# Patient Record
Sex: MALE | NOT HISPANIC OR LATINO | Employment: OTHER | URBAN - METROPOLITAN AREA
[De-identification: names, ages, dates, MRNs, and addresses within clinical notes are randomized per-mention and may not be internally consistent; named-entity substitution may affect disease eponyms.]

---

## 2017-01-09 ENCOUNTER — TELEPHONE (OUTPATIENT)
Dept: ONCOLOGY | Facility: HOSPITAL | Age: 27
End: 2017-01-09

## 2017-01-09 NOTE — TELEPHONE ENCOUNTER
----- Message from Rema Tammy sent at 1/9/2017  4:18 PM EST -----  Contact: [phone]   Pt calling has MRSA in hi leg should he continue his Spyrcel with that going on?      Patient is being treated with vancomycin for an MRSA infection in his leg.  His doc would like for him to stop the sprycel for a period of time to help with the healing process.  Reviewed with Isaura Hough, ok for patient to stop it.  Patient will know more tomorrow about how long he needs to be off and will update Dr. Riddle tomorrow.

## 2017-04-19 ENCOUNTER — TRANSCRIBE ORDERS (OUTPATIENT)
Dept: ADMINISTRATIVE | Facility: HOSPITAL | Age: 27
End: 2017-04-19

## 2017-04-19 ENCOUNTER — HOSPITAL ENCOUNTER (OUTPATIENT)
Dept: GENERAL RADIOLOGY | Facility: HOSPITAL | Age: 27
Discharge: HOME | End: 2017-04-19
Admitting: NURSE PRACTITIONER

## 2017-04-19 DIAGNOSIS — M47.26 OTHER SPONDYLOSIS WITH RADICULOPATHY, LUMBAR REGION: ICD-10-CM

## 2017-04-19 DIAGNOSIS — M47.26 OTHER SPONDYLOSIS WITH RADICULOPATHY, LUMBAR REGION: Primary | ICD-10-CM

## 2017-04-19 PROCEDURE — 72114 X-RAY EXAM L-S SPINE BENDING: CPT

## 2017-05-01 ENCOUNTER — OFFICE VISIT (OUTPATIENT)
Dept: RETAIL CLINIC | Facility: CLINIC | Age: 27
End: 2017-05-01

## 2017-05-01 DIAGNOSIS — Z02.83 ENCOUNTER FOR DRUG SCREENING: Primary | ICD-10-CM

## 2017-05-03 ENCOUNTER — DOCUMENTATION (OUTPATIENT)
Dept: ONCOLOGY | Facility: CLINIC | Age: 27
End: 2017-05-03

## 2017-05-23 ENCOUNTER — LAB (OUTPATIENT)
Dept: LAB | Facility: HOSPITAL | Age: 27
End: 2017-05-23

## 2017-05-23 DIAGNOSIS — C92.11 CHRONIC MYELOID LEUKEMIA, BCR/ABL-POSITIVE, IN REMISSION (HCC): ICD-10-CM

## 2017-05-23 LAB
ALBUMIN SERPL-MCNC: 4 G/DL (ref 3.5–5.2)
ALBUMIN/GLOB SERPL: 1.7 G/DL
ALP SERPL-CCNC: 62 U/L (ref 40–129)
ALT SERPL W P-5'-P-CCNC: 19 U/L (ref 5–41)
ANION GAP SERPL CALCULATED.3IONS-SCNC: 13.4 MMOL/L
AST SERPL-CCNC: 15 U/L (ref 5–40)
BASOPHILS # BLD AUTO: 0.05 10*3/MM3 (ref 0–0.2)
BASOPHILS NFR BLD AUTO: 0.4 % (ref 0–2)
BILIRUB SERPL-MCNC: 0.2 MG/DL (ref 0.2–1.2)
BUN BLD-MCNC: 13 MG/DL (ref 6–20)
BUN/CREAT SERPL: 16.9 (ref 7–25)
CALCIUM SPEC-SCNC: 8.4 MG/DL (ref 8.6–10.5)
CHLORIDE SERPL-SCNC: 105 MMOL/L (ref 98–107)
CO2 SERPL-SCNC: 21.6 MMOL/L (ref 22–29)
CREAT BLD-MCNC: 0.77 MG/DL (ref 0.76–1.27)
DEPRECATED RDW RBC AUTO: 45.6 FL (ref 37–54)
EOSINOPHIL # BLD AUTO: 0.3 10*3/MM3 (ref 0.1–0.3)
EOSINOPHIL NFR BLD AUTO: 2.5 % (ref 0–4)
ERYTHROCYTE [DISTWIDTH] IN BLOOD BY AUTOMATED COUNT: 12.8 % (ref 11.5–14.5)
GFR SERPL CREATININE-BSD FRML MDRD: 120 ML/MIN/1.73
GLOBULIN UR ELPH-MCNC: 2.3 GM/DL
GLUCOSE BLD-MCNC: 122 MG/DL (ref 65–99)
HCT VFR BLD AUTO: 40.9 % (ref 42–52)
HGB BLD-MCNC: 13.5 G/DL (ref 14–18)
IMM GRANULOCYTES # BLD: 0.03 10*3/MM3 (ref 0–0.03)
IMM GRANULOCYTES NFR BLD: 0.2 % (ref 0–0.5)
LYMPHOCYTES # BLD AUTO: 3.27 10*3/MM3 (ref 0.6–4.8)
LYMPHOCYTES NFR BLD AUTO: 26.9 % (ref 20–45)
MCH RBC QN AUTO: 31.8 PG (ref 27–31)
MCHC RBC AUTO-ENTMCNC: 33 G/DL (ref 31–37)
MCV RBC AUTO: 96.2 FL (ref 80–94)
MONOCYTES # BLD AUTO: 1.17 10*3/MM3 (ref 0–1)
MONOCYTES NFR BLD AUTO: 9.6 % (ref 3–8)
NEUTROPHILS # BLD AUTO: 7.34 10*3/MM3 (ref 1.5–8.3)
NEUTROPHILS NFR BLD AUTO: 60.4 % (ref 45–70)
NRBC BLD MANUAL-RTO: 0 /100 WBC (ref 0–0)
PLATELET # BLD AUTO: 184 10*3/MM3 (ref 140–500)
PMV BLD AUTO: 11.1 FL (ref 7.4–10.4)
POTASSIUM BLD-SCNC: 3.8 MMOL/L (ref 3.5–5.2)
PROT SERPL-MCNC: 6.3 G/DL (ref 6–8.5)
RBC # BLD AUTO: 4.25 10*6/MM3 (ref 4.7–6.1)
SODIUM BLD-SCNC: 140 MMOL/L (ref 136–145)
TSH SERPL DL<=0.05 MIU/L-ACNC: 1.51 MIU/ML (ref 0.27–4.2)
WBC NRBC COR # BLD: 12.16 10*3/MM3 (ref 4.8–10.8)

## 2017-05-23 PROCEDURE — 81206 BCR/ABL1 GENE MAJOR BP: CPT | Performed by: INTERNAL MEDICINE

## 2017-05-23 PROCEDURE — 80053 COMPREHEN METABOLIC PANEL: CPT | Performed by: INTERNAL MEDICINE

## 2017-05-23 PROCEDURE — 36415 COLL VENOUS BLD VENIPUNCTURE: CPT | Performed by: INTERNAL MEDICINE

## 2017-05-23 PROCEDURE — 81207 BCR/ABL1 GENE MINOR BP: CPT | Performed by: INTERNAL MEDICINE

## 2017-05-23 PROCEDURE — 85025 COMPLETE CBC W/AUTO DIFF WBC: CPT | Performed by: INTERNAL MEDICINE

## 2017-05-23 PROCEDURE — 84443 ASSAY THYROID STIM HORMONE: CPT | Performed by: INTERNAL MEDICINE

## 2017-05-31 LAB — REF LAB TEST METHOD: NORMAL

## 2017-06-13 ENCOUNTER — APPOINTMENT (OUTPATIENT)
Dept: ONCOLOGY | Facility: CLINIC | Age: 27
End: 2017-06-13

## 2017-06-13 ENCOUNTER — APPOINTMENT (OUTPATIENT)
Dept: LAB | Facility: HOSPITAL | Age: 27
End: 2017-06-13

## 2017-07-24 ENCOUNTER — OFFICE VISIT (OUTPATIENT)
Dept: RETAIL CLINIC | Facility: CLINIC | Age: 27
End: 2017-07-24

## 2017-07-24 DIAGNOSIS — Z02.83 ENCOUNTER FOR DRUG SCREENING: Primary | ICD-10-CM

## 2017-07-25 ENCOUNTER — APPOINTMENT (OUTPATIENT)
Dept: LAB | Facility: HOSPITAL | Age: 27
End: 2017-07-25

## 2017-07-25 ENCOUNTER — OFFICE VISIT (OUTPATIENT)
Dept: ONCOLOGY | Facility: CLINIC | Age: 27
End: 2017-07-25

## 2017-07-25 VITALS
HEART RATE: 76 BPM | RESPIRATION RATE: 16 BRPM | HEIGHT: 78 IN | WEIGHT: 315 LBS | TEMPERATURE: 97.7 F | OXYGEN SATURATION: 97 % | DIASTOLIC BLOOD PRESSURE: 78 MMHG | SYSTOLIC BLOOD PRESSURE: 149 MMHG | BODY MASS INDEX: 36.45 KG/M2

## 2017-07-25 DIAGNOSIS — C92.11 CHRONIC MYELOID LEUKEMIA, BCR/ABL-POSITIVE, IN REMISSION (HCC): Primary | ICD-10-CM

## 2017-07-25 LAB
BASOPHILS # BLD AUTO: 0.05 10*3/MM3 (ref 0–0.2)
BASOPHILS NFR BLD AUTO: 0.4 % (ref 0–2)
DEPRECATED RDW RBC AUTO: 45.1 FL (ref 37–54)
EOSINOPHIL # BLD AUTO: 0.32 10*3/MM3 (ref 0.1–0.3)
EOSINOPHIL NFR BLD AUTO: 2.7 % (ref 0–4)
ERYTHROCYTE [DISTWIDTH] IN BLOOD BY AUTOMATED COUNT: 12.8 % (ref 11.5–14.5)
HCT VFR BLD AUTO: 43.2 % (ref 42–52)
HGB BLD-MCNC: 14.7 G/DL (ref 14–18)
IMM GRANULOCYTES # BLD: 0.05 10*3/MM3 (ref 0–0.03)
IMM GRANULOCYTES NFR BLD: 0.4 % (ref 0–0.5)
LYMPHOCYTES # BLD AUTO: 3.6 10*3/MM3 (ref 0.6–4.8)
LYMPHOCYTES NFR BLD AUTO: 30.6 % (ref 20–45)
MCH RBC QN AUTO: 32.2 PG (ref 27–31)
MCHC RBC AUTO-ENTMCNC: 34 G/DL (ref 31–37)
MCV RBC AUTO: 94.7 FL (ref 80–94)
MONOCYTES # BLD AUTO: 1.1 10*3/MM3 (ref 0–1)
MONOCYTES NFR BLD AUTO: 9.3 % (ref 3–8)
NEUTROPHILS # BLD AUTO: 6.65 10*3/MM3 (ref 1.5–8.3)
NEUTROPHILS NFR BLD AUTO: 56.6 % (ref 45–70)
NRBC BLD MANUAL-RTO: 0.3 /100 WBC (ref 0–0)
PLATELET # BLD AUTO: 166 10*3/MM3 (ref 140–500)
PMV BLD AUTO: 11.6 FL (ref 7.4–10.4)
RBC # BLD AUTO: 4.56 10*6/MM3 (ref 4.7–6.1)
WBC NRBC COR # BLD: 11.77 10*3/MM3 (ref 4.8–10.8)

## 2017-07-25 PROCEDURE — 36415 COLL VENOUS BLD VENIPUNCTURE: CPT | Performed by: INTERNAL MEDICINE

## 2017-07-25 PROCEDURE — 85025 COMPLETE CBC W/AUTO DIFF WBC: CPT | Performed by: INTERNAL MEDICINE

## 2017-07-25 PROCEDURE — 99213 OFFICE O/P EST LOW 20 MIN: CPT | Performed by: INTERNAL MEDICINE

## 2017-07-25 RX ORDER — HYDROCODONE BITARTRATE AND ACETAMINOPHEN 5; 325 MG/1; MG/1
1 TABLET ORAL EVERY 6 HOURS PRN
COMMUNITY
End: 2019-01-31 | Stop reason: HOSPADM
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
## 2017-08-07 ENCOUNTER — DOCUMENTATION (OUTPATIENT)
Dept: ONCOLOGY | Facility: CLINIC | Age: 27
End: 2017-08-07

## 2017-08-07 NOTE — PROGRESS NOTES
Fax rec from [facility] requesting a new rx for pts Sprycel 100 mg. Per 7/25/17 office note from Dr Riddle-Pt will continue. New Rx escribed to [facility].

## 2017-08-26 PROCEDURE — 99284 EMERGENCY DEPT VISIT MOD MDM: CPT

## 2017-08-27 ENCOUNTER — HOSPITAL ENCOUNTER (EMERGENCY)
Facility: HOSPITAL | Age: 27
Discharge: HOME | End: 2017-08-27
Attending: EMERGENCY MEDICINE | Admitting: EMERGENCY MEDICINE

## 2017-08-27 VITALS
BODY MASS INDEX: 36.45 KG/M2 | RESPIRATION RATE: 16 BRPM | SYSTOLIC BLOOD PRESSURE: 153 MMHG | OXYGEN SATURATION: 98 % | TEMPERATURE: 99 F | WEIGHT: 315 LBS | DIASTOLIC BLOOD PRESSURE: 84 MMHG | HEIGHT: 78 IN | HEART RATE: 80 BPM

## 2017-08-27 DIAGNOSIS — R53.83 MALAISE AND FATIGUE: ICD-10-CM

## 2017-08-27 DIAGNOSIS — IMO0001 ELEVATED BLOOD PRESSURE: ICD-10-CM

## 2017-08-27 DIAGNOSIS — R42 VERTIGO: Primary | ICD-10-CM

## 2017-08-27 DIAGNOSIS — R53.81 MALAISE AND FATIGUE: ICD-10-CM

## 2017-08-27 LAB
ANION GAP SERPL CALCULATED.3IONS-SCNC: 12.8 MMOL/L
BACTERIA UR QL AUTO: ABNORMAL /HPF
BASOPHILS # BLD AUTO: 0.08 10*3/MM3 (ref 0–0.2)
BASOPHILS NFR BLD AUTO: 0.5 % (ref 0–2)
BILIRUB UR QL STRIP: NEGATIVE
BUN BLD-MCNC: 19 MG/DL (ref 6–20)
BUN/CREAT SERPL: 18.8 (ref 7–25)
CALCIUM SPEC-SCNC: 8.9 MG/DL (ref 8.6–10.5)
CHLORIDE SERPL-SCNC: 105 MMOL/L (ref 98–107)
CLARITY UR: CLEAR
CO2 SERPL-SCNC: 23.2 MMOL/L (ref 22–29)
COLOR UR: YELLOW
CREAT BLD-MCNC: 1.01 MG/DL (ref 0.76–1.27)
DEPRECATED RDW RBC AUTO: 47.2 FL (ref 37–54)
EOSINOPHIL # BLD AUTO: 0.61 10*3/MM3 (ref 0.1–0.3)
EOSINOPHIL NFR BLD AUTO: 4.1 % (ref 0–4)
ERYTHROCYTE [DISTWIDTH] IN BLOOD BY AUTOMATED COUNT: 13 % (ref 11.5–14.5)
GFR SERPL CREATININE-BSD FRML MDRD: 87 ML/MIN/1.73
GLUCOSE BLD-MCNC: 100 MG/DL (ref 65–99)
GLUCOSE UR STRIP-MCNC: NEGATIVE MG/DL
HCT VFR BLD AUTO: 42.3 % (ref 42–52)
HETEROPH AB SER QL LA: NEGATIVE
HGB BLD-MCNC: 13.9 G/DL (ref 14–18)
HGB UR QL STRIP.AUTO: ABNORMAL
HYALINE CASTS UR QL AUTO: ABNORMAL /LPF
IMM GRANULOCYTES # BLD: 0.04 10*3/MM3 (ref 0–0.03)
IMM GRANULOCYTES NFR BLD: 0.3 % (ref 0–0.5)
KETONES UR QL STRIP: ABNORMAL
LEUKOCYTE ESTERASE UR QL STRIP.AUTO: NEGATIVE
LYMPHOCYTES # BLD AUTO: 4.44 10*3/MM3 (ref 0.6–4.8)
LYMPHOCYTES NFR BLD AUTO: 30.1 % (ref 20–45)
MCH RBC QN AUTO: 32.4 PG (ref 27–31)
MCHC RBC AUTO-ENTMCNC: 32.9 G/DL (ref 31–37)
MCV RBC AUTO: 98.6 FL (ref 80–94)
MONOCYTES # BLD AUTO: 1.78 10*3/MM3 (ref 0–1)
MONOCYTES NFR BLD AUTO: 12.1 % (ref 3–8)
MUCOUS THREADS URNS QL MICRO: ABNORMAL /HPF
NEUTROPHILS # BLD AUTO: 7.79 10*3/MM3 (ref 1.5–8.3)
NEUTROPHILS NFR BLD AUTO: 52.9 % (ref 45–70)
NITRITE UR QL STRIP: NEGATIVE
NRBC BLD MANUAL-RTO: 0 /100 WBC (ref 0–0)
PH UR STRIP.AUTO: 6 [PH] (ref 4.5–8)
PLATELET # BLD AUTO: 158 10*3/MM3 (ref 140–500)
PMV BLD AUTO: 11.9 FL (ref 7.4–10.4)
POTASSIUM BLD-SCNC: 3.8 MMOL/L (ref 3.5–5.2)
PROT UR QL STRIP: ABNORMAL
RBC # BLD AUTO: 4.29 10*6/MM3 (ref 4.7–6.1)
RBC # UR: ABNORMAL /HPF
REF LAB TEST METHOD: ABNORMAL
SODIUM BLD-SCNC: 141 MMOL/L (ref 136–145)
SP GR UR STRIP: 1.02 (ref 1–1.03)
SQUAMOUS #/AREA URNS HPF: ABNORMAL /HPF
UROBILINOGEN UR QL STRIP: ABNORMAL
WBC NRBC COR # BLD: 14.74 10*3/MM3 (ref 4.8–10.8)
WBC UR QL AUTO: ABNORMAL /HPF

## 2017-08-27 PROCEDURE — 25010000002 METHYLPREDNISOLONE PER 40 MG: Performed by: EMERGENCY MEDICINE

## 2017-08-27 PROCEDURE — 86308 HETEROPHILE ANTIBODY SCREEN: CPT | Performed by: EMERGENCY MEDICINE

## 2017-08-27 PROCEDURE — 96375 TX/PRO/DX INJ NEW DRUG ADDON: CPT

## 2017-08-27 PROCEDURE — 96374 THER/PROPH/DIAG INJ IV PUSH: CPT

## 2017-08-27 PROCEDURE — 85025 COMPLETE CBC W/AUTO DIFF WBC: CPT | Performed by: EMERGENCY MEDICINE

## 2017-08-27 PROCEDURE — 99282 EMERGENCY DEPT VISIT SF MDM: CPT | Performed by: EMERGENCY MEDICINE

## 2017-08-27 PROCEDURE — 80048 BASIC METABOLIC PNL TOTAL CA: CPT | Performed by: EMERGENCY MEDICINE

## 2017-08-27 PROCEDURE — 25010000002 LORAZEPAM PER 2 MG: Performed by: EMERGENCY MEDICINE

## 2017-08-27 PROCEDURE — 81001 URINALYSIS AUTO W/SCOPE: CPT | Performed by: EMERGENCY MEDICINE

## 2017-08-27 PROCEDURE — 96361 HYDRATE IV INFUSION ADD-ON: CPT

## 2017-08-27 RX ORDER — SODIUM CHLORIDE 0.9 % (FLUSH) 0.9 %
10 SYRINGE (ML) INJECTION AS NEEDED
Status: DISCONTINUED | OUTPATIENT
Start: 2017-08-27 | End: 2017-08-27 | Stop reason: HOSPADM

## 2017-08-27 RX ORDER — METHYLPREDNISOLONE SODIUM SUCCINATE 40 MG/ML
80 INJECTION, POWDER, LYOPHILIZED, FOR SOLUTION INTRAMUSCULAR; INTRAVENOUS ONCE
Status: COMPLETED | OUTPATIENT
Start: 2017-08-27 | End: 2017-08-27

## 2017-08-27 RX ORDER — MECLIZINE HYDROCHLORIDE 25 MG/1
25 TABLET ORAL ONCE
Status: COMPLETED | OUTPATIENT
Start: 2017-08-27 | End: 2017-08-27

## 2017-08-27 RX ORDER — LORAZEPAM 2 MG/ML
0.5 INJECTION INTRAMUSCULAR ONCE
Status: COMPLETED | OUTPATIENT
Start: 2017-08-27 | End: 2017-08-27

## 2017-08-27 RX ORDER — BUPROPION HYDROCHLORIDE 150 MG/1
150 TABLET ORAL DAILY
COMMUNITY
End: 2018-08-26

## 2017-08-27 RX ORDER — MECLIZINE HYDROCHLORIDE 25 MG/1
TABLET ORAL
Qty: 30 TABLET | Refills: 0 | Status: SHIPPED | OUTPATIENT
Start: 2017-08-27 | End: 2017-11-14

## 2017-08-27 RX ADMIN — LORAZEPAM 0.5 MG: 2 INJECTION INTRAMUSCULAR; INTRAVENOUS at 01:08

## 2017-08-27 RX ADMIN — METHYLPREDNISOLONE SODIUM SUCCINATE 80 MG: 40 INJECTION, POWDER, FOR SOLUTION INTRAMUSCULAR; INTRAVENOUS at 01:05

## 2017-08-27 RX ADMIN — SODIUM CHLORIDE 1000 ML: 9 INJECTION, SOLUTION INTRAVENOUS at 01:16

## 2017-08-27 RX ADMIN — MECLIZINE HYDROCHLORIDE 25 MG: 25 TABLET ORAL at 01:07

## 2017-09-24 ENCOUNTER — HOSPITAL ENCOUNTER (EMERGENCY)
Facility: HOSPITAL | Age: 27
Discharge: HOME | End: 2017-09-24
Admitting: PHYSICIAN ASSISTANT

## 2017-09-24 VITALS
DIASTOLIC BLOOD PRESSURE: 81 MMHG | RESPIRATION RATE: 18 BRPM | HEART RATE: 80 BPM | HEIGHT: 74 IN | TEMPERATURE: 98.3 F | SYSTOLIC BLOOD PRESSURE: 145 MMHG | BODY MASS INDEX: 40.43 KG/M2 | WEIGHT: 315 LBS | OXYGEN SATURATION: 96 %

## 2017-09-24 DIAGNOSIS — T14.8XXA INFECTED WOUND: ICD-10-CM

## 2017-09-24 DIAGNOSIS — L08.9 INFECTED WOUND: ICD-10-CM

## 2017-09-24 DIAGNOSIS — L03.115 CELLULITIS OF RIGHT LOWER EXTREMITY: Primary | ICD-10-CM

## 2017-09-24 LAB
ALBUMIN SERPL-MCNC: 4 G/DL (ref 3.5–5.2)
ALBUMIN/GLOB SERPL: 1.6 G/DL
ALP SERPL-CCNC: 75 U/L (ref 40–129)
ALT SERPL W P-5'-P-CCNC: 19 U/L (ref 5–41)
ANION GAP SERPL CALCULATED.3IONS-SCNC: 14 MMOL/L
AST SERPL-CCNC: 18 U/L (ref 5–40)
BASOPHILS # BLD AUTO: 0.03 10*3/MM3 (ref 0–0.2)
BASOPHILS NFR BLD AUTO: 0.3 % (ref 0–2)
BILIRUB SERPL-MCNC: 0.2 MG/DL (ref 0.2–1.2)
BUN BLD-MCNC: 14 MG/DL (ref 6–20)
BUN/CREAT SERPL: 14.6 (ref 7–25)
CALCIUM SPEC-SCNC: 8.6 MG/DL (ref 8.6–10.5)
CHLORIDE SERPL-SCNC: 102 MMOL/L (ref 98–107)
CO2 SERPL-SCNC: 24 MMOL/L (ref 22–29)
CREAT BLD-MCNC: 0.96 MG/DL (ref 0.76–1.27)
D-LACTATE SERPL-SCNC: 1.5 MMOL/L (ref 0.5–2)
DEPRECATED RDW RBC AUTO: 47.1 FL (ref 37–54)
EOSINOPHIL # BLD AUTO: 0.4 10*3/MM3 (ref 0.1–0.3)
EOSINOPHIL NFR BLD AUTO: 4 % (ref 0–4)
ERYTHROCYTE [DISTWIDTH] IN BLOOD BY AUTOMATED COUNT: 13.1 % (ref 11.5–14.5)
ERYTHROCYTE [SEDIMENTATION RATE] IN BLOOD: 9 MM/HR (ref 0–20)
GFR SERPL CREATININE-BSD FRML MDRD: 93 ML/MIN/1.73
GLOBULIN UR ELPH-MCNC: 2.5 GM/DL
GLUCOSE BLD-MCNC: 93 MG/DL (ref 65–99)
HCT VFR BLD AUTO: 41.3 % (ref 42–52)
HGB BLD-MCNC: 13.7 G/DL (ref 14–18)
IMM GRANULOCYTES # BLD: 0.02 10*3/MM3 (ref 0–0.03)
IMM GRANULOCYTES NFR BLD: 0.2 % (ref 0–0.5)
LYMPHOCYTES # BLD AUTO: 2.41 10*3/MM3 (ref 0.6–4.8)
LYMPHOCYTES NFR BLD AUTO: 23.9 % (ref 20–45)
MCH RBC QN AUTO: 32.9 PG (ref 27–31)
MCHC RBC AUTO-ENTMCNC: 33.2 G/DL (ref 31–37)
MCV RBC AUTO: 99.3 FL (ref 80–94)
MONOCYTES # BLD AUTO: 1.09 10*3/MM3 (ref 0–1)
MONOCYTES NFR BLD AUTO: 10.8 % (ref 3–8)
NEUTROPHILS # BLD AUTO: 6.12 10*3/MM3 (ref 1.5–8.3)
NEUTROPHILS NFR BLD AUTO: 60.8 % (ref 45–70)
NRBC BLD MANUAL-RTO: 0 /100 WBC (ref 0–0)
PLATELET # BLD AUTO: 201 10*3/MM3 (ref 140–500)
PMV BLD AUTO: 11.6 FL (ref 7.4–10.4)
POTASSIUM BLD-SCNC: 3.8 MMOL/L (ref 3.5–5.2)
PROT SERPL-MCNC: 6.5 G/DL (ref 6–8.5)
RBC # BLD AUTO: 4.16 10*6/MM3 (ref 4.7–6.1)
SODIUM BLD-SCNC: 140 MMOL/L (ref 136–145)
WBC NRBC COR # BLD: 10.07 10*3/MM3 (ref 4.8–10.8)

## 2017-09-24 PROCEDURE — 96365 THER/PROPH/DIAG IV INF INIT: CPT

## 2017-09-24 PROCEDURE — 83605 ASSAY OF LACTIC ACID: CPT | Performed by: PHYSICIAN ASSISTANT

## 2017-09-24 PROCEDURE — 87147 CULTURE TYPE IMMUNOLOGIC: CPT | Performed by: PHYSICIAN ASSISTANT

## 2017-09-24 PROCEDURE — 80053 COMPREHEN METABOLIC PANEL: CPT | Performed by: PHYSICIAN ASSISTANT

## 2017-09-24 PROCEDURE — 87186 SC STD MICRODIL/AGAR DIL: CPT | Performed by: PHYSICIAN ASSISTANT

## 2017-09-24 PROCEDURE — 99282 EMERGENCY DEPT VISIT SF MDM: CPT | Performed by: PHYSICIAN ASSISTANT

## 2017-09-24 PROCEDURE — 85025 COMPLETE CBC W/AUTO DIFF WBC: CPT | Performed by: PHYSICIAN ASSISTANT

## 2017-09-24 PROCEDURE — 25010000002 VANCOMYCIN PER 500 MG: Performed by: PHYSICIAN ASSISTANT

## 2017-09-24 PROCEDURE — 99283 EMERGENCY DEPT VISIT LOW MDM: CPT

## 2017-09-24 PROCEDURE — 87070 CULTURE OTHR SPECIMN AEROBIC: CPT | Performed by: PHYSICIAN ASSISTANT

## 2017-09-24 PROCEDURE — 87205 SMEAR GRAM STAIN: CPT | Performed by: PHYSICIAN ASSISTANT

## 2017-09-24 PROCEDURE — 85652 RBC SED RATE AUTOMATED: CPT | Performed by: PHYSICIAN ASSISTANT

## 2017-09-24 PROCEDURE — 87040 BLOOD CULTURE FOR BACTERIA: CPT | Performed by: PHYSICIAN ASSISTANT

## 2017-09-24 PROCEDURE — 96366 THER/PROPH/DIAG IV INF ADDON: CPT

## 2017-09-24 RX ORDER — SODIUM CHLORIDE 9 MG/ML
INJECTION, SOLUTION INTRAVENOUS
Status: DISCONTINUED
Start: 2017-09-24 | End: 2017-09-24 | Stop reason: HOSPADM

## 2017-09-24 RX ORDER — CEPHALEXIN 500 MG/1
500 CAPSULE ORAL 4 TIMES DAILY
Qty: 40 CAPSULE | Refills: 0 | Status: SHIPPED | OUTPATIENT
Start: 2017-09-24 | End: 2017-10-04

## 2017-09-24 RX ORDER — CLINDAMYCIN HYDROCHLORIDE 300 MG/1
300 CAPSULE ORAL EVERY 6 HOURS
Qty: 40 CAPSULE | Refills: 0 | Status: SHIPPED | OUTPATIENT
Start: 2017-09-24 | End: 2017-10-04

## 2017-09-24 RX ORDER — SODIUM CHLORIDE 0.9 % (FLUSH) 0.9 %
10 SYRINGE (ML) INJECTION AS NEEDED
Status: DISCONTINUED | OUTPATIENT
Start: 2017-09-24 | End: 2017-09-24 | Stop reason: HOSPADM

## 2017-09-24 RX ORDER — SODIUM HYPOCHLORITE 1.25 MG/ML
SOLUTION TOPICAL ONCE
Status: COMPLETED | OUTPATIENT
Start: 2017-09-24 | End: 2017-09-24

## 2017-09-24 RX ADMIN — VANCOMYCIN HYDROCHLORIDE 3000 MG: 1 INJECTION, POWDER, LYOPHILIZED, FOR SOLUTION INTRAVENOUS at 19:00

## 2017-09-24 RX ADMIN — SODIUM HYPOCHLORITE: 1.25 SOLUTION TOPICAL at 20:24

## 2017-09-24 NOTE — ED NOTES
2nd set of Blood cultures drawn by me(Zia AVILA) at 1840 from left [facility]  09/24/17 1909
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
## 2017-09-25 NOTE — ED NOTES
Called the house supervisor to bring the Dakins solution to the ER.       Mae Recinos RN  09/24/17 2005

## 2017-09-25 NOTE — ED NOTES
The patient verbalized understanding of discharge instructions, medications and follow up.  Ambulated from the ER with a steady gait.  No further needs at this time.       Mae Recinos RN  09/24/17 3662

## 2017-09-25 NOTE — ED NOTES
"Chief Complaint   Patient presents with   • Joint Swelling     Blood pressure 156/82, pulse 85, temperature 98.3 °F (36.8 °C), resp. rate 18, height 74\" (188 cm), weight (!) 317 lb 7.4 oz (144 kg), SpO2 99 %.    The patient presents to room 5 complaining of an open wound to his left shin area.  There is redness and swelling to his left lower leg and ankle.  States the wound has been there for over a year and it is getting worse.       Mae Recinos RN  09/24/17 2016    "

## 2017-09-27 LAB
BACTERIA SPEC AEROBE CULT: ABNORMAL
GRAM STN SPEC: ABNORMAL
GRAM STN SPEC: ABNORMAL

## 2017-09-29 LAB
BACTERIA SPEC AEROBE CULT: NORMAL
BACTERIA SPEC AEROBE CULT: NORMAL

## 2017-11-07 ENCOUNTER — LAB (OUTPATIENT)
Dept: LAB | Facility: HOSPITAL | Age: 27
End: 2017-11-07

## 2017-11-07 DIAGNOSIS — C92.11 CHRONIC MYELOID LEUKEMIA, BCR/ABL-POSITIVE, IN REMISSION (HCC): ICD-10-CM

## 2017-11-07 LAB
ALBUMIN SERPL-MCNC: 4.2 G/DL (ref 3.5–5.2)
ALBUMIN/GLOB SERPL: 1.5 G/DL
ALP SERPL-CCNC: 74 U/L (ref 40–129)
ALT SERPL W P-5'-P-CCNC: 28 U/L (ref 5–41)
ANION GAP SERPL CALCULATED.3IONS-SCNC: 13.4 MMOL/L
AST SERPL-CCNC: 24 U/L (ref 5–40)
BASOPHILS # BLD AUTO: 0.04 10*3/MM3 (ref 0–0.2)
BASOPHILS NFR BLD AUTO: 0.4 % (ref 0–2)
BILIRUB SERPL-MCNC: 0.4 MG/DL (ref 0.2–1.2)
BUN BLD-MCNC: 13 MG/DL (ref 6–20)
BUN/CREAT SERPL: 15.7 (ref 7–25)
CALCIUM SPEC-SCNC: 8.8 MG/DL (ref 8.6–10.5)
CHLORIDE SERPL-SCNC: 107 MMOL/L (ref 98–107)
CO2 SERPL-SCNC: 20.6 MMOL/L (ref 22–29)
CREAT BLD-MCNC: 0.83 MG/DL (ref 0.76–1.27)
DEPRECATED RDW RBC AUTO: 48.2 FL (ref 37–54)
EOSINOPHIL # BLD AUTO: 0.35 10*3/MM3 (ref 0.1–0.3)
EOSINOPHIL NFR BLD AUTO: 3.4 % (ref 0–4)
ERYTHROCYTE [DISTWIDTH] IN BLOOD BY AUTOMATED COUNT: 13.2 % (ref 11.5–14.5)
GFR SERPL CREATININE-BSD FRML MDRD: 110 ML/MIN/1.73
GLOBULIN UR ELPH-MCNC: 2.8 GM/DL
GLUCOSE BLD-MCNC: 106 MG/DL (ref 65–99)
HCT VFR BLD AUTO: 43.2 % (ref 42–52)
HGB BLD-MCNC: 14.3 G/DL (ref 14–18)
IMM GRANULOCYTES # BLD: 0.03 10*3/MM3 (ref 0–0.03)
IMM GRANULOCYTES NFR BLD: 0.3 % (ref 0–0.5)
LYMPHOCYTES # BLD AUTO: 3.39 10*3/MM3 (ref 0.6–4.8)
LYMPHOCYTES NFR BLD AUTO: 33 % (ref 20–45)
MCH RBC QN AUTO: 32.6 PG (ref 27–31)
MCHC RBC AUTO-ENTMCNC: 33.1 G/DL (ref 31–37)
MCV RBC AUTO: 98.6 FL (ref 80–94)
MONOCYTES # BLD AUTO: 0.96 10*3/MM3 (ref 0–1)
MONOCYTES NFR BLD AUTO: 9.3 % (ref 3–8)
NEUTROPHILS # BLD AUTO: 5.51 10*3/MM3 (ref 1.5–8.3)
NEUTROPHILS NFR BLD AUTO: 53.6 % (ref 45–70)
NRBC BLD MANUAL-RTO: 0 /100 WBC (ref 0–0)
PLATELET # BLD AUTO: 175 10*3/MM3 (ref 140–500)
PMV BLD AUTO: 11.2 FL (ref 7.4–10.4)
POTASSIUM BLD-SCNC: 4 MMOL/L (ref 3.5–5.2)
PROT SERPL-MCNC: 7 G/DL (ref 6–8.5)
RBC # BLD AUTO: 4.38 10*6/MM3 (ref 4.7–6.1)
SODIUM BLD-SCNC: 141 MMOL/L (ref 136–145)
WBC NRBC COR # BLD: 10.28 10*3/MM3 (ref 4.8–10.8)

## 2017-11-07 PROCEDURE — 81207 BCR/ABL1 GENE MINOR BP: CPT | Performed by: INTERNAL MEDICINE

## 2017-11-07 PROCEDURE — 36415 COLL VENOUS BLD VENIPUNCTURE: CPT | Performed by: INTERNAL MEDICINE

## 2017-11-07 PROCEDURE — 81479 UNLISTED MOLECULAR PATHOLOGY: CPT

## 2017-11-07 PROCEDURE — 80053 COMPREHEN METABOLIC PANEL: CPT | Performed by: INTERNAL MEDICINE

## 2017-11-07 PROCEDURE — 85025 COMPLETE CBC W/AUTO DIFF WBC: CPT | Performed by: INTERNAL MEDICINE

## 2017-11-07 PROCEDURE — 81206 BCR/ABL1 GENE MAJOR BP: CPT | Performed by: INTERNAL MEDICINE

## 2017-11-13 LAB — REF LAB TEST METHOD: NORMAL

## 2017-11-14 ENCOUNTER — OFFICE VISIT (OUTPATIENT)
Dept: ONCOLOGY | Facility: CLINIC | Age: 27
End: 2017-11-14

## 2017-11-14 ENCOUNTER — APPOINTMENT (OUTPATIENT)
Dept: LAB | Facility: HOSPITAL | Age: 27
End: 2017-11-14

## 2017-11-14 VITALS
HEART RATE: 83 BPM | WEIGHT: 315 LBS | OXYGEN SATURATION: 97 % | SYSTOLIC BLOOD PRESSURE: 131 MMHG | HEIGHT: 78 IN | DIASTOLIC BLOOD PRESSURE: 76 MMHG | RESPIRATION RATE: 16 BRPM | BODY MASS INDEX: 36.45 KG/M2 | TEMPERATURE: 98 F

## 2017-11-14 DIAGNOSIS — Z51.81 THERAPEUTIC DRUG MONITORING: ICD-10-CM

## 2017-11-14 DIAGNOSIS — C92.11 CHRONIC MYELOID LEUKEMIA, BCR/ABL-POSITIVE, IN REMISSION (HCC): Primary | ICD-10-CM

## 2017-11-14 PROCEDURE — 99213 OFFICE O/P EST LOW 20 MIN: CPT | Performed by: INTERNAL MEDICINE

## 2017-11-14 PROCEDURE — G0463 HOSPITAL OUTPT CLINIC VISIT: HCPCS | Performed by: INTERNAL MEDICINE
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
## 2018-02-06 ENCOUNTER — LAB (OUTPATIENT)
Dept: LAB | Facility: HOSPITAL | Age: 28
End: 2018-02-06

## 2018-02-06 DIAGNOSIS — C92.11 CHRONIC MYELOID LEUKEMIA, BCR/ABL-POSITIVE, IN REMISSION (HCC): ICD-10-CM

## 2018-02-06 LAB
ALBUMIN SERPL-MCNC: 4.5 G/DL (ref 3.5–5.2)
ALBUMIN/GLOB SERPL: 1.9 G/DL
ALP SERPL-CCNC: 72 U/L (ref 40–129)
ALT SERPL W P-5'-P-CCNC: 21 U/L (ref 5–41)
ANION GAP SERPL CALCULATED.3IONS-SCNC: 10.2 MMOL/L
AST SERPL-CCNC: 18 U/L (ref 5–40)
BASOPHILS # BLD AUTO: 0.06 10*3/MM3 (ref 0–0.2)
BASOPHILS NFR BLD AUTO: 0.7 % (ref 0–2)
BILIRUB SERPL-MCNC: 0.2 MG/DL (ref 0.2–1.2)
BUN BLD-MCNC: 11 MG/DL (ref 6–20)
BUN/CREAT SERPL: 12.2 (ref 7–25)
CALCIUM SPEC-SCNC: 8.8 MG/DL (ref 8.6–10.5)
CHLORIDE SERPL-SCNC: 103 MMOL/L (ref 98–107)
CO2 SERPL-SCNC: 25.8 MMOL/L (ref 22–29)
CREAT BLD-MCNC: 0.9 MG/DL (ref 0.76–1.27)
DEPRECATED RDW RBC AUTO: 46.8 FL (ref 37–54)
EOSINOPHIL # BLD AUTO: 0.33 10*3/MM3 (ref 0.1–0.3)
EOSINOPHIL NFR BLD AUTO: 4.1 % (ref 0–4)
ERYTHROCYTE [DISTWIDTH] IN BLOOD BY AUTOMATED COUNT: 12.9 % (ref 11.5–14.5)
GFR SERPL CREATININE-BSD FRML MDRD: 100 ML/MIN/1.73
GLOBULIN UR ELPH-MCNC: 2.4 GM/DL
GLUCOSE BLD-MCNC: 93 MG/DL (ref 65–99)
HCT VFR BLD AUTO: 45 % (ref 42–52)
HGB BLD-MCNC: 14.7 G/DL (ref 14–18)
IMM GRANULOCYTES # BLD: 0.05 10*3/MM3 (ref 0–0.03)
IMM GRANULOCYTES NFR BLD: 0.6 % (ref 0–0.5)
LYMPHOCYTES # BLD AUTO: 1.68 10*3/MM3 (ref 0.6–4.8)
LYMPHOCYTES NFR BLD AUTO: 20.7 % (ref 20–45)
MCH RBC QN AUTO: 32.3 PG (ref 27–31)
MCHC RBC AUTO-ENTMCNC: 32.7 G/DL (ref 31–37)
MCV RBC AUTO: 98.9 FL (ref 80–94)
MONOCYTES # BLD AUTO: 0.86 10*3/MM3 (ref 0–1)
MONOCYTES NFR BLD AUTO: 10.6 % (ref 3–8)
NEUTROPHILS # BLD AUTO: 5.12 10*3/MM3 (ref 1.5–8.3)
NEUTROPHILS NFR BLD AUTO: 63.3 % (ref 45–70)
NRBC BLD MANUAL-RTO: 0 /100 WBC (ref 0–0)
PLATELET # BLD AUTO: 209 10*3/MM3 (ref 140–500)
PMV BLD AUTO: 11.2 FL (ref 7.4–10.4)
POTASSIUM BLD-SCNC: 4.3 MMOL/L (ref 3.5–5.2)
PROT SERPL-MCNC: 6.9 G/DL (ref 6–8.5)
RBC # BLD AUTO: 4.55 10*6/MM3 (ref 4.7–6.1)
SODIUM BLD-SCNC: 139 MMOL/L (ref 136–145)
WBC NRBC COR # BLD: 8.1 10*3/MM3 (ref 4.8–10.8)

## 2018-02-06 PROCEDURE — 80053 COMPREHEN METABOLIC PANEL: CPT | Performed by: INTERNAL MEDICINE

## 2018-02-06 PROCEDURE — 81206 BCR/ABL1 GENE MAJOR BP: CPT | Performed by: INTERNAL MEDICINE

## 2018-02-06 PROCEDURE — 81207 BCR/ABL1 GENE MINOR BP: CPT | Performed by: INTERNAL MEDICINE

## 2018-02-06 PROCEDURE — 36415 COLL VENOUS BLD VENIPUNCTURE: CPT | Performed by: INTERNAL MEDICINE

## 2018-02-06 PROCEDURE — 85025 COMPLETE CBC W/AUTO DIFF WBC: CPT | Performed by: INTERNAL MEDICINE

## 2018-02-15 LAB — REF LAB TEST METHOD: NORMAL

## 2018-02-20 ENCOUNTER — APPOINTMENT (OUTPATIENT)
Dept: LAB | Facility: HOSPITAL | Age: 28
End: 2018-02-20

## 2018-02-20 ENCOUNTER — APPOINTMENT (OUTPATIENT)
Dept: ONCOLOGY | Facility: CLINIC | Age: 28
End: 2018-02-20

## 2018-02-21 ENCOUNTER — DOCUMENTATION (OUTPATIENT)
Dept: ONCOLOGY | Facility: CLINIC | Age: 28
End: 2018-02-21

## 2018-02-21 NOTE — PROGRESS NOTES
Fax rec from [facility] requesting refills of pts Sprycel 100 mg. I have rec authorization from Dr Riddle to refill. See below.    MD Ebonie Dugan                     Yes, okay to refill. Thanks            Previous Messages       ----- Message -----      From: Ebonie Garcia      Sent: 2/21/2018  11:19 AM        To: Estrella Riddle MD   Subject: Sprycel Refill                                   I rec a refill request for Chaitanya's Sprycel 100 mg daily. Is this ok to refill?     Thank you,   Ebonie          I have escribed the rx to [facility]  Phone: [phone]

## 2018-03-06 ENCOUNTER — APPOINTMENT (OUTPATIENT)
Dept: GENERAL RADIOLOGY | Facility: HOSPITAL | Age: 28
End: 2018-03-06

## 2018-03-06 ENCOUNTER — APPOINTMENT (OUTPATIENT)
Dept: LAB | Facility: HOSPITAL | Age: 28
End: 2018-03-06

## 2018-03-06 ENCOUNTER — HOSPITAL ENCOUNTER (EMERGENCY)
Facility: HOSPITAL | Age: 28
Discharge: HOME | End: 2018-03-06
Attending: EMERGENCY MEDICINE | Admitting: EMERGENCY MEDICINE

## 2018-03-06 ENCOUNTER — APPOINTMENT (OUTPATIENT)
Dept: ONCOLOGY | Facility: CLINIC | Age: 28
End: 2018-03-06

## 2018-03-06 ENCOUNTER — TELEPHONE (OUTPATIENT)
Dept: ONCOLOGY | Facility: HOSPITAL | Age: 28
End: 2018-03-06

## 2018-03-06 VITALS
HEART RATE: 107 BPM | DIASTOLIC BLOOD PRESSURE: 51 MMHG | WEIGHT: 315 LBS | HEIGHT: 78 IN | OXYGEN SATURATION: 95 % | RESPIRATION RATE: 16 BRPM | SYSTOLIC BLOOD PRESSURE: 106 MMHG | BODY MASS INDEX: 36.45 KG/M2 | TEMPERATURE: 101.3 F

## 2018-03-06 DIAGNOSIS — D72.829 LEUKOCYTOSIS, UNSPECIFIED TYPE: ICD-10-CM

## 2018-03-06 DIAGNOSIS — C92.10 CML (CHRONIC MYELOCYTIC LEUKEMIA) (HCC): ICD-10-CM

## 2018-03-06 DIAGNOSIS — R50.9 FEVER AND CHILLS: ICD-10-CM

## 2018-03-06 DIAGNOSIS — J20.9 ACUTE BRONCHITIS, UNSPECIFIED ORGANISM: ICD-10-CM

## 2018-03-06 DIAGNOSIS — R68.89 FLU-LIKE SYMPTOMS: ICD-10-CM

## 2018-03-06 DIAGNOSIS — B34.9 ACUTE VIRAL SYNDROME: Primary | ICD-10-CM

## 2018-03-06 LAB
ALBUMIN SERPL-MCNC: 4.3 G/DL (ref 3.5–5.2)
ALBUMIN/GLOB SERPL: 1.7 G/DL
ALP SERPL-CCNC: 73 U/L (ref 40–129)
ALT SERPL W P-5'-P-CCNC: 21 U/L (ref 5–41)
ANION GAP SERPL CALCULATED.3IONS-SCNC: 12 MMOL/L
AST SERPL-CCNC: 17 U/L (ref 5–40)
BACTERIA UR QL AUTO: ABNORMAL /HPF
BASOPHILS # BLD AUTO: 0.02 10*3/MM3 (ref 0–0.2)
BASOPHILS NFR BLD AUTO: 0.1 % (ref 0–2)
BILIRUB SERPL-MCNC: 0.5 MG/DL (ref 0.2–1.2)
BILIRUB UR QL STRIP: NEGATIVE
BUN BLD-MCNC: 11 MG/DL (ref 6–20)
BUN/CREAT SERPL: 10.2 (ref 7–25)
CALCIUM SPEC-SCNC: 8.8 MG/DL (ref 8.6–10.5)
CHLORIDE SERPL-SCNC: 103 MMOL/L (ref 98–107)
CLARITY UR: CLEAR
CO2 SERPL-SCNC: 24 MMOL/L (ref 22–29)
COLOR UR: ABNORMAL
CREAT BLD-MCNC: 1.08 MG/DL (ref 0.76–1.27)
D-LACTATE SERPL-SCNC: 1.1 MMOL/L (ref 0.5–2)
DEPRECATED RDW RBC AUTO: 45.7 FL (ref 37–54)
EOSINOPHIL # BLD AUTO: 0.05 10*3/MM3 (ref 0.1–0.3)
EOSINOPHIL NFR BLD AUTO: 0.3 % (ref 0–4)
ERYTHROCYTE [DISTWIDTH] IN BLOOD BY AUTOMATED COUNT: 12.7 % (ref 11.5–14.5)
FLUAV AG NPH QL: NEGATIVE
FLUBV AG NPH QL IA: NEGATIVE
GFR SERPL CREATININE-BSD FRML MDRD: 81 ML/MIN/1.73
GLOBULIN UR ELPH-MCNC: 2.6 GM/DL
GLUCOSE BLD-MCNC: 101 MG/DL (ref 65–99)
GLUCOSE UR STRIP-MCNC: NEGATIVE MG/DL
HCT VFR BLD AUTO: 42.1 % (ref 42–52)
HGB BLD-MCNC: 14.1 G/DL (ref 14–18)
HGB UR QL STRIP.AUTO: ABNORMAL
HOLD SPECIMEN: NORMAL
HOLD SPECIMEN: NORMAL
HYALINE CASTS UR QL AUTO: ABNORMAL /LPF
IMM GRANULOCYTES # BLD: 0.06 10*3/MM3 (ref 0–0.03)
IMM GRANULOCYTES NFR BLD: 0.4 % (ref 0–0.5)
KETONES UR QL STRIP: ABNORMAL
LEUKOCYTE ESTERASE UR QL STRIP.AUTO: NEGATIVE
LYMPHOCYTES # BLD AUTO: 0.84 10*3/MM3 (ref 0.6–4.8)
LYMPHOCYTES NFR BLD AUTO: 5.4 % (ref 20–45)
MCH RBC QN AUTO: 32.8 PG (ref 27–31)
MCHC RBC AUTO-ENTMCNC: 33.5 G/DL (ref 31–37)
MCV RBC AUTO: 97.9 FL (ref 80–94)
MONOCYTES # BLD AUTO: 1.26 10*3/MM3 (ref 0–1)
MONOCYTES NFR BLD AUTO: 8.2 % (ref 3–8)
NEUTROPHILS # BLD AUTO: 13.23 10*3/MM3 (ref 1.5–8.3)
NEUTROPHILS NFR BLD AUTO: 85.6 % (ref 45–70)
NITRITE UR QL STRIP: NEGATIVE
NRBC BLD MANUAL-RTO: 0 /100 WBC (ref 0–0)
PH UR STRIP.AUTO: 7 [PH] (ref 4.5–8)
PLATELET # BLD AUTO: 176 10*3/MM3 (ref 140–500)
PMV BLD AUTO: 11.2 FL (ref 7.4–10.4)
POTASSIUM BLD-SCNC: 3.8 MMOL/L (ref 3.5–5.2)
PROT SERPL-MCNC: 6.9 G/DL (ref 6–8.5)
PROT UR QL STRIP: ABNORMAL
RBC # BLD AUTO: 4.3 10*6/MM3 (ref 4.7–6.1)
RBC # UR: ABNORMAL /HPF
REF LAB TEST METHOD: ABNORMAL
SODIUM BLD-SCNC: 139 MMOL/L (ref 136–145)
SP GR UR STRIP: 1.02 (ref 1–1.03)
SQUAMOUS #/AREA URNS HPF: ABNORMAL /HPF
UROBILINOGEN UR QL STRIP: ABNORMAL
WBC NRBC COR # BLD: 15.46 10*3/MM3 (ref 4.8–10.8)
WBC UR QL AUTO: ABNORMAL /HPF
WHOLE BLOOD HOLD SPECIMEN: NORMAL
WHOLE BLOOD HOLD SPECIMEN: NORMAL

## 2018-03-06 PROCEDURE — 87040 BLOOD CULTURE FOR BACTERIA: CPT | Performed by: EMERGENCY MEDICINE

## 2018-03-06 PROCEDURE — 71046 X-RAY EXAM CHEST 2 VIEWS: CPT

## 2018-03-06 PROCEDURE — 96374 THER/PROPH/DIAG INJ IV PUSH: CPT

## 2018-03-06 PROCEDURE — 85025 COMPLETE CBC W/AUTO DIFF WBC: CPT | Performed by: EMERGENCY MEDICINE

## 2018-03-06 PROCEDURE — 87186 SC STD MICRODIL/AGAR DIL: CPT | Performed by: EMERGENCY MEDICINE

## 2018-03-06 PROCEDURE — 96361 HYDRATE IV INFUSION ADD-ON: CPT

## 2018-03-06 PROCEDURE — 25010000002 ONDANSETRON PER 1 MG: Performed by: EMERGENCY MEDICINE

## 2018-03-06 PROCEDURE — 99284 EMERGENCY DEPT VISIT MOD MDM: CPT | Performed by: EMERGENCY MEDICINE

## 2018-03-06 PROCEDURE — 87150 DNA/RNA AMPLIFIED PROBE: CPT | Performed by: EMERGENCY MEDICINE

## 2018-03-06 PROCEDURE — 81001 URINALYSIS AUTO W/SCOPE: CPT | Performed by: EMERGENCY MEDICINE

## 2018-03-06 PROCEDURE — 87804 INFLUENZA ASSAY W/OPTIC: CPT | Performed by: EMERGENCY MEDICINE

## 2018-03-06 PROCEDURE — 83605 ASSAY OF LACTIC ACID: CPT | Performed by: EMERGENCY MEDICINE

## 2018-03-06 PROCEDURE — 96375 TX/PRO/DX INJ NEW DRUG ADDON: CPT

## 2018-03-06 PROCEDURE — 25010000002 FENTANYL CITRATE (PF) 100 MCG/2ML SOLUTION: Performed by: EMERGENCY MEDICINE

## 2018-03-06 PROCEDURE — 99285 EMERGENCY DEPT VISIT HI MDM: CPT

## 2018-03-06 PROCEDURE — 80053 COMPREHEN METABOLIC PANEL: CPT | Performed by: EMERGENCY MEDICINE

## 2018-03-06 RX ORDER — ONDANSETRON 2 MG/ML
4 INJECTION INTRAMUSCULAR; INTRAVENOUS ONCE
Status: COMPLETED | OUTPATIENT
Start: 2018-03-06 | End: 2018-03-06

## 2018-03-06 RX ORDER — ONDANSETRON 4 MG/1
4 TABLET, FILM COATED ORAL EVERY 6 HOURS PRN
Qty: 30 TABLET | Refills: 0 | Status: SHIPPED | OUTPATIENT
Start: 2018-03-06 | End: 2018-08-26

## 2018-03-06 RX ORDER — BENZONATATE 100 MG/1
100 CAPSULE ORAL 3 TIMES DAILY PRN
Qty: 30 CAPSULE | Refills: 0 | Status: SHIPPED | OUTPATIENT
Start: 2018-03-06 | End: 2018-08-26

## 2018-03-06 RX ORDER — SODIUM CHLORIDE 9 MG/ML
1000 INJECTION, SOLUTION INTRAVENOUS ONCE
Status: COMPLETED | OUTPATIENT
Start: 2018-03-06 | End: 2018-03-06

## 2018-03-06 RX ORDER — BUSPIRONE HYDROCHLORIDE 10 MG/1
10 TABLET ORAL 2 TIMES DAILY
COMMUNITY
End: 2018-08-26

## 2018-03-06 RX ORDER — SODIUM CHLORIDE 0.9 % (FLUSH) 0.9 %
10 SYRINGE (ML) INJECTION AS NEEDED
Status: DISCONTINUED | OUTPATIENT
Start: 2018-03-06 | End: 2018-03-06 | Stop reason: HOSPADM

## 2018-03-06 RX ORDER — AZITHROMYCIN 250 MG/1
500 TABLET, FILM COATED ORAL ONCE
Status: COMPLETED | OUTPATIENT
Start: 2018-03-06 | End: 2018-03-06

## 2018-03-06 RX ORDER — FENTANYL CITRATE 50 UG/ML
50 INJECTION, SOLUTION INTRAMUSCULAR; INTRAVENOUS ONCE
Status: COMPLETED | OUTPATIENT
Start: 2018-03-06 | End: 2018-03-06

## 2018-03-06 RX ORDER — ALBUTEROL SULFATE 90 UG/1
AEROSOL, METERED RESPIRATORY (INHALATION)
Qty: 1 INHALER | Refills: 0 | Status: SHIPPED | OUTPATIENT
Start: 2018-03-06 | End: 2018-08-26

## 2018-03-06 RX ORDER — AZITHROMYCIN 250 MG/1
TABLET, FILM COATED ORAL
Qty: 4 TABLET | Refills: 0 | Status: SHIPPED | OUTPATIENT
Start: 2018-03-06 | End: 2018-08-26

## 2018-03-06 RX ORDER — IBUPROFEN 400 MG/1
800 TABLET ORAL ONCE
Status: COMPLETED | OUTPATIENT
Start: 2018-03-06 | End: 2018-03-06

## 2018-03-06 RX ADMIN — AZITHROMYCIN 500 MG: 250 TABLET, FILM COATED ORAL at 18:42

## 2018-03-06 RX ADMIN — SODIUM CHLORIDE 1000 ML: 9 INJECTION, SOLUTION INTRAVENOUS at 18:43

## 2018-03-06 RX ADMIN — IBUPROFEN 800 MG: 400 TABLET ORAL at 18:38

## 2018-03-06 RX ADMIN — HYDROCODONE BITARTRATE AND HOMATROPINE METHYLBROMIDE 5 ML: 5; 1.5 SOLUTION ORAL at 18:42

## 2018-03-06 RX ADMIN — ONDANSETRON 4 MG: 2 INJECTION INTRAMUSCULAR; INTRAVENOUS at 17:23

## 2018-03-06 RX ADMIN — SODIUM CHLORIDE 1000 ML: 9 INJECTION, SOLUTION INTRAVENOUS at 17:23

## 2018-03-06 RX ADMIN — FENTANYL CITRATE 50 MCG: 50 INJECTION, SOLUTION INTRAMUSCULAR; INTRAVENOUS at 17:23
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
## 2018-03-06 NOTE — TELEPHONE ENCOUNTER
Pt called with c/o temp 102.4, body aches, unable to hold down food or fluids and coughing up yellow sputum. Reviewed with Kenyetta BRITT, Pt advised to go to an Urgent Care Center. Pt made aware and v/u.

## 2018-03-07 LAB — BACTERIA BLD CULT: ABNORMAL

## 2018-03-11 LAB — BACTERIA SPEC AEROBE CULT: NORMAL

## 2018-03-13 ENCOUNTER — OFFICE VISIT (OUTPATIENT)
Dept: ONCOLOGY | Facility: CLINIC | Age: 28
End: 2018-03-13

## 2018-03-13 ENCOUNTER — APPOINTMENT (OUTPATIENT)
Dept: LAB | Facility: HOSPITAL | Age: 28
End: 2018-03-13

## 2018-03-13 VITALS
DIASTOLIC BLOOD PRESSURE: 83 MMHG | BODY MASS INDEX: 36.45 KG/M2 | HEART RATE: 97 BPM | HEIGHT: 78 IN | WEIGHT: 315 LBS | TEMPERATURE: 97.8 F | OXYGEN SATURATION: 95 % | SYSTOLIC BLOOD PRESSURE: 149 MMHG | RESPIRATION RATE: 16 BRPM

## 2018-03-13 DIAGNOSIS — R78.81 STREPTOCOCCAL BACTEREMIA: ICD-10-CM

## 2018-03-13 DIAGNOSIS — C92.11 CHRONIC MYELOID LEUKEMIA, BCR/ABL-POSITIVE, IN REMISSION (HCC): Primary | ICD-10-CM

## 2018-03-13 DIAGNOSIS — B95.5 STREPTOCOCCAL BACTEREMIA: ICD-10-CM

## 2018-03-13 PROCEDURE — G0463 HOSPITAL OUTPT CLINIC VISIT: HCPCS | Performed by: INTERNAL MEDICINE

## 2018-03-13 PROCEDURE — 99214 OFFICE O/P EST MOD 30 MIN: CPT | Performed by: INTERNAL MEDICINE
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
## 2018-03-14 ENCOUNTER — LAB (OUTPATIENT)
Dept: LAB | Facility: HOSPITAL | Age: 28
End: 2018-03-14

## 2018-03-14 DIAGNOSIS — B95.5 STREPTOCOCCAL BACTEREMIA: ICD-10-CM

## 2018-03-14 DIAGNOSIS — R78.81 STREPTOCOCCAL BACTEREMIA: ICD-10-CM

## 2018-03-14 PROCEDURE — 87040 BLOOD CULTURE FOR BACTERIA: CPT

## 2018-03-18 LAB
BACTERIA SPEC AEROBE CULT: ABNORMAL
BACTERIA SPEC AEROBE CULT: ABNORMAL
GRAM STN SPEC: ABNORMAL

## 2018-03-19 ENCOUNTER — DOCUMENTATION (OUTPATIENT)
Dept: ONCOLOGY | Facility: CLINIC | Age: 28
End: 2018-03-19

## 2018-03-19 LAB
BACTERIA SPEC AEROBE CULT: NORMAL
BACTERIA SPEC AEROBE CULT: NORMAL

## 2018-03-20 ENCOUNTER — TRANSCRIBE ORDERS (OUTPATIENT)
Dept: ADMINISTRATIVE | Facility: HOSPITAL | Age: 28
End: 2018-03-20

## 2018-03-20 ENCOUNTER — HOSPITAL ENCOUNTER (OUTPATIENT)
Dept: GENERAL RADIOLOGY | Facility: HOSPITAL | Age: 28
Discharge: HOME | End: 2018-03-20
Admitting: PHYSICIAN ASSISTANT

## 2018-03-20 DIAGNOSIS — M54.5 LOW BACK PAIN, UNSPECIFIED BACK PAIN LATERALITY, UNSPECIFIED CHRONICITY, WITH SCIATICA PRESENCE UNSPECIFIED: ICD-10-CM

## 2018-03-20 DIAGNOSIS — M54.5 LOW BACK PAIN, UNSPECIFIED BACK PAIN LATERALITY, UNSPECIFIED CHRONICITY, WITH SCIATICA PRESENCE UNSPECIFIED: Primary | ICD-10-CM

## 2018-03-20 PROCEDURE — 72120 X-RAY BEND ONLY L-S SPINE: CPT

## 2018-04-10 ENCOUNTER — TRANSCRIBE ORDERS (OUTPATIENT)
Dept: ADMINISTRATIVE | Facility: HOSPITAL | Age: 28
End: 2018-04-10

## 2018-04-10 DIAGNOSIS — M51.36 DDD (DEGENERATIVE DISC DISEASE), LUMBAR: Primary | ICD-10-CM

## 2018-04-25 ENCOUNTER — HOSPITAL ENCOUNTER (OUTPATIENT)
Dept: MRI IMAGING | Facility: HOSPITAL | Age: 28
Discharge: HOME | End: 2018-04-25

## 2018-04-25 DIAGNOSIS — M51.36 DDD (DEGENERATIVE DISC DISEASE), LUMBAR: ICD-10-CM

## 2018-07-16 ENCOUNTER — DOCUMENTATION (OUTPATIENT)
Dept: ONCOLOGY | Facility: CLINIC | Age: 28
End: 2018-07-16

## 2018-07-16 NOTE — PROGRESS NOTES
Call rec from Maggie, Pharmacist at [facility]-She states she is trying to get ahold of the pt and both number they have listed for him (same as what we have) are no longer in service. The last time pt got his Sprycel filled was in May. Pt is to have labs done tomorrow, 7/17/18-I have added a note to the appt line to update pts phone number.

## 2018-07-17 ENCOUNTER — LAB (OUTPATIENT)
Dept: LAB | Facility: HOSPITAL | Age: 28
End: 2018-07-17

## 2018-07-17 DIAGNOSIS — C92.11 CHRONIC MYELOID LEUKEMIA, BCR/ABL-POSITIVE, IN REMISSION (HCC): ICD-10-CM

## 2018-07-17 LAB
ALBUMIN SERPL-MCNC: 4.1 G/DL (ref 3.5–5.2)
ALBUMIN/GLOB SERPL: 1.8 G/DL
ALP SERPL-CCNC: 71 U/L (ref 40–129)
ALT SERPL W P-5'-P-CCNC: 16 U/L (ref 5–41)
ANION GAP SERPL CALCULATED.3IONS-SCNC: 9.6 MMOL/L
AST SERPL-CCNC: 14 U/L (ref 5–40)
BASOPHILS # BLD AUTO: 0.05 10*3/MM3 (ref 0–0.2)
BASOPHILS NFR BLD AUTO: 0.4 % (ref 0–2)
BILIRUB SERPL-MCNC: 0.4 MG/DL (ref 0.2–1.2)
BUN BLD-MCNC: 12 MG/DL (ref 6–20)
BUN/CREAT SERPL: 15.4 (ref 7–25)
CALCIUM SPEC-SCNC: 8.8 MG/DL (ref 8.6–10.5)
CHLORIDE SERPL-SCNC: 107 MMOL/L (ref 98–107)
CO2 SERPL-SCNC: 24.4 MMOL/L (ref 22–29)
CREAT BLD-MCNC: 0.78 MG/DL (ref 0.76–1.27)
DEPRECATED RDW RBC AUTO: 48.4 FL (ref 37–54)
EOSINOPHIL # BLD AUTO: 0.46 10*3/MM3 (ref 0.1–0.3)
EOSINOPHIL NFR BLD AUTO: 3.8 % (ref 0–4)
ERYTHROCYTE [DISTWIDTH] IN BLOOD BY AUTOMATED COUNT: 13.3 % (ref 11.5–14.5)
GFR SERPL CREATININE-BSD FRML MDRD: 118 ML/MIN/1.73
GLOBULIN UR ELPH-MCNC: 2.3 GM/DL
GLUCOSE BLD-MCNC: 98 MG/DL (ref 65–99)
HCT VFR BLD AUTO: 42.4 % (ref 42–52)
HGB BLD-MCNC: 13.9 G/DL (ref 14–18)
IMM GRANULOCYTES # BLD: 0.03 10*3/MM3 (ref 0–0.03)
IMM GRANULOCYTES NFR BLD: 0.3 % (ref 0–0.5)
LYMPHOCYTES # BLD AUTO: 3.68 10*3/MM3 (ref 0.6–4.8)
LYMPHOCYTES NFR BLD AUTO: 30.8 % (ref 20–45)
MCH RBC QN AUTO: 32.4 PG (ref 27–31)
MCHC RBC AUTO-ENTMCNC: 32.8 G/DL (ref 31–37)
MCV RBC AUTO: 98.8 FL (ref 80–94)
MONOCYTES # BLD AUTO: 1.31 10*3/MM3 (ref 0–1)
MONOCYTES NFR BLD AUTO: 11 % (ref 3–8)
NEUTROPHILS # BLD AUTO: 6.42 10*3/MM3 (ref 1.5–8.3)
NEUTROPHILS NFR BLD AUTO: 53.7 % (ref 45–70)
NRBC BLD MANUAL-RTO: 0 /100 WBC (ref 0–0)
PLATELET # BLD AUTO: 155 10*3/MM3 (ref 140–500)
PMV BLD AUTO: 11.4 FL (ref 7.4–10.4)
POTASSIUM BLD-SCNC: 4.1 MMOL/L (ref 3.5–5.2)
PROT SERPL-MCNC: 6.4 G/DL (ref 6–8.5)
RBC # BLD AUTO: 4.29 10*6/MM3 (ref 4.7–6.1)
SODIUM BLD-SCNC: 141 MMOL/L (ref 136–145)
WBC NRBC COR # BLD: 11.95 10*3/MM3 (ref 4.8–10.8)

## 2018-07-17 PROCEDURE — 80053 COMPREHEN METABOLIC PANEL: CPT | Performed by: INTERNAL MEDICINE

## 2018-07-17 PROCEDURE — 85025 COMPLETE CBC W/AUTO DIFF WBC: CPT | Performed by: INTERNAL MEDICINE

## 2018-07-17 PROCEDURE — 81206 BCR/ABL1 GENE MAJOR BP: CPT | Performed by: INTERNAL MEDICINE

## 2018-07-17 PROCEDURE — 81207 BCR/ABL1 GENE MINOR BP: CPT | Performed by: INTERNAL MEDICINE

## 2018-07-17 PROCEDURE — 36415 COLL VENOUS BLD VENIPUNCTURE: CPT | Performed by: INTERNAL MEDICINE
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
## 2018-07-30 LAB — REF LAB TEST METHOD: NORMAL

## 2018-07-31 ENCOUNTER — APPOINTMENT (OUTPATIENT)
Dept: LAB | Facility: HOSPITAL | Age: 28
End: 2018-07-31

## 2018-07-31 ENCOUNTER — OFFICE VISIT (OUTPATIENT)
Dept: ONCOLOGY | Facility: CLINIC | Age: 28
End: 2018-07-31

## 2018-07-31 VITALS
HEIGHT: 78 IN | BODY MASS INDEX: 36.45 KG/M2 | DIASTOLIC BLOOD PRESSURE: 82 MMHG | TEMPERATURE: 98 F | HEART RATE: 79 BPM | WEIGHT: 315 LBS | OXYGEN SATURATION: 99 % | SYSTOLIC BLOOD PRESSURE: 144 MMHG

## 2018-07-31 DIAGNOSIS — C92.11 CHRONIC MYELOID LEUKEMIA, BCR/ABL-POSITIVE, IN REMISSION (HCC): Primary | ICD-10-CM

## 2018-07-31 DIAGNOSIS — Z51.81 THERAPEUTIC DRUG MONITORING: ICD-10-CM

## 2018-07-31 DIAGNOSIS — R61 NIGHT SWEATS: ICD-10-CM

## 2018-07-31 LAB
BASOPHILS # BLD AUTO: 0.04 10*3/MM3 (ref 0–0.2)
BASOPHILS NFR BLD AUTO: 0.5 % (ref 0–2)
DEPRECATED RDW RBC AUTO: 47.8 FL (ref 37–54)
EOSINOPHIL # BLD AUTO: 0.32 10*3/MM3 (ref 0.1–0.3)
EOSINOPHIL NFR BLD AUTO: 3.7 % (ref 0–4)
ERYTHROCYTE [DISTWIDTH] IN BLOOD BY AUTOMATED COUNT: 13.2 % (ref 11.5–14.5)
HCT VFR BLD AUTO: 45 % (ref 42–52)
HGB BLD-MCNC: 14.5 G/DL (ref 14–18)
IMM GRANULOCYTES # BLD: 0.02 10*3/MM3 (ref 0–0.03)
IMM GRANULOCYTES NFR BLD: 0.2 % (ref 0–0.5)
LDH SERPL-CCNC: 175 U/L (ref 135–225)
LYMPHOCYTES # BLD AUTO: 1.69 10*3/MM3 (ref 0.6–4.8)
LYMPHOCYTES NFR BLD AUTO: 19.5 % (ref 20–45)
MCH RBC QN AUTO: 31.8 PG (ref 27–31)
MCHC RBC AUTO-ENTMCNC: 32.2 G/DL (ref 31–37)
MCV RBC AUTO: 98.7 FL (ref 80–94)
MONOCYTES # BLD AUTO: 0.76 10*3/MM3 (ref 0–1)
MONOCYTES NFR BLD AUTO: 8.8 % (ref 3–8)
NEUTROPHILS # BLD AUTO: 5.84 10*3/MM3 (ref 1.5–8.3)
NEUTROPHILS NFR BLD AUTO: 67.3 % (ref 45–70)
NRBC BLD MANUAL-RTO: 0 /100 WBC (ref 0–0)
PLATELET # BLD AUTO: 191 10*3/MM3 (ref 140–500)
PMV BLD AUTO: 11.9 FL (ref 7.4–10.4)
RBC # BLD AUTO: 4.56 10*6/MM3 (ref 4.7–6.1)
WBC NRBC COR # BLD: 8.67 10*3/MM3 (ref 4.8–10.8)

## 2018-07-31 PROCEDURE — 36415 COLL VENOUS BLD VENIPUNCTURE: CPT | Performed by: INTERNAL MEDICINE

## 2018-07-31 PROCEDURE — 83615 LACTATE (LD) (LDH) ENZYME: CPT | Performed by: INTERNAL MEDICINE

## 2018-07-31 PROCEDURE — 85025 COMPLETE CBC W/AUTO DIFF WBC: CPT | Performed by: INTERNAL MEDICINE

## 2018-07-31 PROCEDURE — 99215 OFFICE O/P EST HI 40 MIN: CPT | Performed by: INTERNAL MEDICINE

## 2018-08-26 ENCOUNTER — APPOINTMENT (OUTPATIENT)
Dept: GENERAL RADIOLOGY | Facility: HOSPITAL | Age: 28
End: 2018-08-26

## 2018-08-26 ENCOUNTER — HOSPITAL ENCOUNTER (EMERGENCY)
Facility: HOSPITAL | Age: 28
Discharge: HOME | End: 2018-08-27
Attending: EMERGENCY MEDICINE | Admitting: EMERGENCY MEDICINE

## 2018-08-26 DIAGNOSIS — M79.602 MUSCULOSKELETAL PAIN OF LEFT UPPER EXTREMITY: Primary | ICD-10-CM

## 2018-08-26 PROCEDURE — 99283 EMERGENCY DEPT VISIT LOW MDM: CPT

## 2018-08-26 PROCEDURE — 99282 EMERGENCY DEPT VISIT SF MDM: CPT | Performed by: EMERGENCY MEDICINE

## 2018-08-26 PROCEDURE — 73060 X-RAY EXAM OF HUMERUS: CPT

## 2018-08-26 PROCEDURE — 73030 X-RAY EXAM OF SHOULDER: CPT

## 2018-08-26 RX ORDER — CYCLOBENZAPRINE HCL 10 MG
TABLET ORAL
Qty: 20 TABLET | Refills: 0 | Status: SHIPPED | OUTPATIENT
Start: 2018-08-26 | End: 2019-01-03

## 2018-08-26 RX ORDER — HYDROCODONE BITARTRATE AND ACETAMINOPHEN 5; 325 MG/1; MG/1
1 TABLET ORAL ONCE
Status: COMPLETED | OUTPATIENT
Start: 2018-08-26 | End: 2018-08-26

## 2018-08-26 RX ADMIN — HYDROCODONE BITARTRATE AND ACETAMINOPHEN 1 TABLET: 5; 325 TABLET ORAL at 22:52

## 2018-08-27 VITALS
HEIGHT: 78 IN | HEART RATE: 78 BPM | WEIGHT: 315 LBS | TEMPERATURE: 98.2 F | RESPIRATION RATE: 14 BRPM | BODY MASS INDEX: 36.45 KG/M2 | DIASTOLIC BLOOD PRESSURE: 100 MMHG | SYSTOLIC BLOOD PRESSURE: 167 MMHG | OXYGEN SATURATION: 98 %

## 2018-09-12 ENCOUNTER — LAB (OUTPATIENT)
Dept: LAB | Facility: HOSPITAL | Age: 28
End: 2018-09-12

## 2018-09-12 DIAGNOSIS — Z51.81 THERAPEUTIC DRUG MONITORING: ICD-10-CM

## 2018-09-12 DIAGNOSIS — C92.11 CHRONIC MYELOID LEUKEMIA, BCR/ABL-POSITIVE, IN REMISSION (HCC): ICD-10-CM

## 2018-09-12 LAB
ALBUMIN SERPL-MCNC: 4.4 G/DL (ref 3.5–5.2)
ALBUMIN/GLOB SERPL: 1.8 G/DL
ALP SERPL-CCNC: 76 U/L (ref 40–129)
ALT SERPL W P-5'-P-CCNC: 17 U/L (ref 5–41)
ANION GAP SERPL CALCULATED.3IONS-SCNC: 10.9 MMOL/L
AST SERPL-CCNC: 15 U/L (ref 5–40)
BASOPHILS # BLD AUTO: 0.03 10*3/MM3 (ref 0–0.2)
BASOPHILS NFR BLD AUTO: 0.3 % (ref 0–2)
BILIRUB SERPL-MCNC: 0.2 MG/DL (ref 0.2–1.2)
BUN BLD-MCNC: 9 MG/DL (ref 6–20)
BUN/CREAT SERPL: 10 (ref 7–25)
CALCIUM SPEC-SCNC: 8.7 MG/DL (ref 8.6–10.5)
CHLORIDE SERPL-SCNC: 108 MMOL/L (ref 98–107)
CO2 SERPL-SCNC: 23.1 MMOL/L (ref 22–29)
CREAT BLD-MCNC: 0.9 MG/DL (ref 0.76–1.27)
DEPRECATED RDW RBC AUTO: 47.3 FL (ref 37–54)
EOSINOPHIL # BLD AUTO: 0.35 10*3/MM3 (ref 0.1–0.3)
EOSINOPHIL NFR BLD AUTO: 3.5 % (ref 0–4)
ERYTHROCYTE [DISTWIDTH] IN BLOOD BY AUTOMATED COUNT: 13 % (ref 11.5–14.5)
GFR SERPL CREATININE-BSD FRML MDRD: 99 ML/MIN/1.73
GLOBULIN UR ELPH-MCNC: 2.4 GM/DL
GLUCOSE BLD-MCNC: 100 MG/DL (ref 65–99)
HCT VFR BLD AUTO: 43.7 % (ref 42–52)
HGB BLD-MCNC: 14.3 G/DL (ref 14–18)
IMM GRANULOCYTES # BLD: 0.04 10*3/MM3 (ref 0–0.03)
IMM GRANULOCYTES NFR BLD: 0.4 % (ref 0–0.5)
LYMPHOCYTES # BLD AUTO: 2.22 10*3/MM3 (ref 0.6–4.8)
LYMPHOCYTES NFR BLD AUTO: 22.1 % (ref 20–45)
MCH RBC QN AUTO: 32.2 PG (ref 27–31)
MCHC RBC AUTO-ENTMCNC: 32.7 G/DL (ref 31–37)
MCV RBC AUTO: 98.4 FL (ref 80–94)
MONOCYTES # BLD AUTO: 1.09 10*3/MM3 (ref 0–1)
MONOCYTES NFR BLD AUTO: 10.8 % (ref 3–8)
NEUTROPHILS # BLD AUTO: 6.33 10*3/MM3 (ref 1.5–8.3)
NEUTROPHILS NFR BLD AUTO: 62.9 % (ref 45–70)
NRBC BLD MANUAL-RTO: 0 /100 WBC (ref 0–0)
PLATELET # BLD AUTO: 195 10*3/MM3 (ref 140–500)
PMV BLD AUTO: 11.1 FL (ref 7.4–10.4)
POTASSIUM BLD-SCNC: 3.9 MMOL/L (ref 3.5–5.2)
PROT SERPL-MCNC: 6.8 G/DL (ref 6–8.5)
RBC # BLD AUTO: 4.44 10*6/MM3 (ref 4.7–6.1)
SODIUM BLD-SCNC: 142 MMOL/L (ref 136–145)
WBC NRBC COR # BLD: 10.06 10*3/MM3 (ref 4.8–10.8)

## 2018-09-12 PROCEDURE — 85025 COMPLETE CBC W/AUTO DIFF WBC: CPT

## 2018-09-12 PROCEDURE — 81206 BCR/ABL1 GENE MAJOR BP: CPT

## 2018-09-12 PROCEDURE — 81207 BCR/ABL1 GENE MINOR BP: CPT

## 2018-09-12 PROCEDURE — 80053 COMPREHEN METABOLIC PANEL: CPT

## 2018-09-12 PROCEDURE — 36415 COLL VENOUS BLD VENIPUNCTURE: CPT

## 2018-09-13 ENCOUNTER — APPOINTMENT (OUTPATIENT)
Dept: LAB | Facility: HOSPITAL | Age: 28
End: 2018-09-13

## 2018-09-19 LAB — REF LAB TEST METHOD: NORMAL

## 2018-09-27 ENCOUNTER — OFFICE VISIT (OUTPATIENT)
Dept: ONCOLOGY | Facility: CLINIC | Age: 28
End: 2018-09-27

## 2018-09-27 ENCOUNTER — LAB (OUTPATIENT)
Dept: LAB | Facility: HOSPITAL | Age: 28
End: 2018-09-27

## 2018-09-27 VITALS
HEART RATE: 81 BPM | SYSTOLIC BLOOD PRESSURE: 159 MMHG | OXYGEN SATURATION: 98 % | BODY MASS INDEX: 36.45 KG/M2 | WEIGHT: 315 LBS | TEMPERATURE: 97.9 F | RESPIRATION RATE: 12 BRPM | DIASTOLIC BLOOD PRESSURE: 85 MMHG | HEIGHT: 78 IN

## 2018-09-27 DIAGNOSIS — C92.11 CHRONIC MYELOID LEUKEMIA, BCR/ABL-POSITIVE, IN REMISSION (HCC): Primary | ICD-10-CM

## 2018-09-27 DIAGNOSIS — Z51.81 THERAPEUTIC DRUG MONITORING: ICD-10-CM

## 2018-09-27 PROCEDURE — 99214 OFFICE O/P EST MOD 30 MIN: CPT | Performed by: INTERNAL MEDICINE

## 2018-09-27 RX ORDER — CETIRIZINE HYDROCHLORIDE 10 MG/1
10 TABLET ORAL DAILY
COMMUNITY
End: 2019-01-28
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
## 2018-11-21 ENCOUNTER — DOCUMENTATION (OUTPATIENT)
Dept: ONCOLOGY | Facility: CLINIC | Age: 28
End: 2018-11-21

## 2018-11-21 NOTE — PROGRESS NOTES
Fax rec from [facility] requesting a new Sprycel rx for pt. Per last office note from Dr Riddle-Pt will continue. I have escribed a new rx to [facility].

## 2018-12-07 ENCOUNTER — APPOINTMENT (OUTPATIENT)
Dept: CT IMAGING | Facility: HOSPITAL | Age: 28
End: 2018-12-07

## 2018-12-07 ENCOUNTER — HOSPITAL ENCOUNTER (EMERGENCY)
Facility: HOSPITAL | Age: 28
Discharge: HOME | End: 2018-12-07
Attending: EMERGENCY MEDICINE | Admitting: EMERGENCY MEDICINE

## 2018-12-07 VITALS
OXYGEN SATURATION: 100 % | HEART RATE: 92 BPM | SYSTOLIC BLOOD PRESSURE: 152 MMHG | TEMPERATURE: 98.3 F | BODY MASS INDEX: 36.45 KG/M2 | DIASTOLIC BLOOD PRESSURE: 88 MMHG | HEIGHT: 78 IN | WEIGHT: 315 LBS | RESPIRATION RATE: 18 BRPM

## 2018-12-07 DIAGNOSIS — K11.21 ACUTE PAROTITIS: Primary | ICD-10-CM

## 2018-12-07 LAB
ANION GAP SERPL CALCULATED.3IONS-SCNC: 7.2 MMOL/L
BASOPHILS # BLD AUTO: 0.06 10*3/MM3 (ref 0–0.2)
BASOPHILS NFR BLD AUTO: 0.6 % (ref 0–2)
BUN BLD-MCNC: 14 MG/DL (ref 6–20)
BUN/CREAT SERPL: 12.7 (ref 7–25)
CALCIUM SPEC-SCNC: 8.8 MG/DL (ref 8.6–10.5)
CHLORIDE SERPL-SCNC: 103 MMOL/L (ref 98–107)
CO2 SERPL-SCNC: 31.8 MMOL/L (ref 22–29)
CREAT BLD-MCNC: 1.1 MG/DL (ref 0.76–1.27)
DEPRECATED RDW RBC AUTO: 46.1 FL (ref 37–54)
EOSINOPHIL # BLD AUTO: 0.34 10*3/MM3 (ref 0.1–0.3)
EOSINOPHIL NFR BLD AUTO: 3.1 % (ref 0–4)
ERYTHROCYTE [DISTWIDTH] IN BLOOD BY AUTOMATED COUNT: 12.6 % (ref 11.5–14.5)
GFR SERPL CREATININE-BSD FRML MDRD: 79 ML/MIN/1.73
GLUCOSE BLD-MCNC: 91 MG/DL (ref 65–99)
HCT VFR BLD AUTO: 44 % (ref 42–52)
HGB BLD-MCNC: 14.3 G/DL (ref 14–18)
IMM GRANULOCYTES # BLD: 0.03 10*3/MM3 (ref 0–0.03)
IMM GRANULOCYTES NFR BLD: 0.3 % (ref 0–0.5)
LYMPHOCYTES # BLD AUTO: 1.55 10*3/MM3 (ref 0.6–4.8)
LYMPHOCYTES NFR BLD AUTO: 14.3 % (ref 20–45)
MCH RBC QN AUTO: 32.1 PG (ref 27–31)
MCHC RBC AUTO-ENTMCNC: 32.5 G/DL (ref 31–37)
MCV RBC AUTO: 98.9 FL (ref 80–94)
MONOCYTES # BLD AUTO: 1.23 10*3/MM3 (ref 0–1)
MONOCYTES NFR BLD AUTO: 11.4 % (ref 3–8)
NEUTROPHILS # BLD AUTO: 7.62 10*3/MM3 (ref 1.5–8.3)
NEUTROPHILS NFR BLD AUTO: 70.3 % (ref 45–70)
NRBC BLD MANUAL-RTO: 0 /100 WBC (ref 0–0)
PLATELET # BLD AUTO: 183 10*3/MM3 (ref 140–500)
PMV BLD AUTO: 11.3 FL (ref 7.4–10.4)
POTASSIUM BLD-SCNC: 3.6 MMOL/L (ref 3.5–5.2)
RBC # BLD AUTO: 4.45 10*6/MM3 (ref 4.7–6.1)
SODIUM BLD-SCNC: 142 MMOL/L (ref 136–145)
WBC NRBC COR # BLD: 10.83 10*3/MM3 (ref 4.8–10.8)

## 2018-12-07 PROCEDURE — 99284 EMERGENCY DEPT VISIT MOD MDM: CPT

## 2018-12-07 PROCEDURE — 85025 COMPLETE CBC W/AUTO DIFF WBC: CPT | Performed by: EMERGENCY MEDICINE

## 2018-12-07 PROCEDURE — 70491 CT SOFT TISSUE NECK W/DYE: CPT

## 2018-12-07 PROCEDURE — 0 IOPAMIDOL PER 1 ML: Performed by: EMERGENCY MEDICINE

## 2018-12-07 PROCEDURE — 99284 EMERGENCY DEPT VISIT MOD MDM: CPT | Performed by: EMERGENCY MEDICINE

## 2018-12-07 PROCEDURE — 80048 BASIC METABOLIC PNL TOTAL CA: CPT | Performed by: EMERGENCY MEDICINE

## 2018-12-07 RX ORDER — CEPHALEXIN 500 MG/1
500 CAPSULE ORAL 3 TIMES DAILY
Qty: 21 CAPSULE | Refills: 0 | Status: SHIPPED | OUTPATIENT
Start: 2018-12-07 | End: 2018-12-14

## 2018-12-07 RX ORDER — SODIUM CHLORIDE 0.9 % (FLUSH) 0.9 %
10 SYRINGE (ML) INJECTION AS NEEDED
Status: DISCONTINUED | OUTPATIENT
Start: 2018-12-07 | End: 2018-12-08 | Stop reason: HOSPADM

## 2018-12-07 RX ORDER — HYDROCODONE BITARTRATE AND ACETAMINOPHEN 5; 325 MG/1; MG/1
2 TABLET ORAL ONCE
Status: COMPLETED | OUTPATIENT
Start: 2018-12-07 | End: 2018-12-07

## 2018-12-07 RX ORDER — CEPHALEXIN 500 MG/1
500 CAPSULE ORAL ONCE
Status: COMPLETED | OUTPATIENT
Start: 2018-12-07 | End: 2018-12-07

## 2018-12-07 RX ADMIN — HYDROCODONE BITARTRATE AND ACETAMINOPHEN 2 TABLET: 5; 325 TABLET ORAL at 22:24

## 2018-12-07 RX ADMIN — CEPHALEXIN 500 MG: 500 CAPSULE ORAL at 23:44

## 2018-12-07 RX ADMIN — IOPAMIDOL 100 ML: 755 INJECTION, SOLUTION INTRAVENOUS at 23:29
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
## 2018-12-08 NOTE — DISCHARGE INSTRUCTIONS
Medication as directed.  Follow-up with your PCP or with ENT as discussed.  Return to ED for medical emergencies.

## 2019-01-03 ENCOUNTER — OFFICE VISIT (OUTPATIENT)
Dept: CARDIOLOGY | Facility: CLINIC | Age: 29
End: 2019-01-03

## 2019-01-03 VITALS
WEIGHT: 315 LBS | DIASTOLIC BLOOD PRESSURE: 86 MMHG | SYSTOLIC BLOOD PRESSURE: 150 MMHG | BODY MASS INDEX: 36.45 KG/M2 | HEART RATE: 81 BPM | HEIGHT: 78 IN

## 2019-01-03 DIAGNOSIS — Z01.810 PREOP CARDIOVASCULAR EXAM: Primary | ICD-10-CM

## 2019-01-03 PROCEDURE — 93000 ELECTROCARDIOGRAM COMPLETE: CPT | Performed by: INTERNAL MEDICINE

## 2019-01-03 PROCEDURE — 99202 OFFICE O/P NEW SF 15 MIN: CPT | Performed by: INTERNAL MEDICINE

## 2019-01-03 RX ORDER — IBUPROFEN 200 MG
1 TABLET ORAL AS NEEDED
COMMUNITY
Start: 2016-04-29 | End: 2019-01-28

## 2019-01-03 RX ORDER — DIPHENHYDRAMINE HCL 25 MG
1 TABLET ORAL DAILY
COMMUNITY
Start: 2016-09-30 | End: 2019-01-28
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
## 2019-01-15 ENCOUNTER — LAB (OUTPATIENT)
Dept: LAB | Facility: HOSPITAL | Age: 29
End: 2019-01-15

## 2019-01-15 DIAGNOSIS — C92.11 CHRONIC MYELOID LEUKEMIA, BCR/ABL-POSITIVE, IN REMISSION (HCC): ICD-10-CM

## 2019-01-15 DIAGNOSIS — Z51.81 THERAPEUTIC DRUG MONITORING: ICD-10-CM

## 2019-01-15 LAB
ALBUMIN SERPL-MCNC: 4.2 G/DL (ref 3.5–5.2)
ALBUMIN/GLOB SERPL: 1.6 G/DL
ALP SERPL-CCNC: 73 U/L (ref 40–129)
ALT SERPL W P-5'-P-CCNC: 20 U/L (ref 5–41)
ANION GAP SERPL CALCULATED.3IONS-SCNC: 9.5 MMOL/L
AST SERPL-CCNC: 17 U/L (ref 5–40)
BASOPHILS # BLD AUTO: 0.05 10*3/MM3 (ref 0–0.2)
BASOPHILS NFR BLD AUTO: 0.5 % (ref 0–2)
BILIRUB SERPL-MCNC: 0.2 MG/DL (ref 0.2–1.2)
BUN BLD-MCNC: 12 MG/DL (ref 6–20)
BUN/CREAT SERPL: 13.2 (ref 7–25)
CALCIUM SPEC-SCNC: 8.9 MG/DL (ref 8.6–10.5)
CHLORIDE SERPL-SCNC: 106 MMOL/L (ref 98–107)
CO2 SERPL-SCNC: 26.5 MMOL/L (ref 22–29)
CREAT BLD-MCNC: 0.91 MG/DL (ref 0.76–1.27)
DEPRECATED RDW RBC AUTO: 45.8 FL (ref 37–54)
EOSINOPHIL # BLD AUTO: 0.39 10*3/MM3 (ref 0.1–0.3)
EOSINOPHIL NFR BLD AUTO: 4.2 % (ref 0–4)
ERYTHROCYTE [DISTWIDTH] IN BLOOD BY AUTOMATED COUNT: 12.7 % (ref 11.5–14.5)
GFR SERPL CREATININE-BSD FRML MDRD: 98 ML/MIN/1.73
GLOBULIN UR ELPH-MCNC: 2.6 GM/DL
GLUCOSE BLD-MCNC: 110 MG/DL (ref 65–99)
HCT VFR BLD AUTO: 46.3 % (ref 42–52)
HGB BLD-MCNC: 14.9 G/DL (ref 14–18)
IMM GRANULOCYTES # BLD AUTO: 0.04 10*3/MM3 (ref 0–0.03)
IMM GRANULOCYTES NFR BLD AUTO: 0.4 % (ref 0–0.5)
LYMPHOCYTES # BLD AUTO: 2.44 10*3/MM3 (ref 0.6–4.8)
LYMPHOCYTES NFR BLD AUTO: 26.5 % (ref 20–45)
MCH RBC QN AUTO: 31.4 PG (ref 27–31)
MCHC RBC AUTO-ENTMCNC: 32.2 G/DL (ref 31–37)
MCV RBC AUTO: 97.7 FL (ref 80–94)
MONOCYTES # BLD AUTO: 1 10*3/MM3 (ref 0–1)
MONOCYTES NFR BLD AUTO: 10.9 % (ref 3–8)
NEUTROPHILS # BLD AUTO: 5.29 10*3/MM3 (ref 1.5–8.3)
NEUTROPHILS NFR BLD AUTO: 57.5 % (ref 45–70)
NRBC BLD AUTO-RTO: 0 /100 WBC (ref 0–0)
PLATELET # BLD AUTO: 231 10*3/MM3 (ref 140–500)
PMV BLD AUTO: 11.6 FL (ref 7.4–10.4)
POTASSIUM BLD-SCNC: 3.9 MMOL/L (ref 3.5–5.2)
PROT SERPL-MCNC: 6.8 G/DL (ref 6–8.5)
RBC # BLD AUTO: 4.74 10*6/MM3 (ref 4.7–6.1)
SODIUM BLD-SCNC: 142 MMOL/L (ref 136–145)
T4 FREE SERPL-MCNC: 1.12 NG/DL (ref 0.93–1.7)
TSH SERPL DL<=0.05 MIU/L-ACNC: 1.29 MIU/ML (ref 0.27–4.2)
WBC NRBC COR # BLD: 9.21 10*3/MM3 (ref 4.8–10.8)

## 2019-01-15 PROCEDURE — 85025 COMPLETE CBC W/AUTO DIFF WBC: CPT | Performed by: INTERNAL MEDICINE

## 2019-01-15 PROCEDURE — 81206 BCR/ABL1 GENE MAJOR BP: CPT

## 2019-01-15 PROCEDURE — 84439 ASSAY OF FREE THYROXINE: CPT

## 2019-01-15 PROCEDURE — 81207 BCR/ABL1 GENE MINOR BP: CPT

## 2019-01-15 PROCEDURE — 36415 COLL VENOUS BLD VENIPUNCTURE: CPT | Performed by: INTERNAL MEDICINE

## 2019-01-15 PROCEDURE — 84443 ASSAY THYROID STIM HORMONE: CPT

## 2019-01-15 PROCEDURE — 80053 COMPREHEN METABOLIC PANEL: CPT

## 2019-01-25 LAB — REF LAB TEST METHOD: NORMAL

## 2019-01-28 ENCOUNTER — HOSPITAL ENCOUNTER (INPATIENT)
Facility: HOSPITAL | Age: 29
LOS: 3 days | Discharge: HOME | End: 2019-01-31
Attending: EMERGENCY MEDICINE | Admitting: INTERNAL MEDICINE

## 2019-01-28 DIAGNOSIS — L03.211 FACIAL CELLULITIS: Primary | ICD-10-CM

## 2019-01-28 DIAGNOSIS — H60.11 CELLULITIS OF RIGHT EAR: ICD-10-CM

## 2019-01-28 LAB
ALBUMIN SERPL-MCNC: 4.1 G/DL (ref 3.5–5.2)
ALBUMIN/GLOB SERPL: 1.4 G/DL
ALP SERPL-CCNC: 74 U/L (ref 40–129)
ALT SERPL W P-5'-P-CCNC: 22 U/L (ref 5–41)
ANION GAP SERPL CALCULATED.3IONS-SCNC: 12.4 MMOL/L
AST SERPL-CCNC: 21 U/L (ref 5–40)
BASOPHILS # BLD AUTO: 0.04 10*3/MM3 (ref 0–0.2)
BASOPHILS NFR BLD AUTO: 0.3 % (ref 0–2)
BILIRUB SERPL-MCNC: 0.4 MG/DL (ref 0.2–1.2)
BUN BLD-MCNC: 12 MG/DL (ref 6–20)
BUN/CREAT SERPL: 12.6 (ref 7–25)
CALCIUM SPEC-SCNC: 8.5 MG/DL (ref 8.6–10.5)
CHLORIDE SERPL-SCNC: 94 MMOL/L (ref 98–107)
CO2 SERPL-SCNC: 23.6 MMOL/L (ref 22–29)
CREAT BLD-MCNC: 0.95 MG/DL (ref 0.76–1.27)
D-LACTATE SERPL-SCNC: 0.6 MMOL/L (ref 0.5–2)
DEPRECATED RDW RBC AUTO: 45.8 FL (ref 37–54)
EOSINOPHIL # BLD AUTO: 0.08 10*3/MM3 (ref 0.1–0.3)
EOSINOPHIL NFR BLD AUTO: 0.6 % (ref 0–4)
ERYTHROCYTE [DISTWIDTH] IN BLOOD BY AUTOMATED COUNT: 12.8 % (ref 11.5–14.5)
FLUAV AG NPH QL: NEGATIVE
FLUBV AG NPH QL IA: NEGATIVE
GFR SERPL CREATININE-BSD FRML MDRD: 93 ML/MIN/1.73
GLOBULIN UR ELPH-MCNC: 2.9 GM/DL
GLUCOSE BLD-MCNC: 98 MG/DL (ref 65–99)
HCT VFR BLD AUTO: 42 % (ref 42–52)
HGB BLD-MCNC: 13.8 G/DL (ref 14–18)
IMM GRANULOCYTES # BLD AUTO: 0.05 10*3/MM3 (ref 0–0.03)
IMM GRANULOCYTES NFR BLD AUTO: 0.4 % (ref 0–0.5)
LYMPHOCYTES # BLD AUTO: 1.01 10*3/MM3 (ref 0.6–4.8)
LYMPHOCYTES NFR BLD AUTO: 7.4 % (ref 20–45)
MCH RBC QN AUTO: 31.9 PG (ref 27–31)
MCHC RBC AUTO-ENTMCNC: 32.9 G/DL (ref 31–37)
MCV RBC AUTO: 97 FL (ref 80–94)
MONOCYTES # BLD AUTO: 1.42 10*3/MM3 (ref 0–1)
MONOCYTES NFR BLD AUTO: 10.4 % (ref 3–8)
NEUTROPHILS # BLD AUTO: 11 10*3/MM3 (ref 1.5–8.3)
NEUTROPHILS NFR BLD AUTO: 80.9 % (ref 45–70)
NRBC BLD AUTO-RTO: 0 /100 WBC (ref 0–0)
PLATELET # BLD AUTO: 158 10*3/MM3 (ref 140–500)
PMV BLD AUTO: 11 FL (ref 7.4–10.4)
POTASSIUM BLD-SCNC: 3.6 MMOL/L (ref 3.5–5.2)
PROT SERPL-MCNC: 7 G/DL (ref 6–8.5)
RBC # BLD AUTO: 4.33 10*6/MM3 (ref 4.7–6.1)
SODIUM BLD-SCNC: 130 MMOL/L (ref 136–145)
WBC NRBC COR # BLD: 13.6 10*3/MM3 (ref 4.8–10.8)

## 2019-01-28 PROCEDURE — 25010000002 VANCOMYCIN 1 G RECONSTITUTED SOLUTION 1 EACH VIAL: Performed by: PHYSICIAN ASSISTANT

## 2019-01-28 PROCEDURE — G0378 HOSPITAL OBSERVATION PER HR: HCPCS

## 2019-01-28 PROCEDURE — 99284 EMERGENCY DEPT VISIT MOD MDM: CPT | Performed by: PHYSICIAN ASSISTANT

## 2019-01-28 PROCEDURE — 87040 BLOOD CULTURE FOR BACTERIA: CPT | Performed by: PHYSICIAN ASSISTANT

## 2019-01-28 PROCEDURE — 99284 EMERGENCY DEPT VISIT MOD MDM: CPT

## 2019-01-28 PROCEDURE — 87804 INFLUENZA ASSAY W/OPTIC: CPT | Performed by: EMERGENCY MEDICINE

## 2019-01-28 PROCEDURE — 99222 1ST HOSP IP/OBS MODERATE 55: CPT | Performed by: HOSPITALIST

## 2019-01-28 PROCEDURE — 80053 COMPREHEN METABOLIC PANEL: CPT | Performed by: PHYSICIAN ASSISTANT

## 2019-01-28 PROCEDURE — 85025 COMPLETE CBC W/AUTO DIFF WBC: CPT | Performed by: PHYSICIAN ASSISTANT

## 2019-01-28 PROCEDURE — 25010000002 VANCOMYCIN 1 G RECONSTITUTED SOLUTION

## 2019-01-28 PROCEDURE — 25810000003 SODIUM CHLORIDE 0.9 % WITH KCL 20 MEQ 20-0.9 MEQ/L-% SOLUTION

## 2019-01-28 PROCEDURE — 83605 ASSAY OF LACTIC ACID: CPT | Performed by: PHYSICIAN ASSISTANT

## 2019-01-28 RX ORDER — IBUPROFEN 400 MG/1
TABLET ORAL
Status: COMPLETED
Start: 2019-01-28 | End: 2019-01-28

## 2019-01-28 RX ORDER — SODIUM CHLORIDE 9 MG/ML
INJECTION, SOLUTION INTRAVENOUS
Status: COMPLETED
Start: 2019-01-28 | End: 2019-01-28

## 2019-01-28 RX ORDER — SODIUM CHLORIDE 9 MG/ML
40 INJECTION, SOLUTION INTRAVENOUS AS NEEDED
Status: DISCONTINUED | OUTPATIENT
Start: 2019-01-28 | End: 2019-01-31 | Stop reason: HOSPADM

## 2019-01-28 RX ORDER — VANCOMYCIN HYDROCHLORIDE 1 G/20ML
INJECTION, POWDER, LYOPHILIZED, FOR SOLUTION INTRAVENOUS
Status: COMPLETED
Start: 2019-01-28 | End: 2019-01-28

## 2019-01-28 RX ORDER — SODIUM CHLORIDE 0.9 % (FLUSH) 0.9 %
3 SYRINGE (ML) INJECTION EVERY 12 HOURS SCHEDULED
Status: DISCONTINUED | OUTPATIENT
Start: 2019-01-28 | End: 2019-01-31 | Stop reason: HOSPADM

## 2019-01-28 RX ORDER — SODIUM CHLORIDE 0.9 % (FLUSH) 0.9 %
3-10 SYRINGE (ML) INJECTION AS NEEDED
Status: DISCONTINUED | OUTPATIENT
Start: 2019-01-28 | End: 2019-01-31 | Stop reason: HOSPADM

## 2019-01-28 RX ORDER — SODIUM CHLORIDE 0.9 % (FLUSH) 0.9 %
10 SYRINGE (ML) INJECTION AS NEEDED
Status: DISCONTINUED | OUTPATIENT
Start: 2019-01-28 | End: 2019-01-31 | Stop reason: HOSPADM

## 2019-01-28 RX ORDER — SODIUM CHLORIDE AND POTASSIUM CHLORIDE 150; 900 MG/100ML; MG/100ML
100 INJECTION, SOLUTION INTRAVENOUS CONTINUOUS
Status: DISCONTINUED | OUTPATIENT
Start: 2019-01-28 | End: 2019-01-29

## 2019-01-28 RX ORDER — IBUPROFEN 800 MG/1
800 TABLET ORAL EVERY 4 HOURS PRN
Status: DISCONTINUED | OUTPATIENT
Start: 2019-01-28 | End: 2019-01-28

## 2019-01-28 RX ORDER — SODIUM CHLORIDE AND POTASSIUM CHLORIDE 150; 900 MG/100ML; MG/100ML
INJECTION, SOLUTION INTRAVENOUS
Status: COMPLETED
Start: 2019-01-28 | End: 2019-01-28

## 2019-01-28 RX ADMIN — VANCOMYCIN HYDROCHLORIDE 2750 MG: 1 INJECTION, POWDER, LYOPHILIZED, FOR SOLUTION INTRAVENOUS at 20:42

## 2019-01-28 RX ADMIN — IBUPROFEN 800 MG: 800 TABLET, FILM COATED ORAL at 22:49

## 2019-01-28 RX ADMIN — VANCOMYCIN HYDROCHLORIDE: 1 INJECTION, POWDER, LYOPHILIZED, FOR SOLUTION INTRAVENOUS at 20:42

## 2019-01-28 RX ADMIN — SODIUM CHLORIDE AND POTASSIUM CHLORIDE 100 ML/HR: 9; 1.49 INJECTION, SOLUTION INTRAVENOUS at 22:57

## 2019-01-28 RX ADMIN — IBUPROFEN 800 MG: 800 TABLET, FILM COATED ORAL at 18:13

## 2019-01-28 RX ADMIN — VANCOMYCIN HYDROCHLORIDE 2750 MG: 1 INJECTION, POWDER, LYOPHILIZED, FOR SOLUTION INTRAVENOUS at 20:45

## 2019-01-28 RX ADMIN — SODIUM CHLORIDE AND POTASSIUM CHLORIDE 100 ML/HR: 150; 900 INJECTION, SOLUTION INTRAVENOUS at 22:57

## 2019-01-28 RX ADMIN — IBUPROFEN 800 MG: 400 TABLET ORAL at 18:13

## 2019-01-28 RX ADMIN — SODIUM CHLORIDE: 9 INJECTION, SOLUTION INTRAVENOUS at 20:00

## 2019-01-29 ENCOUNTER — APPOINTMENT (OUTPATIENT)
Dept: ONCOLOGY | Facility: CLINIC | Age: 29
End: 2019-01-29

## 2019-01-29 ENCOUNTER — APPOINTMENT (OUTPATIENT)
Dept: LAB | Facility: HOSPITAL | Age: 29
End: 2019-01-29

## 2019-01-29 DIAGNOSIS — C92.11 CHRONIC MYELOID LEUKEMIA, BCR/ABL-POSITIVE, IN REMISSION (HCC): Primary | ICD-10-CM

## 2019-01-29 LAB
ANION GAP SERPL CALCULATED.3IONS-SCNC: 13.1 MMOL/L
BASOPHILS # BLD AUTO: 0.03 10*3/MM3 (ref 0–0.2)
BASOPHILS NFR BLD AUTO: 0.2 % (ref 0–2)
BUN BLD-MCNC: 11 MG/DL (ref 6–20)
BUN/CREAT SERPL: 11 (ref 7–25)
CALCIUM SPEC-SCNC: 8.2 MG/DL (ref 8.6–10.5)
CHLORIDE SERPL-SCNC: 103 MMOL/L (ref 98–107)
CO2 SERPL-SCNC: 22.9 MMOL/L (ref 22–29)
CREAT BLD-MCNC: 1 MG/DL (ref 0.76–1.27)
DEPRECATED RDW RBC AUTO: 46.1 FL (ref 37–54)
EOSINOPHIL # BLD AUTO: 0.02 10*3/MM3 (ref 0.1–0.3)
EOSINOPHIL NFR BLD AUTO: 0.2 % (ref 0–4)
ERYTHROCYTE [DISTWIDTH] IN BLOOD BY AUTOMATED COUNT: 13 % (ref 11.5–14.5)
GFR SERPL CREATININE-BSD FRML MDRD: 88 ML/MIN/1.73
GLUCOSE BLD-MCNC: 104 MG/DL (ref 65–99)
HCT VFR BLD AUTO: 40.4 % (ref 42–52)
HGB BLD-MCNC: 13.6 G/DL (ref 14–18)
IMM GRANULOCYTES # BLD AUTO: 0.06 10*3/MM3 (ref 0–0.03)
IMM GRANULOCYTES NFR BLD AUTO: 0.5 % (ref 0–0.5)
LYMPHOCYTES # BLD AUTO: 1.35 10*3/MM3 (ref 0.6–4.8)
LYMPHOCYTES NFR BLD AUTO: 10.2 % (ref 20–45)
MCH RBC QN AUTO: 32.5 PG (ref 27–31)
MCHC RBC AUTO-ENTMCNC: 33.7 G/DL (ref 31–37)
MCV RBC AUTO: 96.4 FL (ref 80–94)
MONOCYTES # BLD AUTO: 1.26 10*3/MM3 (ref 0–1)
MONOCYTES NFR BLD AUTO: 9.5 % (ref 3–8)
NEUTROPHILS # BLD AUTO: 10.54 10*3/MM3 (ref 1.5–8.3)
NEUTROPHILS NFR BLD AUTO: 79.4 % (ref 45–70)
NRBC BLD AUTO-RTO: 0 /100 WBC (ref 0–0)
PLATELET # BLD AUTO: 150 10*3/MM3 (ref 140–500)
PMV BLD AUTO: 10.9 FL (ref 7.4–10.4)
POTASSIUM BLD-SCNC: 4.2 MMOL/L (ref 3.5–5.2)
RBC # BLD AUTO: 4.19 10*6/MM3 (ref 4.7–6.1)
SODIUM BLD-SCNC: 139 MMOL/L (ref 136–145)
WBC NRBC COR # BLD: 13.26 10*3/MM3 (ref 4.8–10.8)

## 2019-01-29 PROCEDURE — 99254 IP/OBS CNSLTJ NEW/EST MOD 60: CPT | Performed by: INTERNAL MEDICINE

## 2019-01-29 PROCEDURE — 85025 COMPLETE CBC W/AUTO DIFF WBC: CPT | Performed by: HOSPITALIST

## 2019-01-29 PROCEDURE — 80048 BASIC METABOLIC PNL TOTAL CA: CPT | Performed by: HOSPITALIST

## 2019-01-29 PROCEDURE — 99232 SBSQ HOSP IP/OBS MODERATE 35: CPT | Performed by: HOSPITALIST

## 2019-01-29 PROCEDURE — 25010000002 PIPERACILLIN SOD-TAZOBACTAM PER 1 G

## 2019-01-29 PROCEDURE — 25010000002 PIPERACILLIN SOD-TAZOBACTAM PER 1 G: Performed by: HOSPITALIST

## 2019-01-29 RX ORDER — PIPERACILLIN SODIUM, TAZOBACTAM SODIUM 3; .375 G/15ML; G/15ML
INJECTION, POWDER, LYOPHILIZED, FOR SOLUTION INTRAVENOUS
Status: COMPLETED
Start: 2019-01-29 | End: 2019-01-29

## 2019-01-29 RX ORDER — HYDROCODONE BITARTRATE AND ACETAMINOPHEN 5; 325 MG/1; MG/1
TABLET ORAL
Status: COMPLETED
Start: 2019-01-29 | End: 2019-01-29

## 2019-01-29 RX ORDER — HYDROCODONE BITARTRATE AND ACETAMINOPHEN 5; 325 MG/1; MG/1
1 TABLET ORAL EVERY 6 HOURS PRN
Status: DISCONTINUED | OUTPATIENT
Start: 2019-01-29 | End: 2019-01-29

## 2019-01-29 RX ORDER — IBUPROFEN 800 MG/1
800 TABLET ORAL EVERY 8 HOURS SCHEDULED
Status: DISCONTINUED | OUTPATIENT
Start: 2019-01-29 | End: 2019-01-31 | Stop reason: HOSPADM

## 2019-01-29 RX ADMIN — IBUPROFEN 800 MG: 800 TABLET ORAL at 09:27

## 2019-01-29 RX ADMIN — HYDROCODONE BITARTRATE AND ACETAMINOPHEN 1 TABLET: 5; 325 TABLET ORAL at 04:00

## 2019-01-29 RX ADMIN — PIPERACILLIN SODIUM,TAZOBACTAM SODIUM 3.38 G: 3; .375 INJECTION, POWDER, FOR SOLUTION INTRAVENOUS at 12:17

## 2019-01-29 RX ADMIN — PIPERACILLIN SODIUM,TAZOBACTAM SODIUM 3375 MG: 3; .375 INJECTION, POWDER, FOR SOLUTION INTRAVENOUS at 03:08

## 2019-01-29 RX ADMIN — SODIUM CHLORIDE, PRESERVATIVE FREE 3 ML: 5 INJECTION INTRAVENOUS at 21:19

## 2019-01-29 RX ADMIN — IBUPROFEN 800 MG: 800 TABLET ORAL at 18:29

## 2019-01-29 RX ADMIN — PIPERACILLIN SODIUM,TAZOBACTAM SODIUM 3.38 G: 3; .375 INJECTION, POWDER, FOR SOLUTION INTRAVENOUS at 18:26

## 2019-01-29 RX ADMIN — HYDROCODONE BITARTRATE AND ACETAMINOPHEN 1 TABLET: 5; 325 TABLET ORAL at 12:17

## 2019-01-30 ENCOUNTER — APPOINTMENT (OUTPATIENT)
Dept: CT IMAGING | Facility: HOSPITAL | Age: 29
End: 2019-01-30
Attending: NURSE PRACTITIONER

## 2019-01-30 LAB
BASOPHILS # BLD AUTO: 0.04 10*3/MM3 (ref 0–0.2)
BASOPHILS NFR BLD AUTO: 0.4 % (ref 0–2)
DEPRECATED RDW RBC AUTO: 47.2 FL (ref 37–54)
EOSINOPHIL # BLD AUTO: 0.3 10*3/MM3 (ref 0.1–0.3)
EOSINOPHIL NFR BLD AUTO: 2.9 % (ref 0–4)
ERYTHROCYTE [DISTWIDTH] IN BLOOD BY AUTOMATED COUNT: 13.2 % (ref 11.5–14.5)
HCT VFR BLD AUTO: 41.4 % (ref 42–52)
HGB BLD-MCNC: 13.6 G/DL (ref 14–18)
IMM GRANULOCYTES # BLD AUTO: 0.07 10*3/MM3 (ref 0–0.03)
IMM GRANULOCYTES NFR BLD AUTO: 0.7 % (ref 0–0.5)
LYMPHOCYTES # BLD AUTO: 1.88 10*3/MM3 (ref 0.6–4.8)
LYMPHOCYTES NFR BLD AUTO: 18.4 % (ref 20–45)
MCH RBC QN AUTO: 32.2 PG (ref 27–31)
MCHC RBC AUTO-ENTMCNC: 32.9 G/DL (ref 31–37)
MCV RBC AUTO: 97.9 FL (ref 80–94)
MONOCYTES # BLD AUTO: 1.67 10*3/MM3 (ref 0–1)
MONOCYTES NFR BLD AUTO: 16.3 % (ref 3–8)
NEUTROPHILS # BLD AUTO: 6.28 10*3/MM3 (ref 1.5–8.3)
NEUTROPHILS NFR BLD AUTO: 61.3 % (ref 45–70)
NRBC BLD AUTO-RTO: 0 /100 WBC (ref 0–0)
PLATELET # BLD AUTO: 141 10*3/MM3 (ref 140–500)
PMV BLD AUTO: 11.1 FL (ref 7.4–10.4)
RBC # BLD AUTO: 4.23 10*6/MM3 (ref 4.7–6.1)
WBC NRBC COR # BLD: 10.24 10*3/MM3 (ref 4.8–10.8)

## 2019-01-30 PROCEDURE — 0 IOPAMIDOL PER 1 ML: Performed by: HOSPITALIST

## 2019-01-30 PROCEDURE — 70487 CT MAXILLOFACIAL W/DYE: CPT

## 2019-01-30 PROCEDURE — 25010000002 PIPERACILLIN SOD-TAZOBACTAM PER 1 G: Performed by: HOSPITALIST

## 2019-01-30 PROCEDURE — 99232 SBSQ HOSP IP/OBS MODERATE 35: CPT | Performed by: NURSE PRACTITIONER

## 2019-01-30 PROCEDURE — 85025 COMPLETE CBC W/AUTO DIFF WBC: CPT | Performed by: HOSPITALIST

## 2019-01-30 RX ADMIN — IBUPROFEN 800 MG: 800 TABLET ORAL at 01:43

## 2019-01-30 RX ADMIN — SODIUM CHLORIDE, PRESERVATIVE FREE 3 ML: 5 INJECTION INTRAVENOUS at 10:26

## 2019-01-30 RX ADMIN — PIPERACILLIN SODIUM,TAZOBACTAM SODIUM 3.38 G: 3; .375 INJECTION, POWDER, FOR SOLUTION INTRAVENOUS at 10:24

## 2019-01-30 RX ADMIN — IOPAMIDOL 100 ML: 755 INJECTION, SOLUTION INTRAVENOUS at 11:15

## 2019-01-30 RX ADMIN — PIPERACILLIN SODIUM,TAZOBACTAM SODIUM 3.38 G: 3; .375 INJECTION, POWDER, FOR SOLUTION INTRAVENOUS at 19:10

## 2019-01-30 RX ADMIN — PIPERACILLIN SODIUM,TAZOBACTAM SODIUM 3.38 G: 3; .375 INJECTION, POWDER, FOR SOLUTION INTRAVENOUS at 03:31

## 2019-01-30 RX ADMIN — IBUPROFEN 800 MG: 800 TABLET ORAL at 10:26

## 2019-01-30 RX ADMIN — SODIUM CHLORIDE, PRESERVATIVE FREE 3 ML: 5 INJECTION INTRAVENOUS at 19:50

## 2019-01-30 RX ADMIN — IBUPROFEN 800 MG: 800 TABLET ORAL at 17:31

## 2019-01-31 VITALS
WEIGHT: 315 LBS | BODY MASS INDEX: 36.45 KG/M2 | DIASTOLIC BLOOD PRESSURE: 80 MMHG | RESPIRATION RATE: 16 BRPM | TEMPERATURE: 97.1 F | HEART RATE: 80 BPM | HEIGHT: 78 IN | SYSTOLIC BLOOD PRESSURE: 144 MMHG | OXYGEN SATURATION: 99 %

## 2019-01-31 PROCEDURE — 25010000002 PIPERACILLIN SOD-TAZOBACTAM PER 1 G: Performed by: HOSPITALIST

## 2019-01-31 PROCEDURE — 99238 HOSP IP/OBS DSCHRG MGMT 30/<: CPT | Performed by: NURSE PRACTITIONER

## 2019-01-31 RX ORDER — IBUPROFEN 800 MG/1
800 TABLET ORAL EVERY 8 HOURS SCHEDULED
Qty: 30 TABLET | Refills: 0 | Status: SHIPPED | OUTPATIENT
Start: 2019-01-31 | End: 2019-03-17

## 2019-01-31 RX ORDER — LEVOFLOXACIN 750 MG/1
750 TABLET ORAL DAILY
Qty: 10 TABLET | Refills: 0 | Status: SHIPPED | OUTPATIENT
Start: 2019-01-31 | End: 2019-02-10

## 2019-01-31 RX ORDER — OMEPRAZOLE 40 MG/1
40 CAPSULE, DELAYED RELEASE ORAL DAILY
Qty: 14 CAPSULE | Refills: 0 | Status: SHIPPED | OUTPATIENT
Start: 2019-01-31 | End: 2019-02-14

## 2019-01-31 RX ADMIN — IBUPROFEN 800 MG: 800 TABLET ORAL at 10:03

## 2019-01-31 RX ADMIN — PIPERACILLIN SODIUM,TAZOBACTAM SODIUM 3.38 G: 3; .375 INJECTION, POWDER, FOR SOLUTION INTRAVENOUS at 02:24

## 2019-01-31 RX ADMIN — IBUPROFEN 800 MG: 800 TABLET ORAL at 02:24

## 2019-02-01 ENCOUNTER — READMISSION MANAGEMENT (OUTPATIENT)
Dept: CALL CENTER | Facility: HOSPITAL | Age: 29
End: 2019-02-01

## 2019-02-02 LAB
BACTERIA SPEC AEROBE CULT: NORMAL
BACTERIA SPEC AEROBE CULT: NORMAL

## 2019-02-02 NOTE — OUTREACH NOTE
Prep Survey      Responses   Facility patient discharged from?  [facility]   Is LACE score < 7 ?  No   Is patient eligible?  Yes   Discharge diagnosis  sepsis d/t facial/ear cellulitis   Does the patient have one of the following disease processes/diagnoses(primary or secondary)?  Sepsis   Does the patient have Home health ordered?  No   Is there a DME ordered?  No   Prep survey completed?  Yes          Kandy Silva RN

## 2019-02-04 ENCOUNTER — READMISSION MANAGEMENT (OUTPATIENT)
Dept: CALL CENTER | Facility: HOSPITAL | Age: 29
End: 2019-02-04

## 2019-02-04 NOTE — OUTREACH NOTE
Sepsis Week 1 Survey      Responses   Facility patient discharged from?  [facility]   Does the patient have one of the following disease processes/diagnoses(primary or secondary)?  Sepsis   Is there a successful TCM telephone encounter documented?  No   Week 1 attempt successful?  Yes   Call start time  1529   Call end time  1535   Discharge diagnosis  sepsis d/t facial/ear cellulitis   Is patient permission given to speak with other caregiver?  No   Meds reviewed with patient/caregiver?  Yes   Is the patient having any side effects they believe may be caused by any medication additions or changes?  No   Does the patient have all medications related to this admission filled (includes all antibiotics, inhalers, nebulizers,steroids,etc.)  Yes   Is the patient taking all medications as directed (includes completed medication regime)?  Yes   Does the patient have a primary care provider?   Yes   Comments regarding PCP  Dr Washington/ PCP- Has a followup this Thursday. 02/07/2019   Does the patient have an appointment with their PCP within 7 days of discharge?  Yes   Has the patient kept scheduled appointments due by today?  N/A   Has home health visited the patient within 72 hours of discharge?  N/A   Psychosocial issues?  No   Did the patient receive a copy of their discharge instructions?  Yes   Nursing interventions  Reviewed instructions with patient   What is the patient's perception of their health status since discharge?  Improving   Nursing interventions  Nurse provided patient education   Is the patient/caregiver able to teach back Sepsis?  S - Shivering,fever or very cold, E - Extreme pain or generalized discomfort (worst ever,especially abdomen), P - Pale or discolored skin, S - Sleepy, difficult to arouse,confused, I -   I feel like I might die-a feeling of hopelessness, S - Short of breath   Nursing interventions  Nurse provided reassurance to patient, Nurse provided patient education   Is patient/caregiver able to  teach back steps to recovery at home?  Set small, achievable goals for return to baseline health, Rest and regain strength, Make a list of questions for PCP appoinment   Is the patient/caregiver able to teach back signs and symptoms of worsening condition:  Fever, Hyperthermia, Rapid heart rate (>90), Shortness of breath/rapid respiratory rate, Altered mental status(confusion/coma), Edema   Is the patient/caregiver able to teach back the hierarchy of who to call/visit for symptoms/problems? PCP, Specialist, Home health nurse, Urgent Care, ED, 911  Yes   Additional teach back comments  Patient is afebrile, has no chills, SOB, or chest pain.    Week 1 call completed?  Yes          Lenin Ladd RN

## 2019-02-11 ENCOUNTER — READMISSION MANAGEMENT (OUTPATIENT)
Dept: CALL CENTER | Facility: HOSPITAL | Age: 29
End: 2019-02-11

## 2019-02-11 NOTE — OUTREACH NOTE
Sepsis Week 2 Survey      Responses   Facility patient discharged from?  [facility]   Does the patient have one of the following disease processes/diagnoses(primary or secondary)?  Sepsis   Week 2 attempt successful?  No   Unsuccessful attempts  Attempt 1          Lenin Ladd RN

## 2019-02-13 ENCOUNTER — READMISSION MANAGEMENT (OUTPATIENT)
Dept: CALL CENTER | Facility: HOSPITAL | Age: 29
End: 2019-02-13

## 2019-02-13 NOTE — OUTREACH NOTE
Sepsis Week 2 Survey      Responses   Facility patient discharged from?  [facility]   Does the patient have one of the following disease processes/diagnoses(primary or secondary)?  Sepsis   Week 2 attempt successful?  Yes   Call start time  1152   Call end time  1155   Discharge diagnosis  sepsis d/t facial/ear cellulitis   Meds reviewed with patient/caregiver?  Yes   Is the patient having any side effects they believe may be caused by any medication additions or changes?  No   Does the patient have all medications related to this admission filled (includes all antibiotics, inhalers, nebulizers,steroids,etc.)  Yes   Is the patient taking all medications as directed (includes completed medication regime)?  Yes   Does the patient have a primary care provider?   Yes   Does the patient have an appointment with their PCP within 7 days of discharge?  Yes   Has the patient kept scheduled appointments due by today?  Yes   Has home health visited the patient within 72 hours of discharge?  N/A   Psychosocial issues?  No   Did the patient receive a copy of their discharge instructions?  Yes   Nursing interventions  Reviewed instructions with patient   What is the patient's perception of their health status since discharge?  Improving   Nursing interventions  Nurse provided patient education   Is the patient/caregiver able to teach back Sepsis?  S - Shivering,fever or very cold, E - Extreme pain or generalized discomfort (worst ever,especially abdomen), P - Pale or discolored skin, S - Sleepy, difficult to arouse,confused, I -   I feel like I might die-a feeling of hopelessness, S - Short of breath   Nursing interventions  Nurse provided reassurance to patient, Nurse provided patient education   Is the patient/caregiver able to teach back signs and symptoms of worsening condition:  Fever, Hyperthermia, Rapid heart rate (>90), Shortness of breath/rapid respiratory rate, Altered mental status(confusion/coma)   Is the  patient/caregiver able to teach back the hierarchy of who to call/visit for symptoms/problems? PCP, Specialist, Home health nurse, Urgent Care, ED, 911  Yes   Additional teach back comments  pt free of S and S of sepsis   Week 2 call completed?  Yes   Revoked  No further contact(revokes)-requires comment   Graduated/Revoked comments  pt states meeting all goals, finished antibiotics, no more calls necessary          Citlalli So RN

## 2019-03-17 ENCOUNTER — HOSPITAL ENCOUNTER (EMERGENCY)
Facility: HOSPITAL | Age: 29
Discharge: ANOTHER HOSPITAL | End: 2019-03-17
Attending: EMERGENCY MEDICINE | Admitting: EMERGENCY MEDICINE

## 2019-03-17 VITALS
DIASTOLIC BLOOD PRESSURE: 76 MMHG | RESPIRATION RATE: 16 BRPM | HEIGHT: 78 IN | HEART RATE: 71 BPM | WEIGHT: 315 LBS | SYSTOLIC BLOOD PRESSURE: 136 MMHG | OXYGEN SATURATION: 97 % | TEMPERATURE: 98.2 F | BODY MASS INDEX: 36.45 KG/M2

## 2019-03-17 DIAGNOSIS — M54.40 BACK PAIN OF LUMBAR REGION WITH SCIATICA: Primary | ICD-10-CM

## 2019-03-17 DIAGNOSIS — R11.2 NAUSEA AND VOMITING, INTRACTABILITY OF VOMITING NOT SPECIFIED, UNSPECIFIED VOMITING TYPE: ICD-10-CM

## 2019-03-17 LAB
ALBUMIN SERPL-MCNC: 4.6 G/DL (ref 3.5–5.2)
ALBUMIN/GLOB SERPL: 1.6 G/DL
ALP SERPL-CCNC: 69 U/L (ref 39–117)
ALT SERPL W P-5'-P-CCNC: 18 U/L (ref 1–41)
ANION GAP SERPL CALCULATED.3IONS-SCNC: 7.6 MMOL/L
AST SERPL-CCNC: 20 U/L (ref 1–40)
BASOPHILS # BLD AUTO: 0.02 10*3/MM3 (ref 0–0.2)
BASOPHILS NFR BLD AUTO: 0.2 % (ref 0–1.5)
BILIRUB SERPL-MCNC: 0.3 MG/DL (ref 0.1–1.2)
BUN BLD-MCNC: 13 MG/DL (ref 6–20)
BUN/CREAT SERPL: 15.3 (ref 7–25)
CALCIUM SPEC-SCNC: 9.5 MG/DL (ref 8.6–10.5)
CHLORIDE SERPL-SCNC: 105 MMOL/L (ref 98–107)
CO2 SERPL-SCNC: 29.4 MMOL/L (ref 22–29)
CREAT BLD-MCNC: 0.85 MG/DL (ref 0.76–1.27)
DEPRECATED RDW RBC AUTO: 47.5 FL (ref 37–54)
EOSINOPHIL # BLD AUTO: 0.06 10*3/MM3 (ref 0–0.4)
EOSINOPHIL NFR BLD AUTO: 0.5 % (ref 0.3–6.2)
ERYTHROCYTE [DISTWIDTH] IN BLOOD BY AUTOMATED COUNT: 13.2 % (ref 12.3–15.4)
GFR SERPL CREATININE-BSD FRML MDRD: 106 ML/MIN/1.73
GLOBULIN UR ELPH-MCNC: 2.8 GM/DL
GLUCOSE BLD-MCNC: 112 MG/DL (ref 65–99)
HCT VFR BLD AUTO: 43.9 % (ref 37.5–51)
HGB BLD-MCNC: 14 G/DL (ref 13–17.7)
IMM GRANULOCYTES # BLD AUTO: 0.04 10*3/MM3 (ref 0–0.05)
IMM GRANULOCYTES NFR BLD AUTO: 0.3 % (ref 0–0.5)
LYMPHOCYTES # BLD AUTO: 1.36 10*3/MM3 (ref 0.7–3.1)
LYMPHOCYTES NFR BLD AUTO: 11.3 % (ref 19.6–45.3)
MCH RBC QN AUTO: 31.5 PG (ref 26.6–33)
MCHC RBC AUTO-ENTMCNC: 31.9 G/DL (ref 31.5–35.7)
MCV RBC AUTO: 98.7 FL (ref 79–97)
MONOCYTES # BLD AUTO: 0.78 10*3/MM3 (ref 0.1–0.9)
MONOCYTES NFR BLD AUTO: 6.5 % (ref 5–12)
NEUTROPHILS # BLD AUTO: 9.82 10*3/MM3 (ref 1.4–7)
NEUTROPHILS NFR BLD AUTO: 81.2 % (ref 42.7–76)
NRBC BLD AUTO-RTO: 0 /100 WBC (ref 0–0)
PLATELET # BLD AUTO: 301 10*3/MM3 (ref 140–450)
PMV BLD AUTO: 10.6 FL (ref 6–12)
POTASSIUM BLD-SCNC: 4.3 MMOL/L (ref 3.5–5.2)
PROT SERPL-MCNC: 7.4 G/DL (ref 6–8.5)
RBC # BLD AUTO: 4.45 10*6/MM3 (ref 4.14–5.8)
SODIUM BLD-SCNC: 142 MMOL/L (ref 136–145)
WBC NRBC COR # BLD: 12.08 10*3/MM3 (ref 3.4–10.8)

## 2019-03-17 PROCEDURE — 96374 THER/PROPH/DIAG INJ IV PUSH: CPT

## 2019-03-17 PROCEDURE — 80053 COMPREHEN METABOLIC PANEL: CPT | Performed by: EMERGENCY MEDICINE

## 2019-03-17 PROCEDURE — 25010000002 ONDANSETRON PER 1 MG: Performed by: EMERGENCY MEDICINE

## 2019-03-17 PROCEDURE — 25010000002 HYDROMORPHONE PER 4 MG: Performed by: EMERGENCY MEDICINE

## 2019-03-17 PROCEDURE — 99284 EMERGENCY DEPT VISIT MOD MDM: CPT | Performed by: EMERGENCY MEDICINE

## 2019-03-17 PROCEDURE — 99283 EMERGENCY DEPT VISIT LOW MDM: CPT

## 2019-03-17 PROCEDURE — 96361 HYDRATE IV INFUSION ADD-ON: CPT

## 2019-03-17 PROCEDURE — 96375 TX/PRO/DX INJ NEW DRUG ADDON: CPT

## 2019-03-17 PROCEDURE — 85025 COMPLETE CBC W/AUTO DIFF WBC: CPT | Performed by: EMERGENCY MEDICINE

## 2019-03-17 RX ORDER — SODIUM CHLORIDE 0.9 % (FLUSH) 0.9 %
10 SYRINGE (ML) INJECTION AS NEEDED
Status: DISCONTINUED | OUTPATIENT
Start: 2019-03-17 | End: 2019-03-17 | Stop reason: HOSPADM

## 2019-03-17 RX ORDER — CETIRIZINE HYDROCHLORIDE 10 MG/1
10 TABLET ORAL
COMMUNITY

## 2019-03-17 RX ORDER — HYDROCODONE BITARTRATE AND ACETAMINOPHEN 5; 325 MG/1; MG/1
1 TABLET ORAL EVERY 6 HOURS PRN
COMMUNITY
End: 2019-04-25 | Stop reason: ALTCHOICE

## 2019-03-17 RX ORDER — ONDANSETRON 2 MG/ML
4 INJECTION INTRAMUSCULAR; INTRAVENOUS ONCE
Status: COMPLETED | OUTPATIENT
Start: 2019-03-17 | End: 2019-03-17

## 2019-03-17 RX ORDER — HYDROMORPHONE HCL 110MG/55ML
0.5 PATIENT CONTROLLED ANALGESIA SYRINGE INTRAVENOUS ONCE
Status: COMPLETED | OUTPATIENT
Start: 2019-03-17 | End: 2019-03-17

## 2019-03-17 RX ADMIN — ONDANSETRON 4 MG: 2 INJECTION, SOLUTION INTRAMUSCULAR; INTRAVENOUS at 09:59

## 2019-03-17 RX ADMIN — HYDROMORPHONE HYDROCHLORIDE 0.5 MG: 2 INJECTION, SOLUTION INTRAMUSCULAR; INTRAVENOUS; SUBCUTANEOUS at 10:04

## 2019-03-17 RX ADMIN — SODIUM CHLORIDE, POTASSIUM CHLORIDE, SODIUM LACTATE AND CALCIUM CHLORIDE 1000 ML: 600; 310; 30; 20 INJECTION, SOLUTION INTRAVENOUS at 09:59

## 2019-03-17 NOTE — ED NOTES
Pt vomits approx 100 ml of bile green emesis, pt color is pale and pt is diaphoretic.     Chris Gonzalez, KENTRELL  03/17/19 5337

## 2019-03-17 NOTE — ED NOTES
Spoke with Chey at [facility] - - [phone].  Requested records from procedure on Friday, 3/15.      Garcia Cha  03/17/19 0921

## 2019-03-17 NOTE — ED NOTES
Abd binder in place, lower mid back incision well approx with dressing and staples intact, ecchymosis noted. Scant amount of bloody drainage on dressing.       Chris oGnzalez, RN  03/17/19 1012

## 2019-03-17 NOTE — ED NOTES
Pt transferred to [facility] per [facility] protocol. IV site patent.  Pain 6/10     Chris Gonzalez, RN  03/17/19 1123       Chris Gonzalez RN  03/17/19 1129

## 2019-04-23 ENCOUNTER — HOSPITAL ENCOUNTER (OUTPATIENT)
Dept: OTHER | Facility: HOSPITAL | Age: 29
Discharge: HOME | End: 2019-04-23

## 2019-04-23 LAB — VANCOMYCIN TROUGH SERPL-MCNC: 15 UG/ML (ref 10–20)

## 2019-04-25 ENCOUNTER — APPOINTMENT (OUTPATIENT)
Dept: LAB | Facility: HOSPITAL | Age: 29
End: 2019-04-25

## 2019-04-25 ENCOUNTER — OFFICE VISIT (OUTPATIENT)
Dept: ONCOLOGY | Facility: CLINIC | Age: 29
End: 2019-04-25

## 2019-04-25 VITALS
DIASTOLIC BLOOD PRESSURE: 86 MMHG | BODY MASS INDEX: 42.7 KG/M2 | HEART RATE: 84 BPM | OXYGEN SATURATION: 96 % | SYSTOLIC BLOOD PRESSURE: 146 MMHG | TEMPERATURE: 98.1 F | WEIGHT: 315 LBS

## 2019-04-25 DIAGNOSIS — C92.11 CHRONIC MYELOID LEUKEMIA, BCR/ABL-POSITIVE, IN REMISSION (HCC): ICD-10-CM

## 2019-04-25 LAB
BASOPHILS # BLD AUTO: 0.05 10*3/MM3 (ref 0–0.2)
BASOPHILS NFR BLD AUTO: 0.8 % (ref 0–1.5)
DEPRECATED RDW RBC AUTO: 44.2 FL (ref 37–54)
EOSINOPHIL # BLD AUTO: 0.58 10*3/MM3 (ref 0–0.4)
EOSINOPHIL NFR BLD AUTO: 9.6 % (ref 0.3–6.2)
ERYTHROCYTE [DISTWIDTH] IN BLOOD BY AUTOMATED COUNT: 12.6 % (ref 12.3–15.4)
HCT VFR BLD AUTO: 41.5 % (ref 37.5–51)
HGB BLD-MCNC: 13.7 G/DL (ref 13–17.7)
IMM GRANULOCYTES # BLD AUTO: 0.01 10*3/MM3 (ref 0–0.05)
IMM GRANULOCYTES NFR BLD AUTO: 0.2 % (ref 0–0.5)
LYMPHOCYTES # BLD AUTO: 1.56 10*3/MM3 (ref 0.7–3.1)
LYMPHOCYTES NFR BLD AUTO: 25.8 % (ref 19.6–45.3)
MCH RBC QN AUTO: 31.2 PG (ref 26.6–33)
MCHC RBC AUTO-ENTMCNC: 33 G/DL (ref 31.5–35.7)
MCV RBC AUTO: 94.5 FL (ref 79–97)
MONOCYTES # BLD AUTO: 0.62 10*3/MM3 (ref 0.1–0.9)
MONOCYTES NFR BLD AUTO: 10.3 % (ref 5–12)
NEUTROPHILS # BLD AUTO: 3.22 10*3/MM3 (ref 1.7–7)
NEUTROPHILS NFR BLD AUTO: 53.3 % (ref 42.7–76)
PLATELET # BLD AUTO: 227 10*3/MM3 (ref 140–450)
PMV BLD AUTO: 11 FL (ref 6–12)
RBC # BLD AUTO: 4.39 10*6/MM3 (ref 4.14–5.8)
WBC NRBC COR # BLD: 6.04 10*3/MM3 (ref 3.4–10.8)

## 2019-04-25 PROCEDURE — 36415 COLL VENOUS BLD VENIPUNCTURE: CPT | Performed by: NURSE PRACTITIONER

## 2019-04-25 PROCEDURE — 85025 COMPLETE CBC W/AUTO DIFF WBC: CPT | Performed by: NURSE PRACTITIONER

## 2019-04-25 PROCEDURE — 99213 OFFICE O/P EST LOW 20 MIN: CPT | Performed by: NURSE PRACTITIONER

## 2019-04-25 RX ORDER — HYDROMORPHONE HYDROCHLORIDE 10 MG/ML
INJECTION INTRAMUSCULAR; INTRAVENOUS; SUBCUTANEOUS
Refills: 0 | COMMUNITY
Start: 2019-02-18 | End: 2019-04-25 | Stop reason: SDUPTHER

## 2019-04-25 RX ORDER — PROMETHAZINE HYDROCHLORIDE 25 MG/1
SUPPOSITORY RECTAL
Refills: 0 | COMMUNITY
Start: 2019-03-20 | End: 2020-02-25
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
## 2019-04-26 ENCOUNTER — HOSPITAL ENCOUNTER (OUTPATIENT)
Dept: OTHER | Facility: HOSPITAL | Age: 29
Discharge: HOME | End: 2019-04-26

## 2019-04-26 LAB — VANCOMYCIN TROUGH SERPL-MCNC: 5 UG/ML (ref 10–20)

## 2019-05-04 ENCOUNTER — HOSPITAL ENCOUNTER (OUTPATIENT)
Dept: OTHER | Facility: HOSPITAL | Age: 29
Discharge: HOME | End: 2019-05-04

## 2019-05-04 LAB
ANION GAP SERPL CALC-SCNC: 27 MMOL/L (ref 8–19)
BASOPHILS # BLD AUTO: 0.08 10*3/UL (ref 0–0.2)
BASOPHILS NFR BLD AUTO: 0.7 % (ref 0–3)
BUN SERPL-MCNC: 16 MG/DL (ref 5–25)
BUN/CREAT SERPL: 16 {RATIO} (ref 6–20)
CALCIUM SERPL-MCNC: 9.6 MG/DL (ref 8.7–10.4)
CHLORIDE SERPL-SCNC: 100 MMOL/L (ref 99–111)
CONV ABS IMM GRAN: 0.06 10*3/UL (ref 0–0.2)
CONV CO2: 23 MMOL/L (ref 22–32)
CONV IMMATURE GRAN: 0.5 % (ref 0–1.8)
CREAT UR-MCNC: 1 MG/DL (ref 0.7–1.2)
DEPRECATED RDW RBC AUTO: 49.4 FL (ref 35.1–43.9)
EOSINOPHIL # BLD AUTO: 0.43 10*3/UL (ref 0–0.7)
EOSINOPHIL # BLD AUTO: 3.6 % (ref 0–7)
ERYTHROCYTE [DISTWIDTH] IN BLOOD BY AUTOMATED COUNT: 13.2 % (ref 11.6–14.4)
GFR SERPLBLD BASED ON 1.73 SQ M-ARVRAT: >60 ML/MIN/{1.73_M2}
GLUCOSE SERPL-MCNC: 33 MG/DL (ref 70–99)
HBA1C MFR BLD: 13.8 G/DL (ref 14–18)
HCT VFR BLD AUTO: 45.4 % (ref 42–52)
LYMPHOCYTES # BLD AUTO: 2.5 10*3/UL (ref 1–5)
MCH RBC QN AUTO: 30.7 PG (ref 27–31)
MCHC RBC AUTO-ENTMCNC: 30.4 G/DL (ref 33–37)
MCV RBC AUTO: 101.1 FL (ref 80–96)
MONOCYTES # BLD AUTO: 0.87 10*3/UL (ref 0.2–1.2)
MONOCYTES NFR BLD AUTO: 7.2 % (ref 3–10)
NEUTROPHILS # BLD AUTO: 8.16 10*3/UL (ref 2–8)
NEUTROPHILS NFR BLD AUTO: 67.3 % (ref 30–85)
NRBC CBCN: 0 % (ref 0–0.7)
OSMOLALITY SERPL CALC.SUM OF ELEC: 300 MOSM/KG (ref 273–304)
PLATELET # BLD AUTO: 297 10*3/UL (ref 130–400)
PMV BLD AUTO: 11.9 FL (ref 9.4–12.4)
POTASSIUM SERPL-SCNC: 3.5 MMOL/L (ref 3.5–5.3)
RBC # BLD AUTO: 4.49 10*6/UL (ref 4.7–6.1)
SODIUM SERPL-SCNC: 146 MMOL/L (ref 135–147)
VANCOMYCIN TROUGH SERPL-MCNC: 5 UG/ML (ref 10–20)
VARIANT LYMPHS NFR BLD MANUAL: 20.7 % (ref 20–45)
WBC # BLD AUTO: 12.1 10*3/UL (ref 4.8–10.8)

## 2019-07-22 ENCOUNTER — LAB (OUTPATIENT)
Dept: OTHER | Facility: HOSPITAL | Age: 29
End: 2019-07-22

## 2019-07-22 DIAGNOSIS — C92.11 CHRONIC MYELOID LEUKEMIA, BCR/ABL-POSITIVE, IN REMISSION (HCC): ICD-10-CM

## 2019-07-22 LAB
ALBUMIN SERPL-MCNC: 4.5 G/DL (ref 3.5–5.2)
ALBUMIN/GLOB SERPL: 1.7 G/DL
ALP SERPL-CCNC: 69 U/L (ref 39–117)
ALT SERPL W P-5'-P-CCNC: 20 U/L (ref 1–41)
ANION GAP SERPL CALCULATED.3IONS-SCNC: 11.1 MMOL/L (ref 5–15)
AST SERPL-CCNC: 25 U/L (ref 1–40)
BASOPHILS # BLD AUTO: 0.04 10*3/MM3 (ref 0–0.2)
BASOPHILS NFR BLD AUTO: 0.4 % (ref 0–1.5)
BILIRUB SERPL-MCNC: 0.2 MG/DL (ref 0.1–1.2)
BUN BLD-MCNC: 15 MG/DL (ref 6–20)
BUN/CREAT SERPL: 14.2 (ref 7–25)
CALCIUM SPEC-SCNC: 9.2 MG/DL (ref 8.6–10.5)
CHLORIDE SERPL-SCNC: 104 MMOL/L (ref 98–107)
CO2 SERPL-SCNC: 24.9 MMOL/L (ref 22–29)
CREAT BLD-MCNC: 1.06 MG/DL (ref 0.76–1.27)
DEPRECATED RDW RBC AUTO: 50.6 FL (ref 37–54)
EOSINOPHIL # BLD AUTO: 0.52 10*3/MM3 (ref 0–0.4)
EOSINOPHIL NFR BLD AUTO: 5.1 % (ref 0.3–6.2)
ERYTHROCYTE [DISTWIDTH] IN BLOOD BY AUTOMATED COUNT: 14.3 % (ref 12.3–15.4)
GFR SERPL CREATININE-BSD FRML MDRD: 81 ML/MIN/1.73
GLOBULIN UR ELPH-MCNC: 2.6 GM/DL
GLUCOSE BLD-MCNC: 112 MG/DL (ref 65–99)
HCT VFR BLD AUTO: 37.8 % (ref 37.5–51)
HGB BLD-MCNC: 12.3 G/DL (ref 13–17.7)
IMM GRANULOCYTES # BLD AUTO: 0.04 10*3/MM3 (ref 0–0.05)
IMM GRANULOCYTES NFR BLD AUTO: 0.4 % (ref 0–0.5)
LYMPHOCYTES # BLD AUTO: 3.63 10*3/MM3 (ref 0.7–3.1)
LYMPHOCYTES NFR BLD AUTO: 35.4 % (ref 19.6–45.3)
MCH RBC QN AUTO: 31.4 PG (ref 26.6–33)
MCHC RBC AUTO-ENTMCNC: 32.5 G/DL (ref 31.5–35.7)
MCV RBC AUTO: 96.4 FL (ref 79–97)
MONOCYTES # BLD AUTO: 0.71 10*3/MM3 (ref 0.1–0.9)
MONOCYTES NFR BLD AUTO: 6.9 % (ref 5–12)
NEUTROPHILS # BLD AUTO: 5.31 10*3/MM3 (ref 1.7–7)
NEUTROPHILS NFR BLD AUTO: 51.8 % (ref 42.7–76)
NRBC BLD AUTO-RTO: 0 /100 WBC (ref 0–0.2)
PLATELET # BLD AUTO: 241 10*3/MM3 (ref 140–450)
PMV BLD AUTO: 10.2 FL (ref 6–12)
POTASSIUM BLD-SCNC: 3.6 MMOL/L (ref 3.5–5.2)
PROT SERPL-MCNC: 7.1 G/DL (ref 6–8.5)
RBC # BLD AUTO: 3.92 10*6/MM3 (ref 4.14–5.8)
SODIUM BLD-SCNC: 140 MMOL/L (ref 136–145)
WBC NRBC COR # BLD: 10.25 10*3/MM3 (ref 3.4–10.8)

## 2019-07-22 PROCEDURE — 85025 COMPLETE CBC W/AUTO DIFF WBC: CPT | Performed by: NURSE PRACTITIONER

## 2019-07-22 PROCEDURE — 36415 COLL VENOUS BLD VENIPUNCTURE: CPT

## 2019-07-22 PROCEDURE — 80053 COMPREHEN METABOLIC PANEL: CPT | Performed by: NURSE PRACTITIONER

## 2019-07-23 ENCOUNTER — APPOINTMENT (OUTPATIENT)
Dept: LAB | Facility: HOSPITAL | Age: 29
End: 2019-07-23

## 2019-07-25 ENCOUNTER — HOSPITAL ENCOUNTER (EMERGENCY)
Facility: HOSPITAL | Age: 29
Discharge: HOME | End: 2019-07-25
Attending: EMERGENCY MEDICINE | Admitting: EMERGENCY MEDICINE

## 2019-07-25 VITALS
SYSTOLIC BLOOD PRESSURE: 158 MMHG | HEIGHT: 78 IN | DIASTOLIC BLOOD PRESSURE: 65 MMHG | OXYGEN SATURATION: 94 % | TEMPERATURE: 99.4 F | RESPIRATION RATE: 16 BRPM | WEIGHT: 315 LBS | HEART RATE: 85 BPM | BODY MASS INDEX: 36.45 KG/M2

## 2019-07-25 DIAGNOSIS — R60.0 BILATERAL EDEMA OF LOWER EXTREMITY: ICD-10-CM

## 2019-07-25 DIAGNOSIS — L03.119 CELLULITIS OF LOWER EXTREMITY, UNSPECIFIED LATERALITY: Primary | ICD-10-CM

## 2019-07-25 LAB
ALBUMIN SERPL-MCNC: 4 G/DL (ref 3.5–5.2)
ALBUMIN/GLOB SERPL: 1.5 G/DL
ALP SERPL-CCNC: 63 U/L (ref 39–117)
ALT SERPL W P-5'-P-CCNC: 17 U/L (ref 1–41)
ANION GAP SERPL CALCULATED.3IONS-SCNC: 13.2 MMOL/L (ref 5–15)
AST SERPL-CCNC: 21 U/L (ref 1–40)
BASOPHILS # BLD AUTO: 0.03 10*3/MM3 (ref 0–0.2)
BASOPHILS NFR BLD AUTO: 0.4 % (ref 0–1.5)
BILIRUB SERPL-MCNC: 0.3 MG/DL (ref 0.2–1.2)
BUN BLD-MCNC: 14 MG/DL (ref 6–20)
BUN/CREAT SERPL: 12.6 (ref 7–25)
CALCIUM SPEC-SCNC: 8.6 MG/DL (ref 8.6–10.5)
CHLORIDE SERPL-SCNC: 102 MMOL/L (ref 98–107)
CO2 SERPL-SCNC: 25.8 MMOL/L (ref 22–29)
CREAT BLD-MCNC: 1.11 MG/DL (ref 0.76–1.27)
DEPRECATED RDW RBC AUTO: 52.2 FL (ref 37–54)
EOSINOPHIL # BLD AUTO: 0.43 10*3/MM3 (ref 0–0.4)
EOSINOPHIL NFR BLD AUTO: 5.9 % (ref 0.3–6.2)
ERYTHROCYTE [DISTWIDTH] IN BLOOD BY AUTOMATED COUNT: 14.2 % (ref 12.3–15.4)
GFR SERPL CREATININE-BSD FRML MDRD: 77 ML/MIN/1.73
GLOBULIN UR ELPH-MCNC: 2.7 GM/DL
GLUCOSE BLD-MCNC: 96 MG/DL (ref 65–99)
HCT VFR BLD AUTO: 35.4 % (ref 37.5–51)
HGB BLD-MCNC: 11.3 G/DL (ref 13–17.7)
IMM GRANULOCYTES # BLD AUTO: 0.03 10*3/MM3 (ref 0–0.05)
IMM GRANULOCYTES NFR BLD AUTO: 0.4 % (ref 0–0.5)
LYMPHOCYTES # BLD AUTO: 2.16 10*3/MM3 (ref 0.7–3.1)
LYMPHOCYTES NFR BLD AUTO: 29.9 % (ref 19.6–45.3)
MCH RBC QN AUTO: 31.6 PG (ref 26.6–33)
MCHC RBC AUTO-ENTMCNC: 31.9 G/DL (ref 31.5–35.7)
MCV RBC AUTO: 98.9 FL (ref 79–97)
MONOCYTES # BLD AUTO: 0.83 10*3/MM3 (ref 0.1–0.9)
MONOCYTES NFR BLD AUTO: 11.5 % (ref 5–12)
NEUTROPHILS # BLD AUTO: 3.75 10*3/MM3 (ref 1.7–7)
NEUTROPHILS NFR BLD AUTO: 51.9 % (ref 42.7–76)
NRBC BLD AUTO-RTO: 0 /100 WBC (ref 0–0.2)
PLATELET # BLD AUTO: 220 10*3/MM3 (ref 140–450)
PMV BLD AUTO: 10.7 FL (ref 6–12)
POTASSIUM BLD-SCNC: 3.9 MMOL/L (ref 3.5–5.2)
PROT SERPL-MCNC: 6.7 G/DL (ref 6–8.5)
RBC # BLD AUTO: 3.58 10*6/MM3 (ref 4.14–5.8)
SODIUM BLD-SCNC: 141 MMOL/L (ref 136–145)
TSH SERPL DL<=0.05 MIU/L-ACNC: 3.13 MIU/ML (ref 0.27–4.2)
WBC NRBC COR # BLD: 7.23 10*3/MM3 (ref 3.4–10.8)

## 2019-07-25 PROCEDURE — 85025 COMPLETE CBC W/AUTO DIFF WBC: CPT | Performed by: EMERGENCY MEDICINE

## 2019-07-25 PROCEDURE — 99282 EMERGENCY DEPT VISIT SF MDM: CPT | Performed by: EMERGENCY MEDICINE

## 2019-07-25 PROCEDURE — 84443 ASSAY THYROID STIM HORMONE: CPT | Performed by: EMERGENCY MEDICINE

## 2019-07-25 PROCEDURE — 99283 EMERGENCY DEPT VISIT LOW MDM: CPT

## 2019-07-25 PROCEDURE — 80053 COMPREHEN METABOLIC PANEL: CPT | Performed by: EMERGENCY MEDICINE

## 2019-07-25 RX ORDER — CEPHALEXIN 500 MG/1
500 CAPSULE ORAL ONCE
Status: COMPLETED | OUTPATIENT
Start: 2019-07-25 | End: 2019-07-25

## 2019-07-25 RX ORDER — CEPHALEXIN 500 MG/1
CAPSULE ORAL
Qty: 40 CAPSULE | Refills: 0 | Status: SHIPPED | OUTPATIENT
Start: 2019-07-25 | End: 2020-01-16

## 2019-07-25 RX ORDER — SODIUM CHLORIDE 0.9 % (FLUSH) 0.9 %
10 SYRINGE (ML) INJECTION AS NEEDED
Status: DISCONTINUED | OUTPATIENT
Start: 2019-07-25 | End: 2019-07-26 | Stop reason: HOSPADM

## 2019-07-25 RX ORDER — FENOFIBRATE 145 MG/1
145 TABLET, COATED ORAL DAILY
COMMUNITY
End: 2020-02-25

## 2019-07-25 RX ORDER — FUROSEMIDE 20 MG/1
20 TABLET ORAL DAILY
Qty: 5 TABLET | Refills: 0 | Status: SHIPPED | OUTPATIENT
Start: 2019-07-25 | End: 2020-04-07 | Stop reason: ALTCHOICE

## 2019-07-25 RX ADMIN — CEPHALEXIN 500 MG: 500 CAPSULE ORAL at 22:38

## 2019-07-30 ENCOUNTER — OFFICE VISIT (OUTPATIENT)
Dept: ONCOLOGY | Facility: CLINIC | Age: 29
End: 2019-07-30

## 2019-07-30 ENCOUNTER — APPOINTMENT (OUTPATIENT)
Dept: LAB | Facility: HOSPITAL | Age: 29
End: 2019-07-30

## 2019-07-30 VITALS
HEART RATE: 78 BPM | SYSTOLIC BLOOD PRESSURE: 155 MMHG | RESPIRATION RATE: 12 BRPM | WEIGHT: 315 LBS | DIASTOLIC BLOOD PRESSURE: 77 MMHG | OXYGEN SATURATION: 99 % | HEIGHT: 78 IN | TEMPERATURE: 97.8 F | BODY MASS INDEX: 36.45 KG/M2

## 2019-07-30 DIAGNOSIS — C92.11 CHRONIC MYELOID LEUKEMIA, BCR/ABL-POSITIVE, IN REMISSION (HCC): Primary | ICD-10-CM

## 2019-07-30 PROCEDURE — G0463 HOSPITAL OUTPT CLINIC VISIT: HCPCS | Performed by: INTERNAL MEDICINE

## 2019-07-30 PROCEDURE — 99213 OFFICE O/P EST LOW 20 MIN: CPT | Performed by: INTERNAL MEDICINE
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
## 2019-08-06 LAB — REF LAB TEST METHOD: NORMAL

## 2019-08-27 RX ORDER — DASATINIB 100 MG/1
TABLET ORAL
Qty: 30 TABLET | Refills: 6 | Status: SHIPPED | OUTPATIENT
Start: 2019-08-27 | End: 2020-05-11 | Stop reason: SDUPTHER

## 2019-08-27 NOTE — TELEPHONE ENCOUNTER
Sprycel refill request rec electronically from [facility] (local). Per last office note from Dr Mcbride-Pt is to continue. Request approved.

## 2019-10-22 ENCOUNTER — CLINICAL SUPPORT (OUTPATIENT)
Dept: ONCOLOGY | Facility: HOSPITAL | Age: 29
End: 2019-10-22

## 2019-10-22 ENCOUNTER — LAB (OUTPATIENT)
Dept: LAB | Facility: HOSPITAL | Age: 29
End: 2019-10-22

## 2019-10-22 ENCOUNTER — APPOINTMENT (OUTPATIENT)
Dept: LAB | Facility: HOSPITAL | Age: 29
End: 2019-10-22

## 2019-10-22 ENCOUNTER — APPOINTMENT (OUTPATIENT)
Dept: ONCOLOGY | Facility: HOSPITAL | Age: 29
End: 2019-10-22

## 2019-10-22 DIAGNOSIS — C92.11 CHRONIC MYELOID LEUKEMIA, BCR/ABL-POSITIVE, IN REMISSION (HCC): ICD-10-CM

## 2019-10-22 LAB
BASOPHILS # BLD AUTO: 0.08 10*3/MM3 (ref 0–0.2)
BASOPHILS NFR BLD AUTO: 0.4 % (ref 0–1.5)
DEPRECATED RDW RBC AUTO: 43.8 FL (ref 37–54)
EOSINOPHIL # BLD AUTO: 0.23 10*3/MM3 (ref 0–0.4)
EOSINOPHIL NFR BLD AUTO: 1.3 % (ref 0.3–6.2)
ERYTHROCYTE [DISTWIDTH] IN BLOOD BY AUTOMATED COUNT: 12.6 % (ref 12.3–15.4)
HCT VFR BLD AUTO: 38.3 % (ref 37.5–51)
HGB BLD-MCNC: 12.7 G/DL (ref 13–17.7)
IMM GRANULOCYTES # BLD AUTO: 0.18 10*3/MM3 (ref 0–0.05)
IMM GRANULOCYTES NFR BLD AUTO: 1 % (ref 0–0.5)
LYMPHOCYTES # BLD AUTO: 3.55 10*3/MM3 (ref 0.7–3.1)
LYMPHOCYTES NFR BLD AUTO: 19.9 % (ref 19.6–45.3)
MCH RBC QN AUTO: 31.7 PG (ref 26.6–33)
MCHC RBC AUTO-ENTMCNC: 33.2 G/DL (ref 31.5–35.7)
MCV RBC AUTO: 95.5 FL (ref 79–97)
MONOCYTES # BLD AUTO: 1.15 10*3/MM3 (ref 0.1–0.9)
MONOCYTES NFR BLD AUTO: 6.4 % (ref 5–12)
NEUTROPHILS # BLD AUTO: 12.68 10*3/MM3 (ref 1.7–7)
NEUTROPHILS NFR BLD AUTO: 71 % (ref 42.7–76)
NRBC BLD AUTO-RTO: 0 /100 WBC (ref 0–0.2)
PLATELET # BLD AUTO: 243 10*3/MM3 (ref 140–450)
PMV BLD AUTO: 11.3 FL (ref 6–12)
RBC # BLD AUTO: 4.01 10*6/MM3 (ref 4.14–5.8)
WBC NRBC COR # BLD: 17.87 10*3/MM3 (ref 3.4–10.8)

## 2019-10-22 PROCEDURE — 85025 COMPLETE CBC W/AUTO DIFF WBC: CPT

## 2019-10-22 PROCEDURE — 36415 COLL VENOUS BLD VENIPUNCTURE: CPT

## 2019-10-22 NOTE — PROGRESS NOTES
Lab results reviewed with patient.  WBC elevated. Dr Mcbride aware.  Pt denies any acute illness recently.  Pt to return in 3-4 weeks to recheck cbc. Pt V/U

## 2019-11-19 ENCOUNTER — CLINICAL SUPPORT (OUTPATIENT)
Dept: ONCOLOGY | Facility: HOSPITAL | Age: 29
End: 2019-11-19

## 2019-11-19 ENCOUNTER — LAB (OUTPATIENT)
Dept: LAB | Facility: HOSPITAL | Age: 29
End: 2019-11-19

## 2019-11-19 DIAGNOSIS — C92.11 CHRONIC MYELOID LEUKEMIA, BCR/ABL-POSITIVE, IN REMISSION (HCC): ICD-10-CM

## 2019-11-19 DIAGNOSIS — C92.11 CHRONIC MYELOID LEUKEMIA, BCR/ABL-POSITIVE, IN REMISSION (HCC): Primary | ICD-10-CM

## 2019-11-19 LAB
BASOPHILS # BLD AUTO: 0.06 10*3/MM3 (ref 0–0.2)
BASOPHILS NFR BLD AUTO: 0.6 % (ref 0–1.5)
DEPRECATED RDW RBC AUTO: 47.7 FL (ref 37–54)
EOSINOPHIL # BLD AUTO: 0.67 10*3/MM3 (ref 0–0.4)
EOSINOPHIL NFR BLD AUTO: 7.2 % (ref 0.3–6.2)
ERYTHROCYTE [DISTWIDTH] IN BLOOD BY AUTOMATED COUNT: 13.2 % (ref 12.3–15.4)
HCT VFR BLD AUTO: 37.9 % (ref 37.5–51)
HGB BLD-MCNC: 12.6 G/DL (ref 13–17.7)
IMM GRANULOCYTES # BLD AUTO: 0.1 10*3/MM3 (ref 0–0.05)
IMM GRANULOCYTES NFR BLD AUTO: 1.1 % (ref 0–0.5)
LYMPHOCYTES # BLD AUTO: 2.36 10*3/MM3 (ref 0.7–3.1)
LYMPHOCYTES NFR BLD AUTO: 25.2 % (ref 19.6–45.3)
MCH RBC QN AUTO: 32.6 PG (ref 26.6–33)
MCHC RBC AUTO-ENTMCNC: 33.2 G/DL (ref 31.5–35.7)
MCV RBC AUTO: 97.9 FL (ref 79–97)
MONOCYTES # BLD AUTO: 0.96 10*3/MM3 (ref 0.1–0.9)
MONOCYTES NFR BLD AUTO: 10.3 % (ref 5–12)
NEUTROPHILS # BLD AUTO: 5.2 10*3/MM3 (ref 1.7–7)
NEUTROPHILS NFR BLD AUTO: 55.6 % (ref 42.7–76)
NRBC BLD AUTO-RTO: 0.6 /100 WBC (ref 0–0.2)
PLATELET # BLD AUTO: 217 10*3/MM3 (ref 140–450)
PMV BLD AUTO: 10.9 FL (ref 6–12)
RBC # BLD AUTO: 3.87 10*6/MM3 (ref 4.14–5.8)
WBC NRBC COR # BLD: 9.35 10*3/MM3 (ref 3.4–10.8)

## 2019-11-19 PROCEDURE — 36415 COLL VENOUS BLD VENIPUNCTURE: CPT

## 2019-11-19 PROCEDURE — 85025 COMPLETE CBC W/AUTO DIFF WBC: CPT

## 2019-11-19 NOTE — PROGRESS NOTES
CBC results with patient.  His elevated WBC came down to WNL today.  Pt reports feeling extremely tired all of the time. Dr Mcbride messaged to possibly check iron studies when he sees him in January.

## 2019-11-20 DIAGNOSIS — R53.83 FATIGUE, UNSPECIFIED TYPE: ICD-10-CM

## 2019-11-20 DIAGNOSIS — C92.11 CHRONIC MYELOID LEUKEMIA, BCR/ABL-POSITIVE, IN REMISSION (HCC): Primary | ICD-10-CM

## 2020-01-02 ENCOUNTER — LAB (OUTPATIENT)
Dept: LAB | Facility: HOSPITAL | Age: 30
End: 2020-01-02

## 2020-01-02 DIAGNOSIS — C92.11 CHRONIC MYELOID LEUKEMIA, BCR/ABL-POSITIVE, IN REMISSION (HCC): ICD-10-CM

## 2020-01-02 DIAGNOSIS — R53.83 FATIGUE, UNSPECIFIED TYPE: ICD-10-CM

## 2020-01-02 LAB
ALBUMIN SERPL-MCNC: 3.9 G/DL (ref 3.5–5.2)
ALBUMIN/GLOB SERPL: 1.3 G/DL
ALP SERPL-CCNC: 83 U/L (ref 39–117)
ALT SERPL W P-5'-P-CCNC: 26 U/L (ref 1–41)
ANION GAP SERPL CALCULATED.3IONS-SCNC: 12.6 MMOL/L (ref 5–15)
AST SERPL-CCNC: 23 U/L (ref 1–40)
BASOPHILS # BLD AUTO: 0.05 10*3/MM3 (ref 0–0.2)
BASOPHILS NFR BLD AUTO: 0.6 % (ref 0–1.5)
BILIRUB SERPL-MCNC: <0.2 MG/DL (ref 0.2–1.2)
BUN BLD-MCNC: 12 MG/DL (ref 6–20)
BUN/CREAT SERPL: 12.8 (ref 7–25)
CALCIUM SPEC-SCNC: 8.9 MG/DL (ref 8.6–10.5)
CHLORIDE SERPL-SCNC: 102 MMOL/L (ref 98–107)
CO2 SERPL-SCNC: 23.4 MMOL/L (ref 22–29)
CREAT BLD-MCNC: 0.94 MG/DL (ref 0.76–1.27)
DEPRECATED RDW RBC AUTO: 46.3 FL (ref 37–54)
EOSINOPHIL # BLD AUTO: 0.37 10*3/MM3 (ref 0–0.4)
EOSINOPHIL NFR BLD AUTO: 4.3 % (ref 0.3–6.2)
ERYTHROCYTE [DISTWIDTH] IN BLOOD BY AUTOMATED COUNT: 12.5 % (ref 12.3–15.4)
FERRITIN SERPL-MCNC: 189 NG/ML (ref 30–400)
GFR SERPL CREATININE-BSD FRML MDRD: 94 ML/MIN/1.73
GLOBULIN UR ELPH-MCNC: 3.1 GM/DL
GLUCOSE BLD-MCNC: 101 MG/DL (ref 65–99)
HCT VFR BLD AUTO: 42.8 % (ref 37.5–51)
HGB BLD-MCNC: 13.6 G/DL (ref 13–17.7)
IMM GRANULOCYTES # BLD AUTO: 0.08 10*3/MM3 (ref 0–0.05)
IMM GRANULOCYTES NFR BLD AUTO: 0.9 % (ref 0–0.5)
IRON 24H UR-MRATE: 43 MCG/DL (ref 59–158)
IRON SATN MFR SERPL: 20 % (ref 20–50)
LYMPHOCYTES # BLD AUTO: 3.17 10*3/MM3 (ref 0.7–3.1)
LYMPHOCYTES NFR BLD AUTO: 36.9 % (ref 19.6–45.3)
MCH RBC QN AUTO: 31.5 PG (ref 26.6–33)
MCHC RBC AUTO-ENTMCNC: 31.8 G/DL (ref 31.5–35.7)
MCV RBC AUTO: 99.1 FL (ref 79–97)
MONOCYTES # BLD AUTO: 0.66 10*3/MM3 (ref 0.1–0.9)
MONOCYTES NFR BLD AUTO: 7.7 % (ref 5–12)
NEUTROPHILS # BLD AUTO: 4.25 10*3/MM3 (ref 1.7–7)
NEUTROPHILS NFR BLD AUTO: 49.6 % (ref 42.7–76)
PLATELET # BLD AUTO: 258 10*3/MM3 (ref 140–450)
PMV BLD AUTO: 10.8 FL (ref 6–12)
POTASSIUM BLD-SCNC: 3.7 MMOL/L (ref 3.5–5.2)
PROT SERPL-MCNC: 7 G/DL (ref 6–8.5)
RBC # BLD AUTO: 4.32 10*6/MM3 (ref 4.14–5.8)
SODIUM BLD-SCNC: 138 MMOL/L (ref 136–145)
TIBC SERPL-MCNC: 211 MCG/DL (ref 298–536)
TSH SERPL DL<=0.05 MIU/L-ACNC: 1.95 UIU/ML (ref 0.27–4.2)
UIBC SERPL-MCNC: 168 MCG/DL (ref 112–346)
VIT B12 BLD-MCNC: 1927 PG/ML (ref 211–946)
WBC NRBC COR # BLD: 8.58 10*3/MM3 (ref 3.4–10.8)

## 2020-01-02 PROCEDURE — 82607 VITAMIN B-12: CPT

## 2020-01-02 PROCEDURE — 80053 COMPREHEN METABOLIC PANEL: CPT

## 2020-01-02 PROCEDURE — 85025 COMPLETE CBC W/AUTO DIFF WBC: CPT

## 2020-01-02 PROCEDURE — 83540 ASSAY OF IRON: CPT

## 2020-01-02 PROCEDURE — 84443 ASSAY THYROID STIM HORMONE: CPT

## 2020-01-02 PROCEDURE — 82728 ASSAY OF FERRITIN: CPT

## 2020-01-02 PROCEDURE — 83550 IRON BINDING TEST: CPT

## 2020-01-02 PROCEDURE — 36415 COLL VENOUS BLD VENIPUNCTURE: CPT

## 2020-01-07 ENCOUNTER — APPOINTMENT (OUTPATIENT)
Dept: LAB | Facility: HOSPITAL | Age: 30
End: 2020-01-07

## 2020-01-08 LAB — REF LAB TEST METHOD: NORMAL

## 2020-01-15 ENCOUNTER — APPOINTMENT (OUTPATIENT)
Dept: LAB | Facility: HOSPITAL | Age: 30
End: 2020-01-15

## 2020-01-16 ENCOUNTER — OFFICE VISIT (OUTPATIENT)
Dept: ONCOLOGY | Facility: CLINIC | Age: 30
End: 2020-01-16

## 2020-01-16 ENCOUNTER — APPOINTMENT (OUTPATIENT)
Dept: LAB | Facility: HOSPITAL | Age: 30
End: 2020-01-16

## 2020-01-16 VITALS
WEIGHT: 315 LBS | BODY MASS INDEX: 43.74 KG/M2 | TEMPERATURE: 98.6 F | HEART RATE: 77 BPM | OXYGEN SATURATION: 97 % | DIASTOLIC BLOOD PRESSURE: 83 MMHG | SYSTOLIC BLOOD PRESSURE: 165 MMHG

## 2020-01-16 DIAGNOSIS — C92.11 CHRONIC MYELOID LEUKEMIA, BCR/ABL-POSITIVE, IN REMISSION (HCC): Primary | ICD-10-CM

## 2020-01-16 PROCEDURE — 99213 OFFICE O/P EST LOW 20 MIN: CPT | Performed by: NURSE PRACTITIONER

## 2020-01-16 RX ORDER — FUROSEMIDE 40 MG/1
TABLET ORAL
COMMUNITY
Start: 2019-10-23 | End: 2020-01-16 | Stop reason: SDUPTHER
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
## 2020-01-18 ENCOUNTER — ANESTHESIA EVENT (OUTPATIENT)
Dept: PERIOP | Facility: HOSPITAL | Age: 30
End: 2020-01-18

## 2020-01-18 ENCOUNTER — ANESTHESIA (OUTPATIENT)
Dept: PERIOP | Facility: HOSPITAL | Age: 30
End: 2020-01-18

## 2020-01-18 ENCOUNTER — HOSPITAL ENCOUNTER (OUTPATIENT)
Facility: HOSPITAL | Age: 30
Discharge: HOME | End: 2020-01-19
Attending: EMERGENCY MEDICINE | Admitting: ORTHOPAEDIC SURGERY

## 2020-01-18 ENCOUNTER — APPOINTMENT (OUTPATIENT)
Dept: GENERAL RADIOLOGY | Facility: HOSPITAL | Age: 30
End: 2020-01-18

## 2020-01-18 DIAGNOSIS — S82.021A CLOSED DISPLACED LONGITUDINAL FRACTURE OF RIGHT PATELLA, INITIAL ENCOUNTER: ICD-10-CM

## 2020-01-18 DIAGNOSIS — S82.021B: Primary | ICD-10-CM

## 2020-01-18 LAB
ABO GROUP BLD: NORMAL
ABO GROUP BLD: NORMAL
ALBUMIN SERPL-MCNC: 4.2 G/DL (ref 3.5–5.2)
ALBUMIN/GLOB SERPL: 1.5 G/DL
ALP SERPL-CCNC: 84 U/L (ref 39–117)
ALT SERPL W P-5'-P-CCNC: 25 U/L (ref 1–41)
ANION GAP SERPL CALCULATED.3IONS-SCNC: 9.6 MMOL/L (ref 5–15)
AST SERPL-CCNC: 20 U/L (ref 1–40)
BASOPHILS # BLD AUTO: 0.08 10*3/MM3 (ref 0–0.2)
BASOPHILS NFR BLD AUTO: 0.5 % (ref 0–1.5)
BILIRUB SERPL-MCNC: <0.2 MG/DL (ref 0.2–1.2)
BLD GP AB SCN SERPL QL: NEGATIVE
BUN BLD-MCNC: 11 MG/DL (ref 6–20)
BUN/CREAT SERPL: 11.7 (ref 7–25)
CALCIUM SPEC-SCNC: 9.4 MG/DL (ref 8.6–10.5)
CHLORIDE SERPL-SCNC: 103 MMOL/L (ref 98–107)
CO2 SERPL-SCNC: 26.4 MMOL/L (ref 22–29)
CREAT BLD-MCNC: 0.94 MG/DL (ref 0.76–1.27)
DEPRECATED RDW RBC AUTO: 45.6 FL (ref 37–54)
EOSINOPHIL # BLD AUTO: 0.29 10*3/MM3 (ref 0–0.4)
EOSINOPHIL NFR BLD AUTO: 1.9 % (ref 0.3–6.2)
ERYTHROCYTE [DISTWIDTH] IN BLOOD BY AUTOMATED COUNT: 12.8 % (ref 12.3–15.4)
GFR SERPL CREATININE-BSD FRML MDRD: 94 ML/MIN/1.73
GLOBULIN UR ELPH-MCNC: 2.8 GM/DL
GLUCOSE BLD-MCNC: 96 MG/DL (ref 65–99)
HCT VFR BLD AUTO: 41.3 % (ref 37.5–51)
HGB BLD-MCNC: 13.4 G/DL (ref 13–17.7)
IMM GRANULOCYTES # BLD AUTO: 0.19 10*3/MM3 (ref 0–0.05)
IMM GRANULOCYTES NFR BLD AUTO: 1.2 % (ref 0–0.5)
INR PPP: 1.04 (ref 0.9–1.1)
LYMPHOCYTES # BLD AUTO: 2.46 10*3/MM3 (ref 0.7–3.1)
LYMPHOCYTES NFR BLD AUTO: 16.1 % (ref 19.6–45.3)
MCH RBC QN AUTO: 31.8 PG (ref 26.6–33)
MCHC RBC AUTO-ENTMCNC: 32.4 G/DL (ref 31.5–35.7)
MCV RBC AUTO: 97.9 FL (ref 79–97)
MONOCYTES # BLD AUTO: 1.07 10*3/MM3 (ref 0.1–0.9)
MONOCYTES NFR BLD AUTO: 7 % (ref 5–12)
NEUTROPHILS # BLD AUTO: 11.19 10*3/MM3 (ref 1.7–7)
NEUTROPHILS NFR BLD AUTO: 73.3 % (ref 42.7–76)
NRBC BLD AUTO-RTO: 0 /100 WBC (ref 0–0.2)
PLATELET # BLD AUTO: 302 10*3/MM3 (ref 140–450)
PMV BLD AUTO: 10.9 FL (ref 6–12)
POTASSIUM BLD-SCNC: 4.2 MMOL/L (ref 3.5–5.2)
PROT SERPL-MCNC: 7 G/DL (ref 6–8.5)
PROTHROMBIN TIME: 13.3 SECONDS (ref 12.1–15)
RBC # BLD AUTO: 4.22 10*6/MM3 (ref 4.14–5.8)
RH BLD: POSITIVE
RH BLD: POSITIVE
SODIUM BLD-SCNC: 139 MMOL/L (ref 136–145)
T&S EXPIRATION DATE: NORMAL
WBC NRBC COR # BLD: 15.28 10*3/MM3 (ref 3.4–10.8)

## 2020-01-18 PROCEDURE — 11012 DEB SKIN BONE AT FX SITE: CPT | Performed by: ORTHOPAEDIC SURGERY

## 2020-01-18 PROCEDURE — G0378 HOSPITAL OBSERVATION PER HR: HCPCS

## 2020-01-18 PROCEDURE — 25010000003 CEFAZOLIN SODIUM-DEXTROSE 2-3 GM-%(50ML) RECONSTITUTED SOLUTION: Performed by: PHYSICIAN ASSISTANT

## 2020-01-18 PROCEDURE — 73562 X-RAY EXAM OF KNEE 3: CPT

## 2020-01-18 PROCEDURE — 86900 BLOOD TYPING SEROLOGIC ABO: CPT

## 2020-01-18 PROCEDURE — L1830 KO IMMOB CANVAS LONG PRE OTS: HCPCS | Performed by: ORTHOPAEDIC SURGERY

## 2020-01-18 PROCEDURE — 86901 BLOOD TYPING SEROLOGIC RH(D): CPT | Performed by: ORTHOPAEDIC SURGERY

## 2020-01-18 PROCEDURE — 25010000002 ROPIVACAINE PER 1 MG: Performed by: NURSE ANESTHETIST, CERTIFIED REGISTERED

## 2020-01-18 PROCEDURE — C1713 ANCHOR/SCREW BN/BN,TIS/BN: HCPCS | Performed by: ORTHOPAEDIC SURGERY

## 2020-01-18 PROCEDURE — 94799 UNLISTED PULMONARY SVC/PX: CPT

## 2020-01-18 PROCEDURE — 25010000002 FENTANYL CITRATE (PF) 100 MCG/2ML SOLUTION: Performed by: NURSE ANESTHETIST, CERTIFIED REGISTERED

## 2020-01-18 PROCEDURE — 99284 EMERGENCY DEPT VISIT MOD MDM: CPT | Performed by: EMERGENCY MEDICINE

## 2020-01-18 PROCEDURE — 99242 OFF/OP CONSLTJ NEW/EST SF 20: CPT | Performed by: FAMILY MEDICINE

## 2020-01-18 PROCEDURE — 86850 RBC ANTIBODY SCREEN: CPT | Performed by: ORTHOPAEDIC SURGERY

## 2020-01-18 PROCEDURE — 27524 TREAT KNEECAP FRACTURE: CPT | Performed by: ORTHOPAEDIC SURGERY

## 2020-01-18 PROCEDURE — 73560 X-RAY EXAM OF KNEE 1 OR 2: CPT

## 2020-01-18 PROCEDURE — 99220 PR INITIAL OBSERVATION CARE/DAY 70 MINUTES: CPT | Performed by: ORTHOPAEDIC SURGERY

## 2020-01-18 PROCEDURE — 86900 BLOOD TYPING SEROLOGIC ABO: CPT | Performed by: ORTHOPAEDIC SURGERY

## 2020-01-18 PROCEDURE — 85610 PROTHROMBIN TIME: CPT | Performed by: ORTHOPAEDIC SURGERY

## 2020-01-18 PROCEDURE — 12001 RPR S/N/AX/GEN/TRNK 2.5CM/<: CPT | Performed by: ORTHOPAEDIC SURGERY

## 2020-01-18 PROCEDURE — 96365 THER/PROPH/DIAG IV INF INIT: CPT

## 2020-01-18 PROCEDURE — 85025 COMPLETE CBC W/AUTO DIFF WBC: CPT | Performed by: PHYSICIAN ASSISTANT

## 2020-01-18 PROCEDURE — 80053 COMPREHEN METABOLIC PANEL: CPT | Performed by: PHYSICIAN ASSISTANT

## 2020-01-18 PROCEDURE — 86901 BLOOD TYPING SEROLOGIC RH(D): CPT

## 2020-01-18 PROCEDURE — 25010000002 PROPOFOL 10 MG/ML EMULSION: Performed by: NURSE ANESTHETIST, CERTIFIED REGISTERED

## 2020-01-18 PROCEDURE — 99284 EMERGENCY DEPT VISIT MOD MDM: CPT

## 2020-01-18 PROCEDURE — 25010000002 VANCOMYCIN 1 G RECONSTITUTED SOLUTION 1 EACH VIAL: Performed by: ORTHOPAEDIC SURGERY

## 2020-01-18 DEVICE — SCRW CANN SD/ST PT 3.5X50MM
Type: IMPLANTABLE DEVICE | Site: PATELLA | Status: NON-FUNCTIONAL
Removed: 2020-03-13

## 2020-01-18 DEVICE — IMPLANTABLE DEVICE
Type: IMPLANTABLE DEVICE | Site: PATELLA | Status: NON-FUNCTIONAL
Removed: 2020-03-13

## 2020-01-18 RX ORDER — ACETAMINOPHEN 500 MG
1000 TABLET ORAL ONCE
Status: COMPLETED | OUTPATIENT
Start: 2020-01-18 | End: 2020-01-18

## 2020-01-18 RX ORDER — MELOXICAM 7.5 MG/1
15 TABLET ORAL ONCE
Status: COMPLETED | OUTPATIENT
Start: 2020-01-18 | End: 2020-01-18

## 2020-01-18 RX ORDER — GLYCOPYRROLATE 0.2 MG/ML
0.1 INJECTION INTRAMUSCULAR; INTRAVENOUS
Status: COMPLETED | OUTPATIENT
Start: 2020-01-18 | End: 2020-01-18

## 2020-01-18 RX ORDER — PROPOFOL 10 MG/ML
VIAL (ML) INTRAVENOUS AS NEEDED
Status: DISCONTINUED | OUTPATIENT
Start: 2020-01-18 | End: 2020-01-18 | Stop reason: SURG

## 2020-01-18 RX ORDER — MAGNESIUM HYDROXIDE 1200 MG/15ML
LIQUID ORAL AS NEEDED
Status: DISCONTINUED | OUTPATIENT
Start: 2020-01-18 | End: 2020-01-18 | Stop reason: HOSPADM

## 2020-01-18 RX ORDER — ROPIVACAINE HYDROCHLORIDE 5 MG/ML
INJECTION, SOLUTION EPIDURAL; INFILTRATION; PERINEURAL
Status: COMPLETED | OUTPATIENT
Start: 2020-01-18 | End: 2020-01-18

## 2020-01-18 RX ORDER — FENTANYL CITRATE 50 UG/ML
25 INJECTION, SOLUTION INTRAMUSCULAR; INTRAVENOUS AS NEEDED
Status: DISCONTINUED | OUTPATIENT
Start: 2020-01-18 | End: 2020-01-18 | Stop reason: HOSPADM

## 2020-01-18 RX ORDER — FENTANYL CITRATE 50 UG/ML
INJECTION, SOLUTION INTRAMUSCULAR; INTRAVENOUS AS NEEDED
Status: DISCONTINUED | OUTPATIENT
Start: 2020-01-18 | End: 2020-01-18 | Stop reason: SURG

## 2020-01-18 RX ORDER — KETAMINE HYDROCHLORIDE 100 MG/ML
INJECTION INTRAMUSCULAR; INTRAVENOUS AS NEEDED
Status: DISCONTINUED | OUTPATIENT
Start: 2020-01-18 | End: 2020-01-18 | Stop reason: SURG

## 2020-01-18 RX ORDER — HYDROMORPHONE HYDROCHLORIDE 1 MG/ML
1 INJECTION, SOLUTION INTRAMUSCULAR; INTRAVENOUS; SUBCUTANEOUS
Status: DISCONTINUED | OUTPATIENT
Start: 2020-01-18 | End: 2020-01-18 | Stop reason: HOSPADM

## 2020-01-18 RX ORDER — SODIUM CHLORIDE 9 MG/ML
40 INJECTION, SOLUTION INTRAVENOUS AS NEEDED
Status: DISCONTINUED | OUTPATIENT
Start: 2020-01-18 | End: 2020-01-18 | Stop reason: HOSPADM

## 2020-01-18 RX ORDER — ONDANSETRON 2 MG/ML
4 INJECTION INTRAMUSCULAR; INTRAVENOUS ONCE AS NEEDED
Status: DISCONTINUED | OUTPATIENT
Start: 2020-01-18 | End: 2020-01-18 | Stop reason: HOSPADM

## 2020-01-18 RX ORDER — MIDAZOLAM HYDROCHLORIDE 2 MG/2ML
1 INJECTION, SOLUTION INTRAMUSCULAR; INTRAVENOUS
Status: DISCONTINUED | OUTPATIENT
Start: 2020-01-18 | End: 2020-01-18 | Stop reason: HOSPADM

## 2020-01-18 RX ORDER — SODIUM CHLORIDE 0.9 % (FLUSH) 0.9 %
10 SYRINGE (ML) INJECTION AS NEEDED
Status: DISCONTINUED | OUTPATIENT
Start: 2020-01-18 | End: 2020-01-19 | Stop reason: HOSPADM

## 2020-01-18 RX ORDER — DEXAMETHASONE SODIUM PHOSPHATE 4 MG/ML
8 INJECTION, SOLUTION INTRA-ARTICULAR; INTRALESIONAL; INTRAMUSCULAR; INTRAVENOUS; SOFT TISSUE ONCE AS NEEDED
Status: DISCONTINUED | OUTPATIENT
Start: 2020-01-18 | End: 2020-01-18 | Stop reason: HOSPADM

## 2020-01-18 RX ORDER — DEXMEDETOMIDINE HYDROCHLORIDE 100 UG/ML
INJECTION, SOLUTION INTRAVENOUS AS NEEDED
Status: DISCONTINUED | OUTPATIENT
Start: 2020-01-18 | End: 2020-01-18 | Stop reason: SURG

## 2020-01-18 RX ORDER — SODIUM CHLORIDE 0.9 % (FLUSH) 0.9 %
10 SYRINGE (ML) INJECTION EVERY 12 HOURS SCHEDULED
Status: DISCONTINUED | OUTPATIENT
Start: 2020-01-18 | End: 2020-01-18 | Stop reason: HOSPADM

## 2020-01-18 RX ORDER — SODIUM CHLORIDE, SODIUM LACTATE, POTASSIUM CHLORIDE, CALCIUM CHLORIDE 600; 310; 30; 20 MG/100ML; MG/100ML; MG/100ML; MG/100ML
9 INJECTION, SOLUTION INTRAVENOUS CONTINUOUS
Status: DISCONTINUED | OUTPATIENT
Start: 2020-01-18 | End: 2020-01-19

## 2020-01-18 RX ORDER — HYDROMORPHONE HYDROCHLORIDE 1 MG/ML
0.5 INJECTION, SOLUTION INTRAMUSCULAR; INTRAVENOUS; SUBCUTANEOUS AS NEEDED
Status: DISCONTINUED | OUTPATIENT
Start: 2020-01-18 | End: 2020-01-18 | Stop reason: HOSPADM

## 2020-01-18 RX ORDER — LIDOCAINE HYDROCHLORIDE 20 MG/ML
INJECTION, SOLUTION INFILTRATION; PERINEURAL AS NEEDED
Status: DISCONTINUED | OUTPATIENT
Start: 2020-01-18 | End: 2020-01-18 | Stop reason: SURG

## 2020-01-18 RX ORDER — FAMOTIDINE 20 MG/1
20 TABLET, FILM COATED ORAL
Status: COMPLETED | OUTPATIENT
Start: 2020-01-18 | End: 2020-01-18

## 2020-01-18 RX ORDER — MEPERIDINE HYDROCHLORIDE 25 MG/ML
12.5 INJECTION INTRAMUSCULAR; INTRAVENOUS; SUBCUTANEOUS
Status: DISCONTINUED | OUTPATIENT
Start: 2020-01-18 | End: 2020-01-18 | Stop reason: HOSPADM

## 2020-01-18 RX ORDER — MIDAZOLAM HYDROCHLORIDE 2 MG/2ML
2 INJECTION, SOLUTION INTRAMUSCULAR; INTRAVENOUS
Status: DISCONTINUED | OUTPATIENT
Start: 2020-01-18 | End: 2020-01-18 | Stop reason: HOSPADM

## 2020-01-18 RX ORDER — LIDOCAINE HYDROCHLORIDE 10 MG/ML
0.5 INJECTION, SOLUTION EPIDURAL; INFILTRATION; INTRACAUDAL; PERINEURAL ONCE AS NEEDED
Status: DISCONTINUED | OUTPATIENT
Start: 2020-01-18 | End: 2020-01-18 | Stop reason: HOSPADM

## 2020-01-18 RX ORDER — FAMOTIDINE 10 MG/ML
20 INJECTION, SOLUTION INTRAVENOUS
Status: COMPLETED | OUTPATIENT
Start: 2020-01-18 | End: 2020-01-18

## 2020-01-18 RX ORDER — SODIUM CHLORIDE 0.9 % (FLUSH) 0.9 %
10 SYRINGE (ML) INJECTION AS NEEDED
Status: DISCONTINUED | OUTPATIENT
Start: 2020-01-18 | End: 2020-01-18 | Stop reason: HOSPADM

## 2020-01-18 RX ORDER — PREGABALIN 75 MG/1
150 CAPSULE ORAL ONCE
Status: COMPLETED | OUTPATIENT
Start: 2020-01-18 | End: 2020-01-18

## 2020-01-18 RX ORDER — HYDROCODONE BITARTRATE AND ACETAMINOPHEN 5; 325 MG/1; MG/1
1 TABLET ORAL ONCE
Status: COMPLETED | OUTPATIENT
Start: 2020-01-18 | End: 2020-01-18

## 2020-01-18 RX ORDER — SODIUM CHLORIDE, SODIUM LACTATE, POTASSIUM CHLORIDE, CALCIUM CHLORIDE 600; 310; 30; 20 MG/100ML; MG/100ML; MG/100ML; MG/100ML
100 INJECTION, SOLUTION INTRAVENOUS CONTINUOUS
Status: DISCONTINUED | OUTPATIENT
Start: 2020-01-18 | End: 2020-01-19 | Stop reason: HOSPADM

## 2020-01-18 RX ORDER — CEFAZOLIN SODIUM 2 G/50ML
2 SOLUTION INTRAVENOUS ONCE
Status: COMPLETED | OUTPATIENT
Start: 2020-01-18 | End: 2020-01-18

## 2020-01-18 RX ADMIN — CEFAZOLIN SODIUM 2 G: 2 SOLUTION INTRAVENOUS at 18:33

## 2020-01-18 RX ADMIN — VANCOMYCIN HYDROCHLORIDE 2500 MG: 1 INJECTION, POWDER, LYOPHILIZED, FOR SOLUTION INTRAVENOUS at 21:09

## 2020-01-18 RX ADMIN — GLYCOPYRROLATE 0.1 MG: 0.2 INJECTION INTRAMUSCULAR; INTRAVENOUS at 20:51

## 2020-01-18 RX ADMIN — MELOXICAM 15 MG: 7.5 TABLET ORAL at 20:49

## 2020-01-18 RX ADMIN — FENTANYL CITRATE 25 MCG: 50 INJECTION, SOLUTION INTRAMUSCULAR; INTRAVENOUS at 21:31

## 2020-01-18 RX ADMIN — KETAMINE HYDROCHLORIDE 50 MG: 100 INJECTION INTRAMUSCULAR; INTRAVENOUS at 21:36

## 2020-01-18 RX ADMIN — ACETAMINOPHEN 1000 MG: 500 TABLET, FILM COATED ORAL at 20:49

## 2020-01-18 RX ADMIN — SODIUM CHLORIDE, POTASSIUM CHLORIDE, SODIUM LACTATE AND CALCIUM CHLORIDE: 600; 310; 30; 20 INJECTION, SOLUTION INTRAVENOUS at 20:32

## 2020-01-18 RX ADMIN — PROPOFOL 200 MG: 10 INJECTION, EMULSION INTRAVENOUS at 21:16

## 2020-01-18 RX ADMIN — FENTANYL CITRATE 25 MCG: 50 INJECTION, SOLUTION INTRAMUSCULAR; INTRAVENOUS at 21:37

## 2020-01-18 RX ADMIN — PREGABALIN 150 MG: 75 CAPSULE ORAL at 20:49

## 2020-01-18 RX ADMIN — FENTANYL CITRATE 25 MCG: 50 INJECTION, SOLUTION INTRAMUSCULAR; INTRAVENOUS at 21:25

## 2020-01-18 RX ADMIN — DEXMEDETOMIDINE HYDROCHLORIDE 20 MCG: 100 INJECTION, SOLUTION, CONCENTRATE INTRAVENOUS at 21:30

## 2020-01-18 RX ADMIN — HYDROCODONE BITARTRATE AND ACETAMINOPHEN 1 TABLET: 5; 325 TABLET ORAL at 17:44

## 2020-01-18 RX ADMIN — ROPIVACAINE HYDROCHLORIDE 30 ML: 5 INJECTION, SOLUTION EPIDURAL; INFILTRATION; PERINEURAL at 20:58

## 2020-01-18 RX ADMIN — LIDOCAINE HYDROCHLORIDE 100 MG: 20 INJECTION, SOLUTION INFILTRATION; PERINEURAL at 21:15

## 2020-01-18 RX ADMIN — FAMOTIDINE 20 MG: 10 INJECTION INTRAVENOUS at 20:50

## 2020-01-18 RX ADMIN — DEXMEDETOMIDINE HYDROCHLORIDE 20 MCG: 100 INJECTION, SOLUTION, CONCENTRATE INTRAVENOUS at 21:18

## 2020-01-18 RX ADMIN — FENTANYL CITRATE 25 MCG: 50 INJECTION, SOLUTION INTRAMUSCULAR; INTRAVENOUS at 21:18

## 2020-01-19 ENCOUNTER — APPOINTMENT (OUTPATIENT)
Dept: GENERAL RADIOLOGY | Facility: HOSPITAL | Age: 30
End: 2020-01-19

## 2020-01-19 VITALS
TEMPERATURE: 97.8 F | RESPIRATION RATE: 16 BRPM | HEIGHT: 78 IN | HEART RATE: 86 BPM | WEIGHT: 315 LBS | DIASTOLIC BLOOD PRESSURE: 68 MMHG | BODY MASS INDEX: 36.45 KG/M2 | OXYGEN SATURATION: 92 % | SYSTOLIC BLOOD PRESSURE: 158 MMHG

## 2020-01-19 LAB
ANION GAP SERPL CALCULATED.3IONS-SCNC: 10.8 MMOL/L (ref 5–15)
BUN BLD-MCNC: 11 MG/DL (ref 6–20)
BUN/CREAT SERPL: 11.7 (ref 7–25)
CALCIUM SPEC-SCNC: 9.2 MG/DL (ref 8.6–10.5)
CHLORIDE SERPL-SCNC: 106 MMOL/L (ref 98–107)
CO2 SERPL-SCNC: 22.2 MMOL/L (ref 22–29)
CREAT BLD-MCNC: 0.94 MG/DL (ref 0.76–1.27)
DEPRECATED RDW RBC AUTO: 46.9 FL (ref 37–54)
ERYTHROCYTE [DISTWIDTH] IN BLOOD BY AUTOMATED COUNT: 12.8 % (ref 12.3–15.4)
GFR SERPL CREATININE-BSD FRML MDRD: 94 ML/MIN/1.73
GLUCOSE BLD-MCNC: 139 MG/DL (ref 65–99)
HCT VFR BLD AUTO: 40.9 % (ref 37.5–51)
HGB BLD-MCNC: 12.9 G/DL (ref 13–17.7)
MCH RBC QN AUTO: 31.2 PG (ref 26.6–33)
MCHC RBC AUTO-ENTMCNC: 31.5 G/DL (ref 31.5–35.7)
MCV RBC AUTO: 99 FL (ref 79–97)
PLATELET # BLD AUTO: 266 10*3/MM3 (ref 140–450)
PMV BLD AUTO: 10.6 FL (ref 6–12)
POTASSIUM BLD-SCNC: 4.4 MMOL/L (ref 3.5–5.2)
RBC # BLD AUTO: 4.13 10*6/MM3 (ref 4.14–5.8)
SODIUM BLD-SCNC: 139 MMOL/L (ref 136–145)
TROPONIN T SERPL-MCNC: <0.01 NG/ML (ref 0–0.03)
WBC NRBC COR # BLD: 15.93 10*3/MM3 (ref 3.4–10.8)

## 2020-01-19 PROCEDURE — G0378 HOSPITAL OBSERVATION PER HR: HCPCS

## 2020-01-19 PROCEDURE — 97161 PT EVAL LOW COMPLEX 20 MIN: CPT

## 2020-01-19 PROCEDURE — 80048 BASIC METABOLIC PNL TOTAL CA: CPT | Performed by: ORTHOPAEDIC SURGERY

## 2020-01-19 PROCEDURE — 71045 X-RAY EXAM CHEST 1 VIEW: CPT

## 2020-01-19 PROCEDURE — 90674 CCIIV4 VAC NO PRSV 0.5 ML IM: CPT | Performed by: ORTHOPAEDIC SURGERY

## 2020-01-19 PROCEDURE — 99213 OFFICE O/P EST LOW 20 MIN: CPT | Performed by: HOSPITALIST

## 2020-01-19 PROCEDURE — G0008 ADMIN INFLUENZA VIRUS VAC: HCPCS | Performed by: ORTHOPAEDIC SURGERY

## 2020-01-19 PROCEDURE — 84484 ASSAY OF TROPONIN QUANT: CPT | Performed by: FAMILY MEDICINE

## 2020-01-19 PROCEDURE — 25010000002 VANCOMYCIN 1 G RECONSTITUTED SOLUTION 1 EACH VIAL: Performed by: ORTHOPAEDIC SURGERY

## 2020-01-19 PROCEDURE — 85027 COMPLETE CBC AUTOMATED: CPT | Performed by: ORTHOPAEDIC SURGERY

## 2020-01-19 PROCEDURE — 25010000002 INFLUENZA VAC SUBUNIT QUAD 0.5 ML SUSPENSION PREFILLED SYRINGE: Performed by: ORTHOPAEDIC SURGERY

## 2020-01-19 PROCEDURE — 71046 X-RAY EXAM CHEST 2 VIEWS: CPT

## 2020-01-19 PROCEDURE — 93005 ELECTROCARDIOGRAM TRACING: CPT | Performed by: FAMILY MEDICINE

## 2020-01-19 PROCEDURE — 93010 ELECTROCARDIOGRAM REPORT: CPT | Performed by: INTERNAL MEDICINE

## 2020-01-19 RX ORDER — MORPHINE SULFATE 2 MG/ML
2 INJECTION, SOLUTION INTRAMUSCULAR; INTRAVENOUS EVERY 4 HOURS PRN
Status: DISCONTINUED | OUTPATIENT
Start: 2020-01-19 | End: 2020-01-19

## 2020-01-19 RX ORDER — OXYCODONE AND ACETAMINOPHEN 7.5; 325 MG/1; MG/1
2 TABLET ORAL EVERY 4 HOURS PRN
Status: DISCONTINUED | OUTPATIENT
Start: 2020-01-19 | End: 2020-01-19

## 2020-01-19 RX ORDER — NALOXONE HCL 0.4 MG/ML
0.4 VIAL (ML) INJECTION
Status: DISCONTINUED | OUTPATIENT
Start: 2020-01-19 | End: 2020-01-19 | Stop reason: HOSPADM

## 2020-01-19 RX ORDER — CETIRIZINE HYDROCHLORIDE 10 MG/1
10 TABLET ORAL DAILY
Status: DISCONTINUED | OUTPATIENT
Start: 2020-01-19 | End: 2020-01-19 | Stop reason: HOSPADM

## 2020-01-19 RX ORDER — OXYCODONE AND ACETAMINOPHEN 7.5; 325 MG/1; MG/1
1 TABLET ORAL EVERY 4 HOURS PRN
Status: DISCONTINUED | OUTPATIENT
Start: 2020-01-19 | End: 2020-01-19

## 2020-01-19 RX ORDER — ONDANSETRON 2 MG/ML
4 INJECTION INTRAMUSCULAR; INTRAVENOUS EVERY 6 HOURS PRN
Status: DISCONTINUED | OUTPATIENT
Start: 2020-01-19 | End: 2020-01-19 | Stop reason: HOSPADM

## 2020-01-19 RX ORDER — SODIUM CHLORIDE 9 MG/ML
100 INJECTION, SOLUTION INTRAVENOUS CONTINUOUS
Status: DISCONTINUED | OUTPATIENT
Start: 2020-01-19 | End: 2020-01-19 | Stop reason: HOSPADM

## 2020-01-19 RX ORDER — HYDROMORPHONE HYDROCHLORIDE 4 MG/1
4 TABLET ORAL EVERY 6 HOURS PRN
Qty: 40 TABLET | Refills: 0 | Status: SHIPPED | OUTPATIENT
Start: 2020-01-19 | End: 2020-01-27 | Stop reason: SDUPTHER

## 2020-01-19 RX ORDER — FUROSEMIDE 20 MG/1
20 TABLET ORAL DAILY
Status: DISCONTINUED | OUTPATIENT
Start: 2020-01-19 | End: 2020-01-19 | Stop reason: HOSPADM

## 2020-01-19 RX ORDER — HYDROMORPHONE HCL 110MG/55ML
2 PATIENT CONTROLLED ANALGESIA SYRINGE INTRAVENOUS
Status: DISCONTINUED | OUTPATIENT
Start: 2020-01-19 | End: 2020-01-19 | Stop reason: HOSPADM

## 2020-01-19 RX ADMIN — VANCOMYCIN HYDROCHLORIDE 2000 MG: 1 INJECTION, POWDER, LYOPHILIZED, FOR SOLUTION INTRAVENOUS at 08:14

## 2020-01-19 RX ADMIN — HYDROMORPHONE HYDROCHLORIDE 6 MG: 2 TABLET ORAL at 07:59

## 2020-01-19 RX ADMIN — INFLUENZA A VIRUS A/IDAHO/07/2018 (H1N1) ANTIGEN (MDCK CELL DERIVED, PROPIOLACTONE INACTIVATED, INFLUENZA A VIRUS A/INDIANA/08/2018 (H3N2) ANTIGEN (MDCK CELL DERIVED, PROPIOLACTONE INACTIVATED), INFLUENZA B VIRUS B/SINGAPORE/INFTT-16-0610/2016 ANTIGEN (MDCK CELL DERIVED, PROPIOLACTONE INACTIVATED), INFLUENZA B VIRUS B/IOWA/06/2017 ANTIGEN (MDCK CELL DERIVED, PROPIOLACTONE INACTIVATED) 0.5 ML: 15; 15; 15; 15 INJECTION, SUSPENSION INTRAMUSCULAR at 16:04

## 2020-01-19 RX ADMIN — HYDROMORPHONE HYDROCHLORIDE 6 MG: 2 TABLET ORAL at 14:47

## 2020-01-19 RX ADMIN — SODIUM CHLORIDE, POTASSIUM CHLORIDE, SODIUM LACTATE AND CALCIUM CHLORIDE 100 ML/HR: 600; 310; 30; 20 INJECTION, SOLUTION INTRAVENOUS at 01:51

## 2020-01-19 RX ADMIN — FUROSEMIDE 20 MG: 20 TABLET ORAL at 08:15

## 2020-01-19 RX ADMIN — CETIRIZINE HYDROCHLORIDE 10 MG: 10 TABLET, FILM COATED ORAL at 08:15

## 2020-01-19 RX ADMIN — APIXABAN 2.5 MG: 2.5 TABLET, FILM COATED ORAL at 08:15

## 2020-01-19 NOTE — ANESTHESIA PROCEDURE NOTES
Airway  Urgency: elective    Date/Time: 1/18/2020 9:19 PM  Airway not difficult    General Information and Staff    Patient location during procedure: OR    Indications and Patient Condition  Indications for airway management: airway protection    Preoxygenated: yes  MILS maintained throughout  Mask difficulty assessment: 1 - vent by mask    Final Airway Details  Final airway type: supraglottic airway      Successful airway: unique  Size 5    Number of attempts at approach: 1  Assessment: lips, teeth, and gum same as pre-op and atraumatic intubation

## 2020-01-19 NOTE — ANESTHESIA POSTPROCEDURE EVALUATION
Patient: Chaitanya Canales    Procedure Summary     Date:  01/18/20 Room / Location:   [facility] OR 2 /  [facility] OR    Anesthesia Start:  2111 Anesthesia Stop:  2235    Procedure:  irrigation and debridement of open patellar fracture, OPEN REDUCTION INTERNAL FIXATION right patella, and closure of wound <2cm (Right Knee) Diagnosis:       Type I or II open displaced longitudinal fracture of right patella, initial encounter      (Type I or II open displaced longitudinal fracture of right patella, initial encounter [S82.021B])    Surgeon:  Gerald Abraham MD Provider:  Jose Alfredo Ford CRNA    Anesthesia Type:  general with block ASA Status:  3 - Emergent          Anesthesia Type: general with block    Vitals  Vitals Value Taken Time   /98 1/18/2020 10:55 PM   Temp 98 °F (36.7 °C) 1/18/2020 10:32 PM   Pulse 90 1/18/2020 10:57 PM   Resp 14 1/18/2020 10:32 PM   SpO2 96 % 1/18/2020 10:57 PM   Vitals shown include unvalidated device data.        Post Anesthesia Care and Evaluation    Patient location during evaluation: PACU  Patient participation: complete - patient participated  Level of consciousness: awake  Pain score: 0  Pain management: adequate  Airway patency: patent  Anesthetic complications: No anesthetic complications  PONV Status: none  Cardiovascular status: acceptable  Respiratory status: acceptable  Hydration status: acceptable

## 2020-01-19 NOTE — ANESTHESIA PROCEDURE NOTES
Peripheral Block    Pre-sedation assessment completed: 1/18/2020 8:55 PM    Patient reassessed immediately prior to procedure    Reason for block: post-op pain management  Preanesthetic Checklist  Completed: patient identified, site marked, surgical consent, pre-op evaluation, timeout performed, IV checked, risks and benefits discussed and monitors and equipment checked  Prep:  Pt Position: supine  Sterile barriers:cap, gloves and sterile barriers  Prep: ChloraPrep  Patient monitoring: blood pressure monitoring, continuous pulse oximetry and EKG  Procedure  Sedation:yes  Performed under: local infiltration  Guidance:nerve stimulator and landmark technique  Images:still images not obtained    Laterality:right  Block Type:femoral  Injection Technique:single-shot  Needle Type:short-bevel      Medications Used: ropivacaine (NAROPIN) injection 0.5 %, 30 mL  Med admintered at 1/18/2020 8:58 PM      Post Assessment  Injection Assessment: negative aspiration for heme, no paresthesia on injection and incremental injection  Patient Tolerance:comfortable throughout block  Complications:no

## 2020-01-20 ENCOUNTER — READMISSION MANAGEMENT (OUTPATIENT)
Dept: CALL CENTER | Facility: HOSPITAL | Age: 30
End: 2020-01-20

## 2020-01-20 NOTE — OUTREACH NOTE
Prep Survey      Responses   Facility patient discharged from?  [facility]   Is LACE score < 7 ?  Yes   Is patient eligible?  Yes   Discharge diagnosis  Displaced longitudinal fracture of right patells, s/p Irrigation debridement right open patella fraction with ORIF and closure of wound, hx CML in remission, facial cellulitis   Does the patient have one of the following disease processes/diagnoses(primary or secondary)?  General Surgery   Does the patient have Home health ordered?  No   Is there a DME ordered?  Yes   What DME was ordered?  Pt issued crutches   Comments regarding appointments  Pt to schedule F/U appointments   Prep survey completed?  Yes          Aruna Hayden RN

## 2020-01-22 ENCOUNTER — READMISSION MANAGEMENT (OUTPATIENT)
Dept: CALL CENTER | Facility: HOSPITAL | Age: 30
End: 2020-01-22

## 2020-01-22 NOTE — OUTREACH NOTE
General Surgery Week 1 Survey      Responses   Facility patient discharged from?  [facility]   Does the patient have one of the following disease processes/diagnoses(primary or secondary)?  General Surgery   Is there a successful TCM telephone encounter documented?  No   Week 1 attempt successful?  Yes   Call start time  1033   Call end time  1035   Discharge diagnosis  Displaced longitudinal fracture of right patells, s/p Irrigation debridement right open patella fraction with ORIF and closure of wound, hx CML in remission, facial cellulitis   Is the patient having any side effects they believe may be caused by any medication additions or changes?  No   Does the patient have all medications related to this admission filled (includes all antibiotics, pain medications, etc.)  Yes   Is the patient taking all medications as directed (includes completed medication regime)?  Yes   Does the patient have a follow up appointment scheduled with their surgeon?  Yes   Has the patient kept scheduled appointments due by today?  Yes   Comments  friday- NP    Psychosocial issues?  No   Did the patient receive a copy of their discharge instructions?  Yes   Nursing interventions  Reviewed instructions with patient   What is the patient's perception of their health status since discharge?  Same   Nursing interventions  Nurse provided patient education   Is the patient /caregiver able to teach back basic post-op care?  Continue use of incentive spirometry at least 1 week post discharge   Is the patient/caregiver able to teach back signs and symptoms of incisional infection?  Increased redness, swelling or pain at the incisonal site, Increased drainage or bleeding, Incisional warmth, Pus or odor from incision, Fever   Is the patient/caregiver able to teach back steps to recovery at home?  Set small, achievable goals for return to baseline health, Make a list of questions for surgeon's appointment, Eat a well-balance diet   Is the  patient/caregiver able to teach back the hierarchy of who to call/visit for symptoms/problems? PCP, Specialist, Home health nurse, Urgent Care, ED, 911  Yes   Week 1 call completed?  Yes   Graduated/Revoked comments  pt stated still in pain but doing better.          Aye Adkins, RN

## 2020-01-23 ENCOUNTER — HOSPITAL ENCOUNTER (OUTPATIENT)
Dept: PHYSICAL THERAPY | Facility: HOSPITAL | Age: 30
Setting detail: THERAPIES SERIES
Discharge: HOME | End: 2020-01-23

## 2020-01-23 DIAGNOSIS — S82.021A CLOSED DISPLACED LONGITUDINAL FRACTURE OF RIGHT PATELLA, INITIAL ENCOUNTER: Primary | ICD-10-CM

## 2020-01-23 PROCEDURE — 97161 PT EVAL LOW COMPLEX 20 MIN: CPT

## 2020-01-23 PROCEDURE — 97110 THERAPEUTIC EXERCISES: CPT
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
## 2020-01-24 ENCOUNTER — OFFICE VISIT (OUTPATIENT)
Dept: ORTHOPEDIC SURGERY | Facility: CLINIC | Age: 30
End: 2020-01-24

## 2020-01-24 VITALS — WEIGHT: 315 LBS | BODY MASS INDEX: 36.45 KG/M2 | HEIGHT: 78 IN

## 2020-01-24 DIAGNOSIS — Z98.890 STATUS POST OPEN REDUCTION WITH INTERNAL FIXATION OF FRACTURE: Primary | ICD-10-CM

## 2020-01-24 DIAGNOSIS — Z87.81 STATUS POST OPEN REDUCTION WITH INTERNAL FIXATION OF FRACTURE: Primary | ICD-10-CM

## 2020-01-24 PROCEDURE — 99024 POSTOP FOLLOW-UP VISIT: CPT | Performed by: NURSE PRACTITIONER

## 2020-01-24 NOTE — PROGRESS NOTES
CC: Follow-up status post irrigation debridement of open patellar fracture, ORIF right patella closure of wound, right, DOS 01/18/2020    Interval history: Chaitanya Canales returns to clinic for follow-up right lower extremity.  He has been weightbearing right lower extremity and knee immobilizer tolerating well.  Pain well controlled with current medication regimen is improving.  Denies presence of numbness or tingling right lower extremity.  Started on Eliquis twice daily for DVT prophylaxis and has continued taking.    Exam:  Right knee examined out of dressing  Sutures clean dry and intact negative for erythema, drainage  Mild swelling   Positive sensation light touch all distributions right lower extremity  2+ posterior tibialis pulse  Flex extend and ankle all digits right foot  Negative calf tenderness, negative Homans sign    Assessment: Status post irrigation debridement of open patellar fracture, ORIF right patella closure of wound  Plan:  1.  Discussed plan of care with patient.  Sutures removed Steri-Strips replaced encouraged to avoid submerging down into water for the next 3 weeks.  Hinged brace applied to the right lower extremity discussed with weightbearing activities brace locked at 0 degrees may unlock to 30 degrees with PT and range of motion.  Discussed may shower let the water run over I do recommend shower chair.  Continue with crutches as needed for stability.  Discussed to avoid driving while taking narcotic medications.  He has follow-up with outpatient physical therapy on 1/28/2020.  He will see Dr. Abraham in 3 weeks with x-rays right knee. Patient verbalized understanding of all information and agrees with plan of care. Denies all other concerns present at this time.     Answers for HPI/ROS submitted by the patient on 1/23/2020   Lower extremity pain  Incident occurred: 5 to 7 days ago  Incident location: at home  Injury mechanism: a direct blow  Pain location: right knee  Pain quality:  aching, shooting  Pain - numeric: 7/10  Pain course: improving  tingling: No  inability to bear weight: No  loss of motion: No  loss of sensation: No  muscle weakness: No  Foreign body present: no foreign bodies  Aggravated by: movement

## 2020-01-27 DIAGNOSIS — S82.021A CLOSED DISPLACED LONGITUDINAL FRACTURE OF RIGHT PATELLA, INITIAL ENCOUNTER: ICD-10-CM

## 2020-01-27 RX ORDER — HYDROMORPHONE HYDROCHLORIDE 4 MG/1
4 TABLET ORAL EVERY 6 HOURS PRN
Qty: 40 TABLET | Refills: 0 | Status: SHIPPED | OUTPATIENT
Start: 2020-01-27 | End: 2020-02-03 | Stop reason: SDUPTHER

## 2020-01-28 ENCOUNTER — HOSPITAL ENCOUNTER (OUTPATIENT)
Dept: PHYSICAL THERAPY | Facility: HOSPITAL | Age: 30
Setting detail: THERAPIES SERIES
Discharge: HOME | End: 2020-01-28

## 2020-01-28 DIAGNOSIS — S82.021A CLOSED DISPLACED LONGITUDINAL FRACTURE OF RIGHT PATELLA, INITIAL ENCOUNTER: Primary | ICD-10-CM

## 2020-01-28 PROCEDURE — 97110 THERAPEUTIC EXERCISES: CPT
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
## 2020-01-30 ENCOUNTER — TELEPHONE (OUTPATIENT)
Dept: ONCOLOGY | Facility: CLINIC | Age: 30
End: 2020-01-30

## 2020-01-30 NOTE — TELEPHONE ENCOUNTER
Imelda from [facility] in regards to Sprycel medication. Needed updated labs and chart notes to process this. Callback at [phone]. Fax is [phone]

## 2020-01-31 ENCOUNTER — HOSPITAL ENCOUNTER (OUTPATIENT)
Dept: PHYSICAL THERAPY | Facility: HOSPITAL | Age: 30
Setting detail: THERAPIES SERIES
Discharge: HOME | End: 2020-01-31

## 2020-01-31 DIAGNOSIS — S82.021A CLOSED DISPLACED LONGITUDINAL FRACTURE OF RIGHT PATELLA, INITIAL ENCOUNTER: Primary | ICD-10-CM

## 2020-01-31 PROCEDURE — 97110 THERAPEUTIC EXERCISES: CPT

## 2020-02-03 DIAGNOSIS — S82.021A CLOSED DISPLACED LONGITUDINAL FRACTURE OF RIGHT PATELLA, INITIAL ENCOUNTER: ICD-10-CM

## 2020-02-03 RX ORDER — HYDROMORPHONE HYDROCHLORIDE 4 MG/1
4 TABLET ORAL 3 TIMES DAILY PRN
Qty: 30 TABLET | Refills: 0 | Status: SHIPPED | OUTPATIENT
Start: 2020-02-03 | End: 2020-02-25

## 2020-02-03 NOTE — TELEPHONE ENCOUNTER
Need to decrease frequency to every 8 hrs, no more scripts for Dilaudid- will have to back down to other options if still having pain

## 2020-02-05 ENCOUNTER — HOSPITAL ENCOUNTER (OUTPATIENT)
Dept: PHYSICAL THERAPY | Facility: HOSPITAL | Age: 30
Setting detail: THERAPIES SERIES
Discharge: HOME | End: 2020-02-05

## 2020-02-05 DIAGNOSIS — S82.021A CLOSED DISPLACED LONGITUDINAL FRACTURE OF RIGHT PATELLA, INITIAL ENCOUNTER: Primary | ICD-10-CM

## 2020-02-05 PROCEDURE — 97110 THERAPEUTIC EXERCISES: CPT
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
## 2020-02-12 ENCOUNTER — HOSPITAL ENCOUNTER (OUTPATIENT)
Dept: PHYSICAL THERAPY | Facility: HOSPITAL | Age: 30
Setting detail: THERAPIES SERIES
Discharge: HOME | End: 2020-02-12

## 2020-02-12 DIAGNOSIS — S82.021A CLOSED DISPLACED LONGITUDINAL FRACTURE OF RIGHT PATELLA, INITIAL ENCOUNTER: Primary | ICD-10-CM

## 2020-02-12 PROCEDURE — 97110 THERAPEUTIC EXERCISES: CPT
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
## 2020-02-14 ENCOUNTER — OFFICE VISIT (OUTPATIENT)
Dept: ORTHOPEDIC SURGERY | Facility: CLINIC | Age: 30
End: 2020-02-14

## 2020-02-14 ENCOUNTER — TELEPHONE (OUTPATIENT)
Dept: ORTHOPEDIC SURGERY | Facility: CLINIC | Age: 30
End: 2020-02-14

## 2020-02-14 VITALS — WEIGHT: 315 LBS | HEIGHT: 78 IN | BODY MASS INDEX: 36.45 KG/M2

## 2020-02-14 DIAGNOSIS — Z98.890 STATUS POST OPEN REDUCTION WITH INTERNAL FIXATION OF FRACTURE: ICD-10-CM

## 2020-02-14 DIAGNOSIS — Z87.81 STATUS POST OPEN REDUCTION WITH INTERNAL FIXATION OF FRACTURE: ICD-10-CM

## 2020-02-14 DIAGNOSIS — R52 PAIN: Primary | ICD-10-CM

## 2020-02-14 PROCEDURE — 99024 POSTOP FOLLOW-UP VISIT: CPT | Performed by: ORTHOPAEDIC SURGERY

## 2020-02-14 RX ORDER — DOXYCYCLINE HYCLATE 50 MG/1
100 CAPSULE ORAL 2 TIMES DAILY
Qty: 40 CAPSULE | Refills: 0 | Status: SHIPPED | OUTPATIENT
Start: 2020-02-14 | End: 2020-02-25

## 2020-02-14 RX ORDER — DOXYCYCLINE HYCLATE 100 MG/1
100 TABLET, DELAYED RELEASE ORAL 2 TIMES DAILY
Qty: 20 TABLET | Refills: 0 | Status: SHIPPED | OUTPATIENT
Start: 2020-02-14 | End: 2020-02-25

## 2020-02-14 NOTE — PROGRESS NOTES
CC: Follow-up status post Irrigation and excisional debridement of right grade 1 open patella fracture including skin, subcutaneous tissue, muscle, and bone, Open reduction internal fixation right patella fracture and simple closure right anterior knee transverse laceration 2 cm in length DOS:20    Interval History: Patient returns to clinic today stating he is doing well. He has not noticed any drainage from his knee. He has been working with physical therapy. He complains of residual aching pain in his right knee. He denies any numbness and tingling.     Exam:  Right Knee-  ROM 0-90 degrees  4/5 strength on flexion and extension  Mild serous discharge over anterior knee    Imagin view x-rays right knee from today's visit, AP and lateral views, ordered and reviewed by me, indication status post ORIF right patella fracture.  Fracture appears to be in stable position with good fixation of screws, no evidence of displacement, minimal early callus formation noted.  Compared to prior postoperative x-rays from hospital.      Impression: status post Irrigation and excisional debridement of right grade 1 open patella fracture including skin, subcutaneous tissue, muscle, and bone, Open reduction internal fixation right patella fracture and simple closure right anterior knee transverse laceration 2 cm in length     Plan:  1. Patient had all questions answered today and was in agreement with treatment plan.  2. Clean wound twice per day with mixture of peroxide and water.   3. He should cover area of erythema with bandage while performing activities.  4. Prescribed doxycycline today.   5. Patient to continue wearing brace but may unlock the brace to 90 degrees.   6. Follow-up in one week.       By signing my name here, I Mony Hernandez, attest that all documentation on 20 at 7:59 AM has been prepared under the direction and in the presence of Dr. Gerald Abraham.    I, Dr. Gerald Abraham, personally performed  the services described in this documentation, as scribed by Mony Hernandez, in my presence, and it is both accurate and complete.

## 2020-02-18 ENCOUNTER — TELEPHONE (OUTPATIENT)
Dept: ORTHOPEDIC SURGERY | Facility: CLINIC | Age: 30
End: 2020-02-18

## 2020-02-18 DIAGNOSIS — Z87.81 STATUS POST OPEN REDUCTION WITH INTERNAL FIXATION OF FRACTURE: Primary | ICD-10-CM

## 2020-02-18 DIAGNOSIS — Z98.890 STATUS POST OPEN REDUCTION WITH INTERNAL FIXATION OF FRACTURE: Primary | ICD-10-CM

## 2020-02-18 RX ORDER — HYDROMORPHONE HYDROCHLORIDE 2 MG/1
2 TABLET ORAL EVERY 6 HOURS PRN
Qty: 30 TABLET | Refills: 0 | Status: SHIPPED | OUTPATIENT
Start: 2020-02-18 | End: 2020-03-08 | Stop reason: SDUPTHER

## 2020-02-19 ENCOUNTER — APPOINTMENT (OUTPATIENT)
Dept: PHYSICAL THERAPY | Facility: HOSPITAL | Age: 30
End: 2020-02-19

## 2020-02-21 ENCOUNTER — HOSPITAL ENCOUNTER (OUTPATIENT)
Dept: PHYSICAL THERAPY | Facility: HOSPITAL | Age: 30
Setting detail: THERAPIES SERIES
Discharge: HOME | End: 2020-02-21

## 2020-02-21 DIAGNOSIS — S82.021A CLOSED DISPLACED LONGITUDINAL FRACTURE OF RIGHT PATELLA, INITIAL ENCOUNTER: Primary | ICD-10-CM

## 2020-02-21 PROCEDURE — 97110 THERAPEUTIC EXERCISES: CPT
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
## 2020-02-25 ENCOUNTER — OFFICE VISIT (OUTPATIENT)
Dept: ORTHOPEDIC SURGERY | Facility: CLINIC | Age: 30
End: 2020-02-25

## 2020-02-25 DIAGNOSIS — Z98.890 STATUS POST OPEN REDUCTION WITH INTERNAL FIXATION OF FRACTURE: Primary | ICD-10-CM

## 2020-02-25 DIAGNOSIS — Z87.81 STATUS POST OPEN REDUCTION WITH INTERNAL FIXATION OF FRACTURE: Primary | ICD-10-CM

## 2020-02-25 PROCEDURE — 99024 POSTOP FOLLOW-UP VISIT: CPT | Performed by: ORTHOPAEDIC SURGERY

## 2020-02-25 RX ORDER — EPINEPHRINE 0.3 MG/.3ML
INJECTION SUBCUTANEOUS
COMMUNITY
Start: 2020-01-27

## 2020-02-25 NOTE — PROGRESS NOTES
CC: Follow-up status post Irrigation and excisional debridement of right grade 1 open patella fracture including skin, subcutaneous tissue, muscle, and bone, Open reduction internal fixation right patella fracture and simple closure right anterior knee transverse laceration 2 cm in length DOS:1/18/20    Interval History: Patient returns to clinic today stating he is doing well. He complains of some residual soreness. He states the drainage has improved but still occurs about once per day. He has finished the course of the doxycycline. He states he has been cleaning his incision twice per day with peroxide and water. He has continued to work with physical therapy. He has been using the brace as instructed. He denies any associated numbness and tingling.     Exam:  Right Knee-   Incision healing well, 3 small scabs left with no residual erythema, no active drainage and no fluid collection  ROM 0-95 degrees  4/5 on flexion  4/5 on extension  Positive sensation light tough all distributions symmetric to contralateral side  Brisk cap refill all digits  2+ dorsalis pedis pulse      Impression: status post Irrigation and excisional debridement of right grade 1 open patella fracture including skin, subcutaneous tissue, muscle, and bone, Open reduction internal fixation right patella fracture and simple closure right anterior knee transverse laceration 2 cm in length     Plan:  1. Patient had all questions answered today and was in agreement with treatment plan.  2. Transition to drytex brace with lock at 90 degrees.   3. Advised patient to clean the healing incision two times per day with a mixture of water and peroxide.   4. Follow-up in 4 weeks for reassessment with x-rays right knee      By signing my name here, YAZ Hernandez, attest that all documentation on 02/25/20 at 9:31 AM has been prepared under the direction and in the presence of Dr. Gerald Abraham.    I, Dr. Gerald Abraham, personally performed the services  described in this documentation, as scribed by Mony Hernandez, in my presence, and it is both accurate and complete.

## 2020-02-28 ENCOUNTER — APPOINTMENT (OUTPATIENT)
Dept: PHYSICAL THERAPY | Facility: HOSPITAL | Age: 30
End: 2020-02-28

## 2020-03-03 ENCOUNTER — TELEPHONE (OUTPATIENT)
Dept: ORTHOPEDIC SURGERY | Facility: CLINIC | Age: 30
End: 2020-03-03

## 2020-03-03 DIAGNOSIS — Z98.890 STATUS POST OPEN REDUCTION WITH INTERNAL FIXATION OF FRACTURE: Primary | ICD-10-CM

## 2020-03-03 DIAGNOSIS — Z87.81 STATUS POST OPEN REDUCTION WITH INTERNAL FIXATION OF FRACTURE: Primary | ICD-10-CM

## 2020-03-03 RX ORDER — HYDROMORPHONE HYDROCHLORIDE 2 MG/1
2 TABLET ORAL EVERY 4 HOURS PRN
Qty: 20 TABLET | Refills: 0 | OUTPATIENT
Start: 2020-03-03 | End: 2020-03-09

## 2020-03-03 NOTE — TELEPHONE ENCOUNTER
"Patient slipped this morning, he did have his knee brace on, did not land on knee but leg did go out behind him and says he kind of \"joi\" it.  Says his knee is sore now, no increased swelling.  He is asking if something can be sent in for his pain.  "

## 2020-03-08 ENCOUNTER — HOSPITAL ENCOUNTER (EMERGENCY)
Facility: HOSPITAL | Age: 30
Discharge: HOME | End: 2020-03-09
Attending: EMERGENCY MEDICINE | Admitting: EMERGENCY MEDICINE

## 2020-03-08 DIAGNOSIS — S82.091A OTHER CLOSED FRACTURE OF RIGHT PATELLA, INITIAL ENCOUNTER: Primary | ICD-10-CM

## 2020-03-08 PROCEDURE — 99283 EMERGENCY DEPT VISIT LOW MDM: CPT

## 2020-03-09 ENCOUNTER — APPOINTMENT (OUTPATIENT)
Dept: GENERAL RADIOLOGY | Facility: HOSPITAL | Age: 30
End: 2020-03-09

## 2020-03-09 ENCOUNTER — HOSPITAL ENCOUNTER (OUTPATIENT)
Dept: CT IMAGING | Facility: HOSPITAL | Age: 30
Discharge: HOME | End: 2020-03-09

## 2020-03-09 ENCOUNTER — OFFICE VISIT (OUTPATIENT)
Dept: ORTHOPEDIC SURGERY | Facility: CLINIC | Age: 30
End: 2020-03-09

## 2020-03-09 VITALS
BODY MASS INDEX: 36.45 KG/M2 | TEMPERATURE: 98.1 F | RESPIRATION RATE: 18 BRPM | HEART RATE: 87 BPM | OXYGEN SATURATION: 98 % | WEIGHT: 315 LBS | DIASTOLIC BLOOD PRESSURE: 75 MMHG | HEIGHT: 78 IN | SYSTOLIC BLOOD PRESSURE: 149 MMHG

## 2020-03-09 DIAGNOSIS — Z87.81 STATUS POST OPEN REDUCTION WITH INTERNAL FIXATION OF FRACTURE: ICD-10-CM

## 2020-03-09 DIAGNOSIS — Z87.81 STATUS POST OPEN REDUCTION WITH INTERNAL FIXATION OF FRACTURE: Primary | ICD-10-CM

## 2020-03-09 DIAGNOSIS — S82.021A CLOSED DISPLACED LONGITUDINAL FRACTURE OF RIGHT PATELLA, INITIAL ENCOUNTER: ICD-10-CM

## 2020-03-09 DIAGNOSIS — Z98.890 STATUS POST OPEN REDUCTION WITH INTERNAL FIXATION OF FRACTURE: ICD-10-CM

## 2020-03-09 DIAGNOSIS — Z98.890 STATUS POST OPEN REDUCTION WITH INTERNAL FIXATION OF FRACTURE: Primary | ICD-10-CM

## 2020-03-09 PROCEDURE — 73560 X-RAY EXAM OF KNEE 1 OR 2: CPT

## 2020-03-09 PROCEDURE — 99282 EMERGENCY DEPT VISIT SF MDM: CPT | Performed by: EMERGENCY MEDICINE

## 2020-03-09 PROCEDURE — 73700 CT LOWER EXTREMITY W/O DYE: CPT

## 2020-03-09 PROCEDURE — 99024 POSTOP FOLLOW-UP VISIT: CPT | Performed by: ORTHOPAEDIC SURGERY

## 2020-03-09 RX ORDER — HYDROMORPHONE HYDROCHLORIDE 2 MG/1
4 TABLET ORAL ONCE
Status: COMPLETED | OUTPATIENT
Start: 2020-03-09 | End: 2020-03-09

## 2020-03-09 RX ORDER — HYDROMORPHONE HYDROCHLORIDE 2 MG/1
2 TABLET ORAL EVERY 4 HOURS PRN
Qty: 15 TABLET | Refills: 0 | Status: SHIPPED | OUTPATIENT
Start: 2020-03-09 | End: 2020-03-11 | Stop reason: SDUPTHER

## 2020-03-09 RX ADMIN — HYDROMORPHONE HYDROCHLORIDE 4 MG: 2 TABLET ORAL at 01:01

## 2020-03-09 NOTE — ED PROVIDER NOTES
EMERGENCY DEPARTMENT ENCOUNTER      Room Number: 5/05      HPI:    Chief complaint: Knee pain    Location: Right knee    Quality/Severity: Tightness and pressure sensation of moderate severity    Timing/Duration: Patient had patellar surgery January 18 and reinjured knee 1 week ago and a near fall.    Modifying Factors: Ambulation and bending knee causes increased pain    Associated Symptoms: Swelling and bruising    Narrative: Pt is a 29 y.o. male who presents complaining of right knee pain as noted above.  Again, the patient had right patella surgery for a fracture on January 18.  Patient was progressing well until approximately 1 week ago when the patient slipped, nearly fell and twisted the right knee.  Since that time the knee has progressively gotten more swollen and painful.  Patient is now noticing some ecchymosis over the past 2 days.      PMD: Álvaro Wahsington MD    REVIEW OF SYSTEMS  Review of Systems   Constitutional: Positive for activity change.        Obesity   Musculoskeletal: Positive for gait problem (Due to current problem). Negative for back pain.   All other systems reviewed and are negative.      PAST MEDICAL HISTORY  Active Ambulatory Problems     Diagnosis Date Noted   • Chronic myeloid leukemia, BCR/ABL-positive, in remission (CMS/[facility]) 05/17/2016   • Therapeutic drug monitoring 05/01/2017   • Facial cellulitis 01/28/2019   • Displaced longitudinal fracture of right patella 01/18/2020   • Status post open reduction with internal fixation of patella fracture, 01/18/2020 01/24/2020     Resolved Ambulatory Problems     Diagnosis Date Noted   • No Resolved Ambulatory Problems     Past Medical History:   Diagnosis Date   • Cancer (CMS/[facility]) 05/2014   • Hypertension    • Injury of back    • Laceration of lower leg with infection 2016   • Leukocytosis    • Osteoarthritis    • Pain in back    • Tattoos        PAST SURGICAL HISTORY  Past Surgical History:   Procedure Laterality Date   • ADENOIDECTOMY      • LEG SURGERY Right     8 surgeries due to chronic infection   • PAIN PUMP INSERTION/REVISION     • PAIN PUMP INSERTION/REVISION      back   • PATELLA OPEN REDUCTION INTERNAL FIXATION Right 1/18/2020    Procedure: irrigation and debridement of open patellar fracture, OPEN REDUCTION INTERNAL FIXATION right patella, and closure of wound <2cm;  Surgeon: Gerald Abraham MD;  Location: [facility];  Service: Orthopedics   • SKIN GRAFT Right     leg   • TONSILLECTOMY         FAMILY HISTORY  Family History   Adopted: Yes   Family history unknown: Yes       SOCIAL HISTORY  Social History     Socioeconomic History   • Marital status:      Spouse name: Merritt   • Number of children: 3   • Years of education: Some College   • Highest education level: Not on file   Occupational History     Employer: [employer]   Tobacco Use   • Smoking status: Current Every Day Smoker     Packs/day: 1.00     Years: 6.00     Pack years: 6.00     Types: Cigarettes     Last attempt to quit: 6/24/2016     Years since quitting: 3.7   • Smokeless tobacco: Never Used   • Tobacco comment: caffeine use   Substance and Sexual Activity   • Alcohol use: No   • Drug use: No     Comment: tattoos   • Sexual activity: Defer     Partners: Female       ALLERGIES  Codeine; Morphine; Sulfa antibiotics; Sulfamethoxazole-trimethoprim; and Ultram [tramadol]    PHYSICAL EXAM  ED Triage Vitals [03/08/20 2252]   Temp Heart Rate Resp BP SpO2   98.1 °F (36.7 °C) 95 20 163/98 97 %      Temp src Heart Rate Source Patient Position BP Location FiO2 (%)   Oral Monitor Sitting Right arm --       Physical Exam   Constitutional: He is oriented to person, place, and time.   The patient is an obese, 29-year-old, white male in no acute distress.   HENT:   Head: Normocephalic and atraumatic.   Musculoskeletal:   Examination of the right knee does show a new, well-healed, anterior surgical scar.  There is mild diffuse swelling and erythema.  There is some ecchymosis  noted medially and laterally.  Effusion present.  Mild associated heat.  Neuromuscular vascular exams intact distally.   Neurological: He is alert and oriented to person, place, and time.   Skin: Skin is warm and dry.   Psychiatric: Affect and judgment normal.   Nursing note and vitals reviewed.      LAB RESULTS        I ordered the above labs and reviewed the results    RADIOLOGY  Xr Knee 1 Or 2 View Right    Result Date: 3/9/2020  Narrative: CR Knee 1 or 2 Vws RT INDICATION: Patella surgery January 18. Twisting injury one week ago. Worsening knee pain x1 week. COMPARISON: 1/18/2020 FINDINGS: 2 view(s) of the right knee.  Examination is abnormal. There is a new acute fracture of the patella. Diastases along the fracture line measures up to at least 18 mm. The more inferior of the 2 fixation screws remains in place while the more superior fixation screw is displaced along with the fracture fragment which measures up to 2.2 cm. The findings are best demonstrated on the lateral projection. There is soft tissue swelling. No additional fracture. No bone erosion or destruction.  No foreign body.     Impression: 1. Acute fracture of the proximal third patella with diastases and displacement as described. Signer Name: Solomon Hoyt MD  Signed: 3/9/2020 12:47 AM  Workstation Name: "[employer]  [facility]      I ordered the above radiologic testing and reviewed the results    PROCEDURES  Procedures      PROGRESS AND CONSULTS  ED Course as of Mar 09 0057   Mon Mar 09, 2020   0042 X-rays clearly show that the previously repaired patella fracture has been disrupted.  This was explained to the patient and he was advised to contact Dr. Abraham in the morning.  Patient also advised that he can bear some weight as long as the knee is in an immobilizer.    [ML]      ED Course User Index  [ML] Jhonatan Larsen MD           MEDICAL DECISION MAKING  Results were reviewed/discussed with the patient and they  were also made aware of online access. Pt also made aware that some labs, such as cultures, will not be resulted during ER visit and follow up with PMD is necessary.     MDM       DIAGNOSIS  Final diagnoses:   Other closed fracture of right patella, initial encounter       Latest Documented Vital Signs:  As of 12:57 AM  BP- 163/98 HR- 95 Temp- 98.1 °F (36.7 °C) (Oral) O2 sat- 97%    DISPOSITION  Discharged in stable condition       Medication List      No changes were made to your prescriptions during this visit.       Follow-up Information     Gerald Abraham MD. Call today.    Specialties:  Orthopedic Surgery, Sports Medicine  Contact information:  [address]  [address]  [place] [address] [phone]                      Jhonatan Larsen MD  03/09/20 0057

## 2020-03-09 NOTE — PROGRESS NOTES
CC: Follow-up status post Irrigation and excisional debridement of right grade 1 open patella fracture including skin, subcutaneous tissue, muscle, and bone, Open reduction internal fixation right patella fracture and simple closure right anterior knee transverse laceration 2 cm in length DOS:1/18/20    Interval History: Patient returns to clinic today stating that he had an episode about 10 days ago where he felt some increasing pain to his right knee after slipping, was able to use his brace and continue to be functional but yesterday evening he lost his balance and noted immediate onset of sharp pain and a pop to his right knee.  Since that time he has been unable to straighten his right knee.  He went to the emergency department last evening where x-rays were taken indicating concern for displaced new fracture of his right patella.  He states he is been unable to straighten his leg since this injury.  Localizes pain to the anterior aspect of the knee he rates it as a 7-8 out of 10 with associated swelling.  Denies any redness or warmth to the knee, denies any drainage from his incisions.    Exam:  Right Knee-   Incision mostly healed at this point time with small residual scab at the distal end of the incision, no subcutaneous fluid collection, minimal residual erythema around the incision centrally.  Passive range of motion 0 to 90 degrees, patient is unable to actively extend his knee or maintain a straight leg raise position, active range of motion is 30-80  1+ effusion with ecchymosis over the anterior aspect of the knee  Positive sensation light tough all distributions symmetric to contralateral side  Brisk cap refill all digits  2+ dorsalis pedis pulse    Imaging: Review of x-rays from emergency department from earlier this morning indicate new transverse fracture with significant displacement between 2 prior screws that were securing the longitudinal fracture from previous injury with longitudinal  fracture showing moderate apparent healing at this time.  Significant patella [place] appreciated on lateral view.  Compared to prior postoperative x-rays from office.    Impression: status post Irrigation and excisional debridement of right grade 1 open patella fracture including skin, subcutaneous tissue, muscle, and bone, Open reduction internal fixation right patella fracture and simple closure right anterior knee transverse laceration 2 cm in length     Plan:  It appears the patient has a new transverse fracture of his patella at this point time which is caused a complete deficiency of his extensor mechanism of the right knee.  I explained to him my concerns about this issue in general especially given his poor bone quality, morbid obesity, history of prior infections, and prior history of fracture of this patella.  I discussed with him the importance of us obtaining further imaging so we will get a CT scan to evaluate bone quality and entirety of fracture lines in greater detail for preoperative planning.  I discussed with patient that he will need further surgery in order to stabilize this fracture or proceed with a partial patellectomy and repair the quad tendon to remainder of patella that is intact.  He will likely have to be immobilized with a cast or brace locked in extension for least 4 to 6 weeks following this procedure.  He understood this and had all questions answered.  He understood the risk of recurrence of infection issues.  He will go back into his long brace locked in extension with use of crutches for stabilization until we get results from the CT scan.  Once we obtain these results I will contact him over the phone and will make a plan in regards to proceeding with surgery at that point.

## 2020-03-10 RX ORDER — HYDROMORPHONE HYDROCHLORIDE 2 MG/1
2 TABLET ORAL EVERY 4 HOURS PRN
Qty: 15 TABLET | Refills: 0 | OUTPATIENT
Start: 2020-03-10

## 2020-03-10 NOTE — TELEPHONE ENCOUNTER
Answered questions.  Told patient we are trying to plan on doing surgery on Friday if we can get approval.  Told him I will send in a refill on Dilaudid tomorrow for pain control

## 2020-03-10 NOTE — TELEPHONE ENCOUNTER
Patients wife has called three times to check on surgery for Chaitanya. Wanting to know what we are doing and when. Said I would send you a message and get back to her once I knew something.

## 2020-03-11 ENCOUNTER — TELEPHONE (OUTPATIENT)
Dept: ORTHOPEDIC SURGERY | Facility: CLINIC | Age: 30
End: 2020-03-11

## 2020-03-11 ENCOUNTER — PREP FOR SURGERY (OUTPATIENT)
Dept: OTHER | Facility: HOSPITAL | Age: 30
End: 2020-03-11

## 2020-03-11 DIAGNOSIS — S82.021A CLOSED DISPLACED LONGITUDINAL FRACTURE OF RIGHT PATELLA, INITIAL ENCOUNTER: Primary | ICD-10-CM

## 2020-03-11 DIAGNOSIS — S82.091A OTHER CLOSED FRACTURE OF RIGHT PATELLA, INITIAL ENCOUNTER: Primary | ICD-10-CM

## 2020-03-11 DIAGNOSIS — Z87.81 STATUS POST OPEN REDUCTION WITH INTERNAL FIXATION OF FRACTURE: Primary | ICD-10-CM

## 2020-03-11 DIAGNOSIS — Z98.890 STATUS POST OPEN REDUCTION WITH INTERNAL FIXATION OF FRACTURE: Primary | ICD-10-CM

## 2020-03-11 PROBLEM — S82.001A CLOSED FRACTURE OF RIGHT PATELLA: Status: ACTIVE | Noted: 2020-03-11

## 2020-03-11 PROBLEM — S82.001A RIGHT PATELLA FRACTURE: Status: ACTIVE | Noted: 2020-03-11

## 2020-03-11 RX ORDER — HYDROMORPHONE HYDROCHLORIDE 2 MG/1
2 TABLET ORAL EVERY 4 HOURS PRN
Qty: 15 TABLET | Refills: 0 | Status: SHIPPED | OUTPATIENT
Start: 2020-03-11 | End: 2020-03-15

## 2020-03-11 RX ORDER — MELOXICAM 7.5 MG/1
15 TABLET ORAL ONCE
Status: CANCELLED | OUTPATIENT
Start: 2020-03-11 | End: 2020-03-11

## 2020-03-11 RX ORDER — ACETAMINOPHEN 500 MG
1000 TABLET ORAL ONCE
Status: CANCELLED | OUTPATIENT
Start: 2020-03-11 | End: 2020-03-11

## 2020-03-11 RX ORDER — HYDROMORPHONE HYDROCHLORIDE 2 MG/1
2 TABLET ORAL EVERY 4 HOURS PRN
Qty: 30 TABLET | Refills: 0 | Status: SHIPPED | OUTPATIENT
Start: 2020-03-11 | End: 2020-03-14 | Stop reason: HOSPADM

## 2020-03-11 RX ORDER — PREGABALIN 75 MG/1
150 CAPSULE ORAL ONCE
Status: CANCELLED | OUTPATIENT
Start: 2020-03-11 | End: 2020-03-11

## 2020-03-11 NOTE — TELEPHONE ENCOUNTER
Discussed findings from CT scan at length with patient and his wife indicated comminuted refracture of patella extending into a new region involving more of a transverse component with failure of previously placed screws for fixation of the superior lateral fracture plane noted with prior injury.  I discussed with patient that unfortunately the residual bone quality looks very poor with a small region of the patella involved that is highly comminuted.  At this point time I think his most predictable option would be a partial patellectomy me with removal of hardware and repair of the quadriceps tendon.  I did discuss with him at length that this would require prolonged postoperative immobilization in extension and certainly with his underlying history of MRSA and infections he does have significant risk of wound issues as well.  He understood all this and all questions answered and does wish to proceed with surgery at this time.  We will plan to proceed on Friday as long as we can get everything squared away in regards to insurance and clearance as needed.

## 2020-03-11 NOTE — TELEPHONE ENCOUNTER
What exactly are you doing for his surgery, so I can call his insurance and get things moving? Will need orders in Epic too. Working on clearing time on your schedule Friday for this. thanks

## 2020-03-12 ENCOUNTER — PREP FOR SURGERY (OUTPATIENT)
Dept: OTHER | Facility: HOSPITAL | Age: 30
End: 2020-03-12

## 2020-03-12 ENCOUNTER — ANESTHESIA EVENT (OUTPATIENT)
Dept: PERIOP | Facility: HOSPITAL | Age: 30
End: 2020-03-12

## 2020-03-12 DIAGNOSIS — S82.021A CLOSED DISPLACED LONGITUDINAL FRACTURE OF RIGHT PATELLA, INITIAL ENCOUNTER: Primary | ICD-10-CM

## 2020-03-13 ENCOUNTER — APPOINTMENT (OUTPATIENT)
Dept: GENERAL RADIOLOGY | Facility: HOSPITAL | Age: 30
End: 2020-03-13

## 2020-03-13 ENCOUNTER — ANESTHESIA (OUTPATIENT)
Dept: PERIOP | Facility: HOSPITAL | Age: 30
End: 2020-03-13

## 2020-03-13 ENCOUNTER — HOSPITAL ENCOUNTER (OUTPATIENT)
Facility: HOSPITAL | Age: 30
Discharge: HOME | End: 2020-03-14
Attending: ORTHOPAEDIC SURGERY | Admitting: ORTHOPAEDIC SURGERY

## 2020-03-13 DIAGNOSIS — S82.091A OTHER CLOSED FRACTURE OF RIGHT PATELLA, INITIAL ENCOUNTER: ICD-10-CM

## 2020-03-13 DIAGNOSIS — S82.021A CLOSED DISPLACED LONGITUDINAL FRACTURE OF RIGHT PATELLA, INITIAL ENCOUNTER: ICD-10-CM

## 2020-03-13 LAB
APTT PPP: 35.1 SECONDS (ref 24.3–38.1)
DEPRECATED RDW RBC AUTO: 43.9 FL (ref 37–54)
ERYTHROCYTE [DISTWIDTH] IN BLOOD BY AUTOMATED COUNT: 12.5 % (ref 12.3–15.4)
HCT VFR BLD AUTO: 43.5 % (ref 37.5–51)
HGB BLD-MCNC: 13.7 G/DL (ref 13–17.7)
INR PPP: 1.06 (ref 0.9–1.1)
MCH RBC QN AUTO: 30 PG (ref 26.6–33)
MCHC RBC AUTO-ENTMCNC: 31.5 G/DL (ref 31.5–35.7)
MCV RBC AUTO: 95.4 FL (ref 79–97)
PLATELET # BLD AUTO: 238 10*3/MM3 (ref 140–450)
PMV BLD AUTO: 11.3 FL (ref 6–12)
PROTHROMBIN TIME: 13.5 SECONDS (ref 12.1–15)
RBC # BLD AUTO: 4.56 10*6/MM3 (ref 4.14–5.8)
WBC NRBC COR # BLD: 8.06 10*3/MM3 (ref 3.4–10.8)

## 2020-03-13 PROCEDURE — G0378 HOSPITAL OBSERVATION PER HR: HCPCS

## 2020-03-13 PROCEDURE — 27524 TREAT KNEECAP FRACTURE: CPT | Performed by: ORTHOPAEDIC SURGERY

## 2020-03-13 PROCEDURE — 25010000002 ONDANSETRON PER 1 MG: Performed by: NURSE ANESTHETIST, CERTIFIED REGISTERED

## 2020-03-13 PROCEDURE — 25010000002 PROPOFOL 10 MG/ML EMULSION: Performed by: NURSE ANESTHETIST, CERTIFIED REGISTERED

## 2020-03-13 PROCEDURE — 85027 COMPLETE CBC AUTOMATED: CPT | Performed by: ORTHOPAEDIC SURGERY

## 2020-03-13 PROCEDURE — 94799 UNLISTED PULMONARY SVC/PX: CPT

## 2020-03-13 PROCEDURE — 25010000002 MIDAZOLAM PER 1MG: Performed by: NURSE ANESTHETIST, CERTIFIED REGISTERED

## 2020-03-13 PROCEDURE — 76000 FLUOROSCOPY <1 HR PHYS/QHP: CPT

## 2020-03-13 PROCEDURE — 87205 SMEAR GRAM STAIN: CPT | Performed by: ORTHOPAEDIC SURGERY

## 2020-03-13 PROCEDURE — 25010000002 FENTANYL CITRATE (PF) 100 MCG/2ML SOLUTION: Performed by: NURSE ANESTHETIST, CERTIFIED REGISTERED

## 2020-03-13 PROCEDURE — 25010000002 VANCOMYCIN 1 G RECONSTITUTED SOLUTION 1 EACH VIAL: Performed by: ORTHOPAEDIC SURGERY

## 2020-03-13 PROCEDURE — 87075 CULTR BACTERIA EXCEPT BLOOD: CPT | Performed by: ORTHOPAEDIC SURGERY

## 2020-03-13 PROCEDURE — 25010000002 DEXAMETHASONE PER 1 MG: Performed by: NURSE ANESTHETIST, CERTIFIED REGISTERED

## 2020-03-13 PROCEDURE — 25010000002 ROPIVACAINE PER 1 MG: Performed by: NURSE ANESTHETIST, CERTIFIED REGISTERED

## 2020-03-13 PROCEDURE — 85610 PROTHROMBIN TIME: CPT | Performed by: ORTHOPAEDIC SURGERY

## 2020-03-13 PROCEDURE — 85730 THROMBOPLASTIN TIME PARTIAL: CPT | Performed by: ORTHOPAEDIC SURGERY

## 2020-03-13 PROCEDURE — 87070 CULTURE OTHR SPECIMN AEROBIC: CPT | Performed by: ORTHOPAEDIC SURGERY

## 2020-03-13 PROCEDURE — 73560 X-RAY EXAM OF KNEE 1 OR 2: CPT

## 2020-03-13 DEVICE — SUT FW 5 W .5 CIR CUT NDL 48M AR7211: Type: IMPLANTABLE DEVICE | Site: KNEE | Status: FUNCTIONAL

## 2020-03-13 DEVICE — SUT FW #2 W/TPR NDL 1/2 CIR 38IN 97CM 26.5MM BLU: Type: IMPLANTABLE DEVICE | Site: KNEE | Status: FUNCTIONAL

## 2020-03-13 RX ORDER — PROPOFOL 10 MG/ML
VIAL (ML) INTRAVENOUS AS NEEDED
Status: DISCONTINUED | OUTPATIENT
Start: 2020-03-13 | End: 2020-03-13 | Stop reason: SURG

## 2020-03-13 RX ORDER — ROPIVACAINE HYDROCHLORIDE 5 MG/ML
INJECTION, SOLUTION EPIDURAL; INFILTRATION; PERINEURAL
Status: COMPLETED | OUTPATIENT
Start: 2020-03-13 | End: 2020-03-13

## 2020-03-13 RX ORDER — LIDOCAINE HYDROCHLORIDE 20 MG/ML
INJECTION, SOLUTION INFILTRATION; PERINEURAL AS NEEDED
Status: DISCONTINUED | OUTPATIENT
Start: 2020-03-13 | End: 2020-03-13 | Stop reason: SURG

## 2020-03-13 RX ORDER — SODIUM CHLORIDE 0.9 % (FLUSH) 0.9 %
10 SYRINGE (ML) INJECTION EVERY 12 HOURS SCHEDULED
Status: DISCONTINUED | OUTPATIENT
Start: 2020-03-13 | End: 2020-03-13 | Stop reason: HOSPADM

## 2020-03-13 RX ORDER — MELOXICAM 7.5 MG/1
15 TABLET ORAL ONCE
Status: COMPLETED | OUTPATIENT
Start: 2020-03-13 | End: 2020-03-13

## 2020-03-13 RX ORDER — MIDAZOLAM HYDROCHLORIDE 2 MG/2ML
2 INJECTION, SOLUTION INTRAMUSCULAR; INTRAVENOUS
Status: DISCONTINUED | OUTPATIENT
Start: 2020-03-13 | End: 2020-03-13 | Stop reason: HOSPADM

## 2020-03-13 RX ORDER — FAMOTIDINE 20 MG/1
20 TABLET, FILM COATED ORAL
Status: COMPLETED | OUTPATIENT
Start: 2020-03-13 | End: 2020-03-13

## 2020-03-13 RX ORDER — ONDANSETRON 2 MG/ML
4 INJECTION INTRAMUSCULAR; INTRAVENOUS ONCE AS NEEDED
Status: DISCONTINUED | OUTPATIENT
Start: 2020-03-13 | End: 2020-03-13 | Stop reason: HOSPADM

## 2020-03-13 RX ORDER — DEXAMETHASONE SODIUM PHOSPHATE 4 MG/ML
8 INJECTION, SOLUTION INTRA-ARTICULAR; INTRALESIONAL; INTRAMUSCULAR; INTRAVENOUS; SOFT TISSUE ONCE AS NEEDED
Status: COMPLETED | OUTPATIENT
Start: 2020-03-13 | End: 2020-03-13

## 2020-03-13 RX ORDER — SODIUM CHLORIDE 9 MG/ML
100 INJECTION, SOLUTION INTRAVENOUS CONTINUOUS
Status: DISCONTINUED | OUTPATIENT
Start: 2020-03-13 | End: 2020-03-14 | Stop reason: HOSPADM

## 2020-03-13 RX ORDER — MIDAZOLAM HYDROCHLORIDE 2 MG/2ML
1 INJECTION, SOLUTION INTRAMUSCULAR; INTRAVENOUS
Status: DISCONTINUED | OUTPATIENT
Start: 2020-03-13 | End: 2020-03-13 | Stop reason: HOSPADM

## 2020-03-13 RX ORDER — GLYCOPYRROLATE 0.2 MG/ML
0.2 INJECTION INTRAMUSCULAR; INTRAVENOUS
Status: COMPLETED | OUTPATIENT
Start: 2020-03-13 | End: 2020-03-13

## 2020-03-13 RX ORDER — SODIUM CHLORIDE, SODIUM LACTATE, POTASSIUM CHLORIDE, CALCIUM CHLORIDE 600; 310; 30; 20 MG/100ML; MG/100ML; MG/100ML; MG/100ML
9 INJECTION, SOLUTION INTRAVENOUS CONTINUOUS
Status: DISCONTINUED | OUTPATIENT
Start: 2020-03-13 | End: 2020-03-13

## 2020-03-13 RX ORDER — HYDROMORPHONE HYDROCHLORIDE 2 MG/1
2 TABLET ORAL EVERY 4 HOURS PRN
Status: DISCONTINUED | OUTPATIENT
Start: 2020-03-13 | End: 2020-03-14 | Stop reason: HOSPADM

## 2020-03-13 RX ORDER — DEXMEDETOMIDINE HYDROCHLORIDE 100 UG/ML
INJECTION, SOLUTION INTRAVENOUS AS NEEDED
Status: DISCONTINUED | OUTPATIENT
Start: 2020-03-13 | End: 2020-03-13 | Stop reason: SURG

## 2020-03-13 RX ORDER — SODIUM CHLORIDE, SODIUM LACTATE, POTASSIUM CHLORIDE, CALCIUM CHLORIDE 600; 310; 30; 20 MG/100ML; MG/100ML; MG/100ML; MG/100ML
100 INJECTION, SOLUTION INTRAVENOUS CONTINUOUS
Status: DISCONTINUED | OUTPATIENT
Start: 2020-03-13 | End: 2020-03-13

## 2020-03-13 RX ORDER — PREGABALIN 75 MG/1
150 CAPSULE ORAL ONCE
Status: COMPLETED | OUTPATIENT
Start: 2020-03-13 | End: 2020-03-13

## 2020-03-13 RX ORDER — ONDANSETRON 2 MG/ML
4 INJECTION INTRAMUSCULAR; INTRAVENOUS ONCE AS NEEDED
Status: COMPLETED | OUTPATIENT
Start: 2020-03-13 | End: 2020-03-13

## 2020-03-13 RX ORDER — FENTANYL CITRATE 50 UG/ML
INJECTION, SOLUTION INTRAMUSCULAR; INTRAVENOUS AS NEEDED
Status: DISCONTINUED | OUTPATIENT
Start: 2020-03-13 | End: 2020-03-13 | Stop reason: SURG

## 2020-03-13 RX ORDER — NALOXONE HCL 0.4 MG/ML
0.4 VIAL (ML) INJECTION
Status: DISCONTINUED | OUTPATIENT
Start: 2020-03-13 | End: 2020-03-14 | Stop reason: HOSPADM

## 2020-03-13 RX ORDER — FAMOTIDINE 10 MG/ML
20 INJECTION, SOLUTION INTRAVENOUS
Status: COMPLETED | OUTPATIENT
Start: 2020-03-13 | End: 2020-03-13

## 2020-03-13 RX ORDER — MAGNESIUM HYDROXIDE 1200 MG/15ML
LIQUID ORAL AS NEEDED
Status: DISCONTINUED | OUTPATIENT
Start: 2020-03-13 | End: 2020-03-13 | Stop reason: HOSPADM

## 2020-03-13 RX ORDER — ACETAMINOPHEN 500 MG
1000 TABLET ORAL ONCE
Status: COMPLETED | OUTPATIENT
Start: 2020-03-13 | End: 2020-03-13

## 2020-03-13 RX ORDER — HYDROMORPHONE HYDROCHLORIDE 4 MG/1
4 TABLET ORAL EVERY 4 HOURS PRN
Status: DISCONTINUED | OUTPATIENT
Start: 2020-03-13 | End: 2020-03-14 | Stop reason: HOSPADM

## 2020-03-13 RX ORDER — LIDOCAINE HYDROCHLORIDE 10 MG/ML
0.5 INJECTION, SOLUTION EPIDURAL; INFILTRATION; INTRACAUDAL; PERINEURAL ONCE AS NEEDED
Status: DISCONTINUED | OUTPATIENT
Start: 2020-03-13 | End: 2020-03-13 | Stop reason: HOSPADM

## 2020-03-13 RX ORDER — SODIUM CHLORIDE 0.9 % (FLUSH) 0.9 %
10 SYRINGE (ML) INJECTION AS NEEDED
Status: DISCONTINUED | OUTPATIENT
Start: 2020-03-13 | End: 2020-03-13 | Stop reason: HOSPADM

## 2020-03-13 RX ORDER — CETIRIZINE HYDROCHLORIDE 10 MG/1
10 TABLET ORAL DAILY
Status: DISCONTINUED | OUTPATIENT
Start: 2020-03-13 | End: 2020-03-14 | Stop reason: HOSPADM

## 2020-03-13 RX ORDER — SODIUM CHLORIDE 9 MG/ML
40 INJECTION, SOLUTION INTRAVENOUS AS NEEDED
Status: DISCONTINUED | OUTPATIENT
Start: 2020-03-13 | End: 2020-03-13 | Stop reason: HOSPADM

## 2020-03-13 RX ORDER — KETOROLAC TROMETHAMINE 30 MG/ML
30 INJECTION, SOLUTION INTRAMUSCULAR; INTRAVENOUS EVERY 6 HOURS PRN
Status: DISCONTINUED | OUTPATIENT
Start: 2020-03-13 | End: 2020-03-14 | Stop reason: HOSPADM

## 2020-03-13 RX ORDER — FUROSEMIDE 20 MG/1
20 TABLET ORAL DAILY
Status: DISCONTINUED | OUTPATIENT
Start: 2020-03-13 | End: 2020-03-14 | Stop reason: HOSPADM

## 2020-03-13 RX ADMIN — MIDAZOLAM HYDROCHLORIDE 2 MG: 1 INJECTION, SOLUTION INTRAMUSCULAR; INTRAVENOUS at 11:36

## 2020-03-13 RX ADMIN — PROPOFOL 200 MG: 10 INJECTION, EMULSION INTRAVENOUS at 12:13

## 2020-03-13 RX ADMIN — PREGABALIN 150 MG: 75 CAPSULE ORAL at 10:53

## 2020-03-13 RX ADMIN — VANCOMYCIN HYDROCHLORIDE 3000 MG: 1 INJECTION, POWDER, LYOPHILIZED, FOR SOLUTION INTRAVENOUS at 23:23

## 2020-03-13 RX ADMIN — SODIUM CHLORIDE, POTASSIUM CHLORIDE, SODIUM LACTATE AND CALCIUM CHLORIDE 9 ML/HR: 600; 310; 30; 20 INJECTION, SOLUTION INTRAVENOUS at 09:41

## 2020-03-13 RX ADMIN — LIDOCAINE HYDROCHLORIDE 100 MG: 20 INJECTION, SOLUTION INFILTRATION; PERINEURAL at 12:13

## 2020-03-13 RX ADMIN — ACETAMINOPHEN 1000 MG: 500 TABLET, FILM COATED ORAL at 10:53

## 2020-03-13 RX ADMIN — ROPIVACAINE HYDROCHLORIDE 30 ML: 5 INJECTION, SOLUTION EPIDURAL; INFILTRATION; PERINEURAL at 11:45

## 2020-03-13 RX ADMIN — VANCOMYCIN HYDROCHLORIDE 2750 MG: 1 INJECTION, POWDER, LYOPHILIZED, FOR SOLUTION INTRAVENOUS at 09:41

## 2020-03-13 RX ADMIN — FUROSEMIDE 20 MG: 20 TABLET ORAL at 17:08

## 2020-03-13 RX ADMIN — HYDROMORPHONE HYDROCHLORIDE 4 MG: 4 TABLET ORAL at 17:08

## 2020-03-13 RX ADMIN — SODIUM CHLORIDE 100 ML/HR: 9 INJECTION, SOLUTION INTRAVENOUS at 15:48

## 2020-03-13 RX ADMIN — DEXAMETHASONE SODIUM PHOSPHATE 8 MG: 4 INJECTION, SOLUTION INTRAMUSCULAR; INTRAVENOUS at 10:56

## 2020-03-13 RX ADMIN — HYDROMORPHONE HYDROCHLORIDE 4 MG: 4 TABLET ORAL at 21:19

## 2020-03-13 RX ADMIN — DEXMEDETOMIDINE HYDROCHLORIDE 20 MCG: 100 INJECTION, SOLUTION, CONCENTRATE INTRAVENOUS at 11:40

## 2020-03-13 RX ADMIN — MELOXICAM 15 MG: 7.5 TABLET ORAL at 10:53

## 2020-03-13 RX ADMIN — FENTANYL CITRATE 25 MCG: 50 INJECTION, SOLUTION INTRAMUSCULAR; INTRAVENOUS at 12:10

## 2020-03-13 RX ADMIN — FAMOTIDINE 20 MG: 10 INJECTION INTRAVENOUS at 10:58

## 2020-03-13 RX ADMIN — DEXMEDETOMIDINE HYDROCHLORIDE 20 MCG: 100 INJECTION, SOLUTION, CONCENTRATE INTRAVENOUS at 11:45

## 2020-03-13 RX ADMIN — GLYCOPYRROLATE 0.2 MG: 0.2 INJECTION INTRAMUSCULAR; INTRAVENOUS at 11:00

## 2020-03-13 RX ADMIN — ROPIVACAINE HYDROCHLORIDE 30 ML: 5 INJECTION, SOLUTION EPIDURAL; INFILTRATION; PERINEURAL at 11:42

## 2020-03-13 RX ADMIN — FENTANYL CITRATE 25 MCG: 50 INJECTION, SOLUTION INTRAMUSCULAR; INTRAVENOUS at 12:29

## 2020-03-13 RX ADMIN — ONDANSETRON 4 MG: 2 INJECTION, SOLUTION INTRAMUSCULAR; INTRAVENOUS at 10:58

## 2020-03-13 NOTE — ANESTHESIA PREPROCEDURE EVALUATION
Anesthesia Evaluation     Patient summary reviewed and Nursing notes reviewed   no history of anesthetic complications:  NPO Solid Status: > 8 hours  NPO Liquid Status: > 2 hours           Airway   Mallampati: II  TM distance: >3 FB  Neck ROM: full  No difficulty expected  Dental - normal exam     Pulmonary - normal exam    breath sounds clear to auscultation  (+) a smoker (10 pk yr hx) Current,     ROS comment: snore  Cardiovascular - negative cardio ROS and normal exam  Exercise tolerance: good (4-7 METS)    ECG reviewed  Rhythm: regular  Rate: normal        Neuro/Psych- negative ROS  GI/Hepatic/Renal/Endo    (+) morbid obesity,      Musculoskeletal     (+) back pain (Right patella fracture , non functional),       ROS comment: Right patella fracture  Closed fracture of right patella      Abdominal   (+) obese,    Substance History - negative use     OB/GYN          Other   arthritis,    history of cancer (CML, 6 uears out) remission      Other Comment: Leukocytosis                  Anesthesia Plan    ASA 3     general with block     intravenous induction     Anesthetic plan, all risks, benefits, and alternatives have been provided, discussed and informed consent has been obtained with: patient.  Use of blood products discussed with patient  Consented to blood products.
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
## 2020-03-13 NOTE — ANESTHESIA PROCEDURE NOTES
Peripheral Block      Patient reassessed immediately prior to procedure    Patient location during procedure: pre-op  Start time: 3/13/2020 11:42 AM  Stop time: 3/13/2020 11:46 AM  Preanesthetic Checklist  Completed: patient identified, site marked, surgical consent, pre-op evaluation, timeout performed, IV checked, risks and benefits discussed and monitors and equipment checked  Prep:  Pt Position: supine  Sterile barriers:cap, gloves, mask and sterile barriers  Prep: ChloraPrep  Patient monitoring: blood pressure monitoring, continuous pulse oximetry and EKG  Procedure  Sedation:yes  Performed under: local infiltration  Guidance:landmark technique  Images:still images not obtained    Laterality:right  Block Type:sciatic  Injection Technique:single-shot  Needle Type:short-bevel  Needle Gauge:22 G  Resistance on Injection: none    Medications Used: ropivacaine (NAROPIN) injection 0.5 %, 30 mL  Med admintered at 3/13/2020 11:45 AM      Medications  Comment:20 mcg precidex added to local    Post Assessment  Injection Assessment: negative aspiration for heme, no paresthesia on injection and incremental injection  Patient Tolerance:comfortable throughout block  Complications:no

## 2020-03-13 NOTE — ANESTHESIA PROCEDURE NOTES
Peripheral Block    Pre-sedation assessment completed: 3/13/2020 11:35 AM    Patient reassessed immediately prior to procedure    Start time: 3/13/2020 11:37 AM  Stop time: 3/13/2020 11:41 AM  Reason for block: post-op pain management  Preanesthetic Checklist  Completed: patient identified, site marked, surgical consent, pre-op evaluation, timeout performed, IV checked, risks and benefits discussed and monitors and equipment checked  Prep:  Pt Position: supine  Sterile barriers:cap, gloves, mask and sterile barriers  Prep: ChloraPrep  Patient monitoring: blood pressure monitoring, continuous pulse oximetry and EKG  Procedure  Sedation:yes  Performed under: local infiltration  Guidance:landmark technique  Images:still images not obtained    Laterality:right  Block Type:femoral  Injection Technique:single-shot  Needle Type:short-bevel and echogenic      Medications Used: ropivacaine (NAROPIN) injection 0.5 %, 30 mL  Med admintered at 3/13/2020 11:42 AM      Medications  Comment:20 mcg precidex added to local    Post Assessment  Injection Assessment: negative aspiration for heme, no paresthesia on injection and incremental injection  Patient Tolerance:comfortable throughout block  Complications:no

## 2020-03-13 NOTE — OP NOTE
Date of Operation: 3/13/2020     PREOPERATIVE DIAGNOSIS:   1. Right comminuted and recurrent patella fracture  2.  Retained hardware right patella    POSTOPERATIVE DIAGNOSIS:     1. Right comminuted and recurrent patella fracture  2.  Retained hardware right patella    PROCEDURE PERFORMED:   1.  Removal of hardware right patella  2.  Right partial patellectomy    SURGEON: Gerald Abraham MD     ASSISTANT: Rani Hernandez     ANESTHESIA: General endotracheal anesthesia with regional block.       ESTIMATED BLOOD LOSS: <500ml     URINE OUTPUT: Not recorded.       FLUIDS: Per anesthesia.       COMPLICATIONS: None.       SPECIMENS: Culture swab x1     DRAINS: None.     Tourniquet Times:     Inflated: 3/13/2020 12:30 PM     Deflated: 3/13/2020  1:30 PM        INDICATIONS FOR PROCEDURE: The patient is a pleasant 29 y.o. male with significant history of right open patella fracture back in January treated with irrigation debridement as well as fracture fixation with hardware at that time, patient had a recurrent injury at the end of February when he fell again onto his knee and noted onset of pain.  Comminuted fracture of superior pole of patella as well as failure of prior hardware was noted on CT scan. I discussed treatment options available to the patient and patient wished to proceed with surgical treatment. I explained details of the procedure, as well as the risks, benefits, and alternatives as documented on history and physical, and the patient had all questions answered prior to signing the operative consent form. No guarantees were given in regard to results of the surgery.       DESCRIPTION OF PROCEDURE: The patient was seen, evaluated, and cleared for surgery by anesthesia. Admitted in the preoperative holding area. The operative site was marked, consent was reviewed, history and physical was updated, and preoperative labs were reviewed. A regional block was then placed per anesthesia. The patient was then taken  to the operating room and placed in a supine position on a regular OR table. After successful intubation per anesthesia, nonsterile tourniquet was applied to operative extremity.  All bony prominences were well-padded and patient was secured to the table with a waist strap. The right lower extremity was then sterilely prepped and draped in a standard fashion.       A formal timeout was completed, including confirmation of History and Physical, operative consent, surgical site, patient identification number, and preoperative antibiotic administration.      Attention was then turned to a 10 cm longitudinal incision over the anterior aspect of the right knee utilizing a prior anterior incision and extending it in both proximal and distal directions to allow for greater exposure of the comminuted fracture site.  Fracture site was identified this point time and culture swabs were taken.  Soft tissue debris were sharply excised from the comminuted fracture site and the comminuted fragments of bone from the superior pole patella were sharply excised at this point time as well creating a clean edge to the quadriceps tendon attachment.  Joint was thoroughly irrigated at this point time and residual fragments were excised from lateral retinaculum as well.    Attention was then turned to removal of screws with a screwdriver at this time with a portion of the inferior fracture line previously fixed being healed.    Attention was then turned to debridement of the remaining portion of the patella at its superior edge.  Once this was completed, #5 FiberWire was passed and running locking fashion through the quadriceps tendon.  Drill holes were then made in the patella in longitudinal fashion and suture ends were passed through the drill holes and tied over the inferior pole of patella with 6 alternating half hitches ensuring good loop and knot security.  Suture strands were cut short at this time.  Additional reinforcement closure  was completed with #2 FiberWire closing both medial and lateral retinaculum as well as repairing the redundant quadriceps attachment onto the anterior aspect of the patella at this time.  Thorough irrigation with Betadine lavage followed by normal saline was carried out this point.  Additional closure of the patellar tendon longitudinal incisions for suture passage were closed with 0 Vicryl also burying the knots from the quadriceps tendon repair.    Attention was then turned to closure the wound with running 0 strata fix followed by running 2-0 strata fix for subcutaneous layer.  Skin was then closed with staples.  Wound was dressed with Xeroform gauze, 4 x 4's, ABD pad, web roll, cotton Bangura dressing, 5 x 45 posterior splint and 3 x 35 medial and lateral slabs followed by Ace wrap.     At the end of the procedure, all lap, needle, and sponge counts were correct x2. The patient had brisk capillary refill to all digits of the right lower extremity. Compartments were soft and easily compressible at the end of the procedure.       DISPOSITION: The patient was extubated per anesthesia and taken to the recovery room in stable condition.  Will admit the patient overnight for pain control.  Touchdown weightbearing right lower extremity with no motion of right knee. Will follow up in office in 1 week for wound check and transition to long leg cast in extension.  We will leave patient casted for 3 weeks with follow-up at the 4-week postop ferdinand with me in the office to evaluate for transition to bracing versus additional casting. Results discussed immediately after procedure with family and all questions were answered at that time.

## 2020-03-13 NOTE — ANESTHESIA PROCEDURE NOTES
Airway  Date/Time: 3/13/2020 12:15 PM  Airway not difficult    General Information and Staff    Patient location during procedure: OR    Indications and Patient Condition  Indications for airway management: airway protection    Preoxygenated: yes  MILS maintained throughout  Mask difficulty assessment: 1 - vent by mask    Final Airway Details  Final airway type: supraglottic airway      Successful airway: unique  Size 5    Number of attempts at approach: 1  Assessment: lips, teeth, and gum same as pre-op and atraumatic intubation

## 2020-03-13 NOTE — ANESTHESIA POSTPROCEDURE EVALUATION
Patient: hCaitanya Canales    Procedure Summary     Date:  03/13/20 Room / Location:   [facility] OR 3 /  [facility]    Anesthesia Start:  1208 Anesthesia Stop:  1355    Procedures:       Partial patellectomy (Right Knee)      KNEE HARDWARE REMOVAL (Right ) Diagnosis:       Other closed fracture of right patella, initial encounter      (Other closed fracture of right patella, initial encounter [S82.091A])    Surgeon:  Gerald Abraham MD Provider:  Jose Alfredo Ford CRNA    Anesthesia Type:  general with block ASA Status:  3          Anesthesia Type: general with block    Vitals  Vitals Value Taken Time   /78 3/13/2020  2:45 PM   Temp     Pulse 88 3/13/2020  2:51 PM   Resp 15 3/13/2020  2:45 PM   SpO2 94 % 3/13/2020  2:51 PM   Vitals shown include unvalidated device data.        Post Anesthesia Care and Evaluation    Patient location during evaluation: PACU  Patient participation: complete - patient participated  Level of consciousness: responsive to verbal stimuli and sleepy but conscious  Pain score: 0  Pain management: satisfactory to patient  Airway patency: patent  Anesthetic complications: No anesthetic complications  PONV Status: none  Cardiovascular status: acceptable  Respiratory status: acceptable  Hydration status: acceptable

## 2020-03-14 ENCOUNTER — READMISSION MANAGEMENT (OUTPATIENT)
Dept: CALL CENTER | Facility: HOSPITAL | Age: 30
End: 2020-03-14

## 2020-03-14 VITALS
SYSTOLIC BLOOD PRESSURE: 151 MMHG | OXYGEN SATURATION: 92 % | DIASTOLIC BLOOD PRESSURE: 73 MMHG | RESPIRATION RATE: 14 BRPM | WEIGHT: 315 LBS | BODY MASS INDEX: 44.72 KG/M2 | HEART RATE: 103 BPM | TEMPERATURE: 98.8 F

## 2020-03-14 LAB
ANION GAP SERPL CALCULATED.3IONS-SCNC: 9.8 MMOL/L (ref 5–15)
BUN BLD-MCNC: 16 MG/DL (ref 6–20)
BUN/CREAT SERPL: 22.2 (ref 7–25)
CALCIUM SPEC-SCNC: 8.9 MG/DL (ref 8.6–10.5)
CHLORIDE SERPL-SCNC: 108 MMOL/L (ref 98–107)
CO2 SERPL-SCNC: 25.2 MMOL/L (ref 22–29)
CREAT BLD-MCNC: 0.72 MG/DL (ref 0.76–1.27)
DEPRECATED RDW RBC AUTO: 43.1 FL (ref 37–54)
ERYTHROCYTE [DISTWIDTH] IN BLOOD BY AUTOMATED COUNT: 12.2 % (ref 12.3–15.4)
GFR SERPL CREATININE-BSD FRML MDRD: 127 ML/MIN/1.73
GLUCOSE BLD-MCNC: 152 MG/DL (ref 65–99)
HCT VFR BLD AUTO: 38.9 % (ref 37.5–51)
HGB BLD-MCNC: 12.3 G/DL (ref 13–17.7)
MCH RBC QN AUTO: 30.3 PG (ref 26.6–33)
MCHC RBC AUTO-ENTMCNC: 31.6 G/DL (ref 31.5–35.7)
MCV RBC AUTO: 95.8 FL (ref 79–97)
PLATELET # BLD AUTO: 240 10*3/MM3 (ref 140–450)
PMV BLD AUTO: 11 FL (ref 6–12)
POTASSIUM BLD-SCNC: 4.2 MMOL/L (ref 3.5–5.2)
RBC # BLD AUTO: 4.06 10*6/MM3 (ref 4.14–5.8)
SODIUM BLD-SCNC: 143 MMOL/L (ref 136–145)
WBC NRBC COR # BLD: 15.18 10*3/MM3 (ref 3.4–10.8)

## 2020-03-14 PROCEDURE — G0378 HOSPITAL OBSERVATION PER HR: HCPCS

## 2020-03-14 PROCEDURE — 85027 COMPLETE CBC AUTOMATED: CPT | Performed by: ORTHOPAEDIC SURGERY

## 2020-03-14 PROCEDURE — 80048 BASIC METABOLIC PNL TOTAL CA: CPT | Performed by: ORTHOPAEDIC SURGERY

## 2020-03-14 PROCEDURE — 99024 POSTOP FOLLOW-UP VISIT: CPT | Performed by: ORTHOPAEDIC SURGERY

## 2020-03-14 PROCEDURE — 97161 PT EVAL LOW COMPLEX 20 MIN: CPT

## 2020-03-14 RX ADMIN — APIXABAN 2.5 MG: 2.5 TABLET, FILM COATED ORAL at 08:39

## 2020-03-14 RX ADMIN — SODIUM CHLORIDE 100 ML/HR: 9 INJECTION, SOLUTION INTRAVENOUS at 05:43

## 2020-03-14 RX ADMIN — CETIRIZINE HYDROCHLORIDE 10 MG: 10 TABLET, FILM COATED ORAL at 08:39

## 2020-03-14 RX ADMIN — FUROSEMIDE 20 MG: 20 TABLET ORAL at 08:39

## 2020-03-14 RX ADMIN — HYDROMORPHONE HYDROCHLORIDE 4 MG: 4 TABLET ORAL at 04:29

## 2020-03-14 RX ADMIN — HYDROMORPHONE HYDROCHLORIDE 4 MG: 4 TABLET ORAL at 08:39

## 2020-03-14 NOTE — PLAN OF CARE
Discharge Planning Assessment   Iliana Núñez     Patient Name: Chaitanya Canales  MRN: [id number]  Today's Date: 3/14/2020    Admit Date: 3/13/2020    Discharge Needs Assessment       Row Name 03/14/20 1059       Living Environment    Lives With  spouse    Name(s) of Who Lives With Patient  More wife and their three children    Current Living Arrangements  home/apartment/condo single story house with three steps to gain entry    Duration at Residence  3 years    Potentially Unsafe Housing Conditions  -- none    Primary Care Provided by  self    Provides Primary Care For  no one    Caregiving Concerns  none voiced    Family Caregiver if Needed  spouse    Family Caregiver Names  More    Quality of Family Relationships  unable to assess    Able to Return to Prior Arrangements  yes    Living Arrangement Comments  pt states he lives with his wife and their three children in a single story house with three steps to gain entry       Resource/Environmental Concerns    Resource/Environmental Concerns  none    Transportation Concerns  -- none       Transition Planning    Patient/Family Anticipates Transition to  home with family    Patient/Family Anticipated Services at Transition  none    Transportation Anticipated  car, drives self;family or friend will provide pt states he was still driving prior to his surgery but his wife can provide ride home at discharge.       Discharge Needs Assessment    Readmission Within the Last 30 Days  no previous admission in last 30 days    Concerns to be Addressed  no discharge needs identified;denies needs/concerns at this time    Concerns Comments  none voiced    Equipment Currently Used at Home  crutches    Anticipated Changes Related to Illness  none    Equipment Needed After Discharge  none    Outpatient/Agency/Support Group Needs  -- na    Discharge Facility/Level of Care Needs  -- na    Provided Post Acute Provider List?  Refused    Refused Provider List Comment  pt declines the need for  community resources at this time.    Patient's Choice of Community Agency(s)  none    Current Discharge Risk  -- none    Discharge Coordination/Progress  per pt he plans on returning home at discharge with his wife to help as needed, no needs voiced by patient at this time.            Discharge Plan       Row Name 03/14/20 1103       Plan    Plan  Home with wife    Patient/Family in Agreement with Plan  yes    Plan Comments  Into room and introduced self and role of CM. Discussed discharge disposition with patient at bedside. Patient confirms that the info on his face sheet is correct and that he see's Álvaro Washington as PCP. Patient also states that he uses [facility] in [place] and has no problem picking up his mediations or paying for them. He also states the he does not have a living will and refused informations regarding one. He states that he lives with his wife More and their three children in a single story house with three steps to gain entry and has no problem maneuvering the steps or within the home. Patient states that he is independent with his ADL's and still drove prior to his surgery but his wife will be able to provide ride home at discharge. Patient states he currently has crutches and a BSC from his previous surgery and does not anticipate needing any at discharge. Patient states he was previously been on Eliquis after his first surgery and was able to afford this medications. Patient states he has never used a home care agency in the past and does not think he will need one at discharge. Offered community resources but patient declines the need for them at this time. Patient states he plans on returning home at discharge with his wife to help as needed. Patient had no other questions or concerns regarding discharge plans at this time. CM will continue to follow for needs.            Destination        Coordination has not been started for this encounter.          Durable Medical Equipment         Coordination has not been started for this encounter.          Dialysis/Infusion        Coordination has not been started for this encounter.          Home Medical Care        Coordination has not been started for this encounter.          Therapy        Coordination has not been started for this encounter.          Community Resources        Coordination has not been started for this encounter.            Expected Discharge Date and Time       Expected Discharge Date Expected Discharge Time    Mar 14, 2020             Demographic Summary       Row Name 03/14/20 1059       General Information    Admission Type  observation    Arrived From  home    Referral Source  admission list    Reason for Consult  discharge planning    Preferred Language  English     Used During This Interaction  no       Contact Information    Permission Granted to Share Info With              Functional Status    No documentation.         Psychosocial    No documentation.         Abuse/Neglect    No documentation.         Legal    No documentation.         Substance Abuse    No documentation.         Patient Forms    No documentation.             Kylah Webb RN

## 2020-03-14 NOTE — PLAN OF CARE
Patient will be free of fall   Problem: Patient Care Overview  Goal: Plan of Care Review  Outcome: Ongoing (interventions implemented as appropriate)  Flowsheets (Taken 3/14/2020 0500)  Plan of Care Reviewed With: patient  Goal: Individualization and Mutuality  Outcome: Ongoing (interventions implemented as appropriate)  Goal: Discharge Needs Assessment  Outcome: Ongoing (interventions implemented as appropriate)  Goal: Interprofessional Rounds/Family Conf  Outcome: Ongoing (interventions implemented as appropriate)     Problem: Pain, Chronic (Adult)  Goal: Identify Related Risk Factors and Signs and Symptoms  Outcome: Ongoing (interventions implemented as appropriate)  Goal: Acceptable Pain/Comfort Level and Functional Ability  Outcome: Ongoing (interventions implemented as appropriate)     Problem: Fall Risk (Adult)  Goal: Identify Related Risk Factors and Signs and Symptoms  Outcome: Ongoing (interventions implemented as appropriate)  Goal: Absence of Fall  Outcome: Ongoing (interventions implemented as appropriate)

## 2020-03-14 NOTE — PROGRESS NOTES
Orthopedic Progress Note   Chief Complaint: Status post right partial patellectomy me    Subjective     Interval History: Patient postop day 1 is doing well.  Is afebrile hemodynamically stable.  Pain control oral Dilaudid.          Objective     Vital Signs  Temp:  [95.7 °F (35.4 °C)-98.8 °F (37.1 °C)] 98.8 °F (37.1 °C)  Heart Rate:  [] 103  Resp:  [7-20] 14  BP: (115-170)/(67-98) 151/73  Body mass index is 44.72 kg/m².    Intake/Output Summary (Last 24 hours) at 3/14/2020 0840  Last data filed at 3/14/2020 0751  Gross per 24 hour   Intake 2645 ml   Output 950 ml   Net 1695 ml     I/O this shift:  In: 1605 [I.V.:1605]  Out: -        Physical Exam:   General: patient awake, alert and cooperative   Cardiovascular: regular rhythm and rate   Pulm: clear to auscultation bilaterally   Abdomen: Benign.  Soft bowel sounds   Extremities: Right leg is good distal pulses no motor deficit.  Sensory function is returning.  Patient had a preoperative femoral sciatic nerve block.  Is good capillary refill the dressing is dry.   Neurologic: Normal mood and behavior     Results Review:     I reviewed the patient's new clinical results.      WBC No results found for: WBCS   HGB Hemoglobin   Date Value Ref Range Status   03/14/2020 12.3 (L) 13.0 - 17.7 g/dL Final   03/13/2020 13.7 13.0 - 17.7 g/dL Final      HCT Hematocrit   Date Value Ref Range Status   03/14/2020 38.9 37.5 - 51.0 % Final   03/13/2020 43.5 37.5 - 51.0 % Final      Platlets No results found for: LABPLAT     PT/INR:    Protime   Date Value Ref Range Status   03/13/2020 13.5 12.1 - 15.0 Seconds Final   /  INR   Date Value Ref Range Status   03/13/2020 1.06 0.90 - 1.10 Final       Sodium Sodium   Date Value Ref Range Status   03/14/2020 143 136 - 145 mmol/L Final      Potassium Potassium   Date Value Ref Range Status   03/14/2020 4.2 3.5 - 5.2 mmol/L Final      Chloride Chloride   Date Value Ref Range Status   03/14/2020 108 (H) 98 - 107 mmol/L Final       Bicarbonate No results found for: PLASMABICARB   BUN BUN   Date Value Ref Range Status   03/14/2020 16 6 - 20 mg/dL Final      Creatinine Creatinine   Date Value Ref Range Status   03/14/2020 0.72 (L) 0.76 - 1.27 mg/dL Final      Calcium Calcium   Date Value Ref Range Status   03/14/2020 8.9 8.6 - 10.5 mg/dL Final      Magnesium  AST  ALT  Bilirubin, Total  AlkPhos  Albumin    Amylase  Lipase    Radiology: No results found for: MG  No components found for: AST.*  No components found for: ALT.*  No results found for: BILIRUBIN    No components found for: ALKPHOS.*  No components found for: ALBUMIN.*      No components found for: AMYLASE.*  No components found for: LIPASE.*            Imaging Results (Most Recent)     Procedure Component Value Units Date/Time    FL C Arm During Surgery [[id number]] Resulted:  03/13/20 1350     Updated:  03/13/20 1350    XR Knee 1 or 2 View Right [[id number]] Collected:  03/13/20 1339     Updated:  03/13/20 1343    Narrative:       XR KNEE 1 OR 2 VW RIGHT-: 3/13/2020 12:16 PM     INDICATION:   Intraoperative imaging during removal of patellar hardware.     COMPARISON:   03/09/2020.     FINDINGS:  1 AP view(s) of the right knee.. Image obtained in the OR. The 2 screws  that were present on the patella have been removed.     Fluoroscopy time 3.4 seconds     This report was finalized on 3/13/2020 1:40 PM by Dr. Hal De La Cruz MD.                  sodium chloride 100 mL/hr Last Rate: 100 mL/hr (03/14/20 0751)         Assessment/Plan     Patient Active Problem List   Diagnosis Code   • Chronic myeloid leukemia, BCR/ABL-positive, in remission (CMS/HCC) C92.11   • Therapeutic drug monitoring Z51.81   • Facial cellulitis L03.211   • Displaced longitudinal fracture of right patella S82.021A   • Status post open reduction with internal fixation of patella fracture, 01/18/2020 Z98.890, Z87.81   • Right patella fracture S82.001A   • Closed fracture of right patella S82.001A         Patient pain  controlled with oral Dilaudid.  Has been ambulating nonweightbearing.  Doing well and will discharge home today.  Patient has scheduled follow-up visit in the office next week.  Discharge instructions reviewed with the patient.  Answered all questions.    Everton Schofield MD  03/14/20  08:40          Dictated utilizing Dragon dictation

## 2020-03-14 NOTE — NURSING NOTE
Case Management Discharge Note      Final Note: Discharged home.    Provided Post Acute Provider List?: Refused  Refused Provider List Comment: pt declines the need for community resources at this time.    Destination      No service has been selected for the patient.      Durable Medical Equipment      No service has been selected for the patient.      Dialysis/Infusion      No service has been selected for the patient.      Home Medical Care      No service has been selected for the patient.      Therapy      No service has been selected for the patient.      Community Resources      No service has been selected for the patient.             Final Discharge Disposition Code: 01 - home or self-care
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
## 2020-03-14 NOTE — DISCHARGE SUMMARY
Discharge Summary    Patient name: Chaitanya Canales    Medical record number: [id number]    Admission date: 3/13/2020  Discharge date:  3/14/2020    Attending physician: Gibson    Primary care physician: Álvaro Washington MD    Referring physician:     Consulting physician(s):    Condition on discharge: Stable      Primary Diagnoses: Displaced patella fracture    Secondary Diagnoses:     Operative Procedure: Right partial patellectomy me    Hospital Course: The patient is a very pleasant 29 y.o. male that was admitted to the hospital with displaced right patella fracture.  Patient underwent primary repair of a fracture by Dr. Abraham in January but had a subsequent injury that resulted in displacement of that fracture is admitted to the hospital 13 for right partial patellectomy .  Please see operative note for details.  Postop day 1 the patient is afebrile hemodynamically stable.  Pain control oral medication and is independent nonweightbearing with crutches.    Discharge medications:      Discharge Medications      New Medications      Instructions Start Date   apixaban 2.5 MG tablet tablet  Commonly known as:  ELIQUIS   2.5 mg, Oral, Every 12 Hours Scheduled         Changes to Medications      Instructions Start Date   furosemide 20 MG tablet  Commonly known as:  LASIX  What changed:    · when to take this  · reasons to take this   20 mg, Oral, Daily      HYDROmorphone 2 MG tablet  Commonly known as:  DILAUDID  What changed:  Another medication with the same name was removed. Continue taking this medication, and follow the directions you see here.   2 mg, Oral, Every 4 Hours PRN         Continue These Medications      Instructions Start Date   cetirizine 10 MG tablet  Commonly known as:  zyrTEC   10 mg, Oral      EPINEPHrine 0.3 MG/0.3ML solution auto-injector injection  Commonly known as:  EPIPEN   No dose, route, or frequency recorded.      SPRYCEL 100 MG chemo tablet  Generic drug:  dasatinib   TAKE 1 TABLET BY MOUTH  EVERY DAY             Discharge instructions: Patient is discharged home with Eliquis 2.5 twice daily for DVT prophylaxis.  Patient has Dilaudid 2 mg at home for pain control.  He is to remain strictly nonweightbearing.  Has a follow-up appointment scheduled in the office next Friday.  Activity instructions reviewed with the patient.      Follow-up appointment:

## 2020-03-14 NOTE — PLAN OF CARE
Problem: Patient Care Overview  Goal: Plan of Care Review  Flowsheets (Taken 3/14/2020 0900)  Outcome Summary: Patient is independent with all mobility using axillary crutches and is able to maintain weightbearing restrictions without cues.  Patient reports no concerns in regards to return home.  Skilled PT not indicated at this time.

## 2020-03-15 DIAGNOSIS — Z98.890 STATUS POST OPEN REDUCTION WITH INTERNAL FIXATION OF FRACTURE: Primary | ICD-10-CM

## 2020-03-15 DIAGNOSIS — Z87.81 STATUS POST OPEN REDUCTION WITH INTERNAL FIXATION OF FRACTURE: Primary | ICD-10-CM

## 2020-03-15 RX ORDER — HYDROMORPHONE HYDROCHLORIDE 4 MG/1
4 TABLET ORAL EVERY 4 HOURS PRN
Qty: 42 TABLET | Refills: 0 | Status: SHIPPED | OUTPATIENT
Start: 2020-03-15 | End: 2020-03-23 | Stop reason: SDUPTHER

## 2020-03-15 NOTE — OUTREACH NOTE
Prep Survey      Responses   [facility] facility patient discharged from?  [place]   Is LACE score < 7 ?  Yes   [place]  [facility]   Date of Admission  03/13/20   Date of Discharge  03/14/20   Discharge Disposition  Home or Self Care   Discharge diagnosis  patellectomy   Does the patient have one of the following disease processes/diagnoses(primary or secondary)?  General Surgery   Does the patient have Home health ordered?  No   Is there a DME ordered?  No   Prep survey completed?  Yes          Aye Adkins RN

## 2020-03-17 ENCOUNTER — READMISSION MANAGEMENT (OUTPATIENT)
Dept: CALL CENTER | Facility: HOSPITAL | Age: 30
End: 2020-03-17

## 2020-03-17 LAB
BACTERIA SPEC AEROBE CULT: NORMAL
GRAM STN SPEC: NORMAL
GRAM STN SPEC: NORMAL

## 2020-03-17 NOTE — OUTREACH NOTE
LAG < 7 Survey      Responses   [facility] patient discharged from?  [place]   Does the patient have one of the following disease processes/diagnoses(primary or secondary)?  General Surgery   Is there a successful TCM telephone encounter documented?  No   BHLAG <7 Attempt successful?  Yes   Call start time  1609   Call end time  1612   Discharge diagnosis  patellectomy   Meds reviewed with patient/caregiver?  Yes   Is the patient having any side effects they believe may be caused by any medication additions or changes?  No   Does the patient have all medications ordered at discharge?  Yes   Is the patient taking all medications as directed (includes completed medication regime)?  Yes   Does the patient have a primary care provider?   Yes   Does the patient have an appointment with their PCP within 7 days of discharge?  N/A   Has the patient kept scheduled appointments due by today?  Yes   Has home health visited the patient within 72 hours of discharge?  N/A   Psychosocial issues?  No   Did the patient receive a copy of their discharge instructions?  Yes   Nursing interventions  Reviewed instructions with patient   What is the patient's perception of their health status since discharge?  Improving   Is the patient/caregiver able to teach back signs and symptoms related to disease process for when to call PCP?  Yes   Is the patient/caregiver able to teach back signs and symptoms related to disease process for when to call 911?  Yes   Is the patient/caregiver able to teach back the hierarchy of who to call/visit for symptoms/problems? PCP, Specialist, Home health nurse, Urgent Care, ED, 911  Yes   Additional teach back comments  He is to have a hard cast placed this week.  He is doing well.   Graduated  Yes          Tequila Wood RN

## 2020-03-18 LAB — BACTERIA SPEC ANAEROBE CULT: NORMAL

## 2020-03-20 ENCOUNTER — OFFICE VISIT (OUTPATIENT)
Dept: ORTHOPEDIC SURGERY | Facility: CLINIC | Age: 30
End: 2020-03-20

## 2020-03-20 DIAGNOSIS — Z09 S/P ORTHOPEDIC SURGERY, FOLLOW-UP EXAM: ICD-10-CM

## 2020-03-20 DIAGNOSIS — Z98.890 STATUS POST HARDWARE REMOVAL: Primary | ICD-10-CM

## 2020-03-20 PROCEDURE — 99024 POSTOP FOLLOW-UP VISIT: CPT | Performed by: NURSE PRACTITIONER

## 2020-03-22 PROBLEM — Z09 S/P ORTHOPEDIC SURGERY, FOLLOW-UP EXAM: Status: ACTIVE | Noted: 2020-03-22

## 2020-03-22 NOTE — PROGRESS NOTES
CC: follow-up removal of hardware right patella, right patella partial patellaectomy, DOS 03/13/2020    Interval history: Chaitanya Canales returns to clinic for follow-up right lower extremity.  Has continued touchdown weightbearing only right lower extremity with splint intact.  Has continued with toe range of motion as instructed. Has continued ambulating with crutches.  Eliquis twice daily DVT prophylaxis.    Exam:  Right lower extremity examined  Positive edema to the patient lower extremity  Moderate swelling present at knees  Staples clean dry and intact  All compartments soft easily compressible  Negative Homans sign, negative calf tenderness bilaterally  1+ dorsalis pedis pulse  Flex extend all digits right foot    Impression: status post hardware removal right patella, partial patellaectomy    Plan:  1.  Discussed plan of care with patient.  Fitted with splint right lower extremity, Xeroform gauze applied over staples, covered with sterile 4 x 4's.  We will plan to see him back in clinic in 1 week for staple removal and full leg cast placement right lower extremity.  Encouraged to continue with elevation, rest, toe range of motion, touchdown weightbearing with crutches and continue Eliquis for DVT prophylaxis.  He verbalized understanding of all information agrees with plan of care.  Denies other concerns present this time.

## 2020-03-23 DIAGNOSIS — Z87.81 STATUS POST OPEN REDUCTION WITH INTERNAL FIXATION OF FRACTURE: ICD-10-CM

## 2020-03-23 DIAGNOSIS — Z98.890 STATUS POST OPEN REDUCTION WITH INTERNAL FIXATION OF FRACTURE: ICD-10-CM

## 2020-03-23 RX ORDER — HYDROMORPHONE HYDROCHLORIDE 4 MG/1
4 TABLET ORAL EVERY 4 HOURS PRN
Qty: 42 TABLET | Refills: 0 | Status: SHIPPED | OUTPATIENT
Start: 2020-03-23 | End: 2020-03-31 | Stop reason: SDUPTHER

## 2020-03-27 ENCOUNTER — OFFICE VISIT (OUTPATIENT)
Dept: ORTHOPEDIC SURGERY | Facility: CLINIC | Age: 30
End: 2020-03-27

## 2020-03-27 VITALS — BODY MASS INDEX: 36.45 KG/M2 | HEIGHT: 78 IN | WEIGHT: 315 LBS

## 2020-03-27 DIAGNOSIS — Z09 S/P ORTHOPEDIC SURGERY, FOLLOW-UP EXAM: ICD-10-CM

## 2020-03-27 DIAGNOSIS — Z98.890 STATUS POST HARDWARE REMOVAL: Primary | ICD-10-CM

## 2020-03-27 PROCEDURE — 99024 POSTOP FOLLOW-UP VISIT: CPT | Performed by: NURSE PRACTITIONER

## 2020-03-27 NOTE — PROGRESS NOTES
CC: follow-up removal of hardware right patella, right patella partial patellaectomy, DOS 03/13/2020    Interval history: Chaitanya Canales returns to clinic for follow-up right lower extremity.  Has continued with touchdown weightbearing right lower extremity with splint as well as crutches.  He has noted irritation at the lateral aspect of the foot fifth digit from splint to sliding.  Has continued Eliquis twice daily for DVT prophylaxis.  Denies presence of numbness or tingling right lower extremity.  Denies any presence of pain calf.  Denies other concerns present this time.    Exam:  Right lower extremity examined out of splint  Mild erythema noted midshaft fifth digit no open wound; appears to be friction rub from splint sliding  Mild swelling anterior aspect of the knee  Staples clean dry and intact  All compartments soft easily compressible  Negative Homans sign, negative calf tenderness bilaterally  1+ dorsalis pedis pulse  Flex extend all digits right foot, brisk cap refill all digits right foot  Positive sensation light touch all distributions right lower extremity    Impression: status post hardware removal right patella, partial patellaectomy      Plan:  1.  Discussed plan of care with patient.  Staples removed Steri-Strips were placed fitted with long cast right lower extremity, extra padding around heel and edges with use of crutches.  Encouraged to rest elevate continue with toe range of motion touchdown weightbearing with crutches continue Eliquis for DVT prophylaxis.  We will have him follow-up with Dr. Abraham in 2 weeks x-ray imaging out of cast.  Encouraged to call with any questions concerns he has between now on follow-up.  He verbalized understanding of information agrees with plan of care.  Denies other concerns present time.

## 2020-03-31 DIAGNOSIS — Z98.890 STATUS POST OPEN REDUCTION WITH INTERNAL FIXATION OF FRACTURE: ICD-10-CM

## 2020-03-31 DIAGNOSIS — Z87.81 STATUS POST OPEN REDUCTION WITH INTERNAL FIXATION OF FRACTURE: ICD-10-CM

## 2020-03-31 RX ORDER — HYDROMORPHONE HYDROCHLORIDE 4 MG/1
4 TABLET ORAL EVERY 4 HOURS PRN
Qty: 42 TABLET | Refills: 0 | Status: SHIPPED | OUTPATIENT
Start: 2020-03-31 | End: 2020-04-13

## 2020-04-07 ENCOUNTER — OFFICE VISIT (OUTPATIENT)
Dept: ORTHOPEDIC SURGERY | Facility: CLINIC | Age: 30
End: 2020-04-07

## 2020-04-07 VITALS — WEIGHT: 315 LBS | HEIGHT: 78 IN | BODY MASS INDEX: 36.45 KG/M2

## 2020-04-07 DIAGNOSIS — Z98.890 STATUS POST OPEN REDUCTION WITH INTERNAL FIXATION OF FRACTURE: Primary | ICD-10-CM

## 2020-04-07 DIAGNOSIS — Z98.890 STATUS POST HARDWARE REMOVAL: ICD-10-CM

## 2020-04-07 DIAGNOSIS — Z09 S/P ORTHOPEDIC SURGERY, FOLLOW-UP EXAM: ICD-10-CM

## 2020-04-07 DIAGNOSIS — Z87.81 STATUS POST OPEN REDUCTION WITH INTERNAL FIXATION OF FRACTURE: Primary | ICD-10-CM

## 2020-04-07 PROCEDURE — 73560 X-RAY EXAM OF KNEE 1 OR 2: CPT | Performed by: ORTHOPAEDIC SURGERY

## 2020-04-07 PROCEDURE — 99024 POSTOP FOLLOW-UP VISIT: CPT | Performed by: ORTHOPAEDIC SURGERY

## 2020-04-07 RX ORDER — FUROSEMIDE 40 MG/1
TABLET ORAL
COMMUNITY
Start: 2020-03-31 | End: 2020-07-04

## 2020-04-07 RX ORDER — DOXYCYCLINE HYCLATE 100 MG/1
100 TABLET, DELAYED RELEASE ORAL 2 TIMES DAILY
Qty: 20 TABLET | Refills: 0 | Status: SHIPPED | OUTPATIENT
Start: 2020-04-07 | End: 2020-04-17

## 2020-04-07 RX ORDER — DOXYCYCLINE HYCLATE 100 MG/1
100 TABLET, DELAYED RELEASE ORAL 2 TIMES DAILY
Qty: 20 TABLET | Refills: 0 | Status: SHIPPED | OUTPATIENT
Start: 2020-04-07 | End: 2020-04-07 | Stop reason: SDUPTHER

## 2020-04-07 RX ORDER — HYDROCODONE BITARTRATE AND ACETAMINOPHEN 5; 325 MG/1; MG/1
1 TABLET ORAL EVERY 4 HOURS PRN
Qty: 60 TABLET | Refills: 0 | Status: SHIPPED | OUTPATIENT
Start: 2020-04-07 | End: 2020-04-13

## 2020-04-07 NOTE — PROGRESS NOTES
Cc: Follow-up status post right partial patellectomy and hardware removal, 3/13/2020    Interval history: Patient returns to clinic today stating doing fairly well at this point time, tolerating cast well.  Mild residual pain rates as a 3-4 out of 10 and aching in nature over the anterior knee.  He has been fully weightbearing with use of crutches and cast shoe.  Denies any fevers chills or sweats.    Exam:  Right knee-incision healed well both proximal and distal, central region where he has had previous issues with a small area less than 2 cm with mild serous drainage and small region of surrounding erythema appears to be consistent with a ruptured stitch abscess   Tolerates passive range of motion of knee from 0 to 40 degrees, he is able to maintain straight leg raise position   Flex and extend toes and ankle   Brisk cap refill all digits   Positive sensation light touch all distributions right foot   No calf pain, negative Homans sign      Imagin views right knee from today's visit, ordered and reviewed by me, indication status post partial patellectomy, compared to preoperative x-rays, indicate acceptable alignment of patella with mild patella baja noted but evidence of soft tissue shadow extending all the way down to the superior pole of the patella, no evidence of reactive bone formation.    Impression: Status post right partial patellectomy     Plan:  1.  Staples removed today, Steri-Strips placed  2.  Placed patient in hinged knee ACL brace.  Told him he has to be very diligent about wearing this all the time and keep of the knee locked in extension except for exercises with physical therapy and exercises a couple times a day at home.  We are going to go very slow on his rehab protocol given his poor tissue healing and history of reinjury.  3.  I have asked him to clean the wound over his incision twice daily with peroxide.  Prescription for doxycycline given today.  Prescription for hydrocodone for  pain.  4.  Referral to physical therapy.  We will use quad tendon protocol for him but we will modify significantly, range of motion goals are 0 to 60 degrees until 6 weeks, 0 to 90 degrees until 8 weeks, and 0 to 120 degrees until 10 weeks.  He can do quad firing in an extended position but I do not want him doing any straight leg raises until 6-8 weeks.

## 2020-04-08 ENCOUNTER — APPOINTMENT (OUTPATIENT)
Dept: PHYSICAL THERAPY | Facility: HOSPITAL | Age: 30
End: 2020-04-08

## 2020-04-09 ENCOUNTER — APPOINTMENT (OUTPATIENT)
Dept: ONCOLOGY | Facility: HOSPITAL | Age: 30
End: 2020-04-09

## 2020-04-09 ENCOUNTER — OFFICE VISIT (OUTPATIENT)
Dept: ONCOLOGY | Facility: CLINIC | Age: 30
End: 2020-04-09

## 2020-04-09 ENCOUNTER — APPOINTMENT (OUTPATIENT)
Dept: LAB | Facility: HOSPITAL | Age: 30
End: 2020-04-09

## 2020-04-09 ENCOUNTER — LAB (OUTPATIENT)
Dept: LAB | Facility: HOSPITAL | Age: 30
End: 2020-04-09

## 2020-04-09 DIAGNOSIS — C92.11 CHRONIC MYELOID LEUKEMIA, BCR/ABL-POSITIVE, IN REMISSION (HCC): Primary | ICD-10-CM

## 2020-04-09 DIAGNOSIS — C92.11 CHRONIC MYELOID LEUKEMIA, BCR/ABL-POSITIVE, IN REMISSION (HCC): ICD-10-CM

## 2020-04-09 LAB
BASOPHILS # BLD AUTO: 0.06 10*3/MM3 (ref 0–0.2)
BASOPHILS NFR BLD AUTO: 0.7 % (ref 0–1.5)
DEPRECATED RDW RBC AUTO: 42.5 FL (ref 37–54)
EOSINOPHIL # BLD AUTO: 0.54 10*3/MM3 (ref 0–0.4)
EOSINOPHIL NFR BLD AUTO: 6 % (ref 0.3–6.2)
ERYTHROCYTE [DISTWIDTH] IN BLOOD BY AUTOMATED COUNT: 12.8 % (ref 12.3–15.4)
HCT VFR BLD AUTO: 43.8 % (ref 37.5–51)
HGB BLD-MCNC: 14.5 G/DL (ref 13–17.7)
IMM GRANULOCYTES # BLD AUTO: 0.19 10*3/MM3 (ref 0–0.05)
IMM GRANULOCYTES NFR BLD AUTO: 2.1 % (ref 0–0.5)
LYMPHOCYTES # BLD AUTO: 2.16 10*3/MM3 (ref 0.7–3.1)
LYMPHOCYTES NFR BLD AUTO: 24.1 % (ref 19.6–45.3)
MCH RBC QN AUTO: 30.2 PG (ref 26.6–33)
MCHC RBC AUTO-ENTMCNC: 33.1 G/DL (ref 31.5–35.7)
MCV RBC AUTO: 91.3 FL (ref 79–97)
MONOCYTES # BLD AUTO: 0.68 10*3/MM3 (ref 0.1–0.9)
MONOCYTES NFR BLD AUTO: 7.6 % (ref 5–12)
NEUTROPHILS # BLD AUTO: 5.35 10*3/MM3 (ref 1.7–7)
NEUTROPHILS NFR BLD AUTO: 59.5 % (ref 42.7–76)
NRBC BLD AUTO-RTO: 0 /100 WBC (ref 0–0.2)
PLATELET # BLD AUTO: 248 10*3/MM3 (ref 140–450)
PMV BLD AUTO: 11.6 FL (ref 6–12)
RBC # BLD AUTO: 4.8 10*6/MM3 (ref 4.14–5.8)
WBC NRBC COR # BLD: 8.98 10*3/MM3 (ref 3.4–10.8)

## 2020-04-09 PROCEDURE — 85025 COMPLETE CBC W/AUTO DIFF WBC: CPT

## 2020-04-09 PROCEDURE — 99212-NC PR NO CHARGE CBC OFFICE OUTPATIENT VISIT 10 MINUTES: Performed by: NURSE PRACTITIONER

## 2020-04-09 PROCEDURE — 36415 COLL VENOUS BLD VENIPUNCTURE: CPT

## 2020-04-09 NOTE — PROGRESS NOTES
Patient here for 3-month lab review:    Results from last 7 days   Lab Units 04/09/20  1115   WBC 10*3/mm3 8.98   NEUTROS ABS 10*3/mm3 5.35   HEMOGLOBIN g/dL 14.5   HEMATOCRIT % 43.8   PLATELETS 10*3/mm3 248             Patient admittedly has had a recent repeated right knee surgery and has been holding his Sprycel x2 weeks as he feels it affects wound healing.  He has orthopedic follow-up 5/5/2020.  However he does plan to restart the Sprycel he states within the next week.    He denies further concerns today.

## 2020-04-10 ENCOUNTER — HOSPITAL ENCOUNTER (OUTPATIENT)
Dept: PHYSICAL THERAPY | Facility: HOSPITAL | Age: 30
Setting detail: THERAPIES SERIES
Discharge: HOME | End: 2020-04-10

## 2020-04-10 DIAGNOSIS — Z09 S/P ORTHOPEDIC SURGERY, FOLLOW-UP EXAM: ICD-10-CM

## 2020-04-10 DIAGNOSIS — Z98.890 STATUS POST HARDWARE REMOVAL: Primary | ICD-10-CM

## 2020-04-10 PROCEDURE — 97161 PT EVAL LOW COMPLEX 20 MIN: CPT | Performed by: PHYSICAL THERAPIST

## 2020-04-10 PROCEDURE — 97110 THERAPEUTIC EXERCISES: CPT | Performed by: PHYSICAL THERAPIST
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
## 2020-04-13 ENCOUNTER — TELEPHONE (OUTPATIENT)
Dept: ORTHOPEDIC SURGERY | Facility: CLINIC | Age: 30
End: 2020-04-13

## 2020-04-13 DIAGNOSIS — Z87.81 STATUS POST OPEN REDUCTION WITH INTERNAL FIXATION OF FRACTURE: Primary | ICD-10-CM

## 2020-04-13 DIAGNOSIS — Z98.890 STATUS POST OPEN REDUCTION WITH INTERNAL FIXATION OF FRACTURE: Primary | ICD-10-CM

## 2020-04-13 RX ORDER — OXYCODONE HYDROCHLORIDE AND ACETAMINOPHEN 5; 325 MG/1; MG/1
1 TABLET ORAL EVERY 4 HOURS PRN
Qty: 42 TABLET | Refills: 0 | Status: SHIPPED | OUTPATIENT
Start: 2020-04-13 | End: 2020-04-20 | Stop reason: SDUPTHER

## 2020-04-13 NOTE — TELEPHONE ENCOUNTER
Hydrocodone is not helping, he has been taking 2 at a time and it just is not working.  Needs something else for pain.

## 2020-04-14 ENCOUNTER — HOSPITAL ENCOUNTER (OUTPATIENT)
Dept: PHYSICAL THERAPY | Facility: HOSPITAL | Age: 30
Setting detail: THERAPIES SERIES
Discharge: HOME | End: 2020-04-14

## 2020-04-14 DIAGNOSIS — Z98.890 STATUS POST HARDWARE REMOVAL: Primary | ICD-10-CM

## 2020-04-14 PROCEDURE — 97110 THERAPEUTIC EXERCISES: CPT | Performed by: PHYSICAL THERAPIST
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
## 2020-04-20 DIAGNOSIS — Z87.81 STATUS POST OPEN REDUCTION WITH INTERNAL FIXATION OF FRACTURE: ICD-10-CM

## 2020-04-20 DIAGNOSIS — Z98.890 STATUS POST OPEN REDUCTION WITH INTERNAL FIXATION OF FRACTURE: ICD-10-CM

## 2020-04-21 RX ORDER — OXYCODONE HYDROCHLORIDE AND ACETAMINOPHEN 5; 325 MG/1; MG/1
1 TABLET ORAL EVERY 4 HOURS PRN
Qty: 42 TABLET | Refills: 0 | Status: SHIPPED | OUTPATIENT
Start: 2020-04-21 | End: 2020-04-25 | Stop reason: SDUPTHER

## 2020-04-24 ENCOUNTER — HOSPITAL ENCOUNTER (OUTPATIENT)
Dept: PHYSICAL THERAPY | Facility: HOSPITAL | Age: 30
Setting detail: THERAPIES SERIES
Discharge: HOME | End: 2020-04-24

## 2020-04-24 ENCOUNTER — TELEPHONE (OUTPATIENT)
Dept: ORTHOPEDIC SURGERY | Facility: CLINIC | Age: 30
End: 2020-04-24

## 2020-04-24 DIAGNOSIS — Z09 S/P ORTHOPEDIC SURGERY, FOLLOW-UP EXAM: ICD-10-CM

## 2020-04-24 DIAGNOSIS — Z98.890 STATUS POST HARDWARE REMOVAL: Primary | ICD-10-CM

## 2020-04-24 DIAGNOSIS — Z98.890 STATUS POST OPEN REDUCTION WITH INTERNAL FIXATION OF FRACTURE: Primary | ICD-10-CM

## 2020-04-24 DIAGNOSIS — Z87.81 STATUS POST OPEN REDUCTION WITH INTERNAL FIXATION OF FRACTURE: Primary | ICD-10-CM

## 2020-04-24 PROCEDURE — 97110 THERAPEUTIC EXERCISES: CPT

## 2020-04-24 RX ORDER — GABAPENTIN 100 MG/1
100 CAPSULE ORAL 3 TIMES DAILY
Qty: 90 CAPSULE | Refills: 0 | Status: SHIPPED | OUTPATIENT
Start: 2020-04-24 | End: 2020-07-04
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
## 2020-04-24 NOTE — TELEPHONE ENCOUNTER
In the past couple of days, Percocet hasnt been helping,  He has tried taking 1 every 4 hours and also tried 2 every 4 hours and nothing has worked.  He has been having PT, he had it this morning and since then he has been having burning pain.   He is taking Flexeril at night only to help him sleep.   Should he increase the Flexeril?  He has been using ice.

## 2020-04-25 ENCOUNTER — HOSPITAL ENCOUNTER (EMERGENCY)
Facility: HOSPITAL | Age: 30
Discharge: LEFT BEFORE DISCHARGE | End: 2020-04-25

## 2020-04-25 VITALS
RESPIRATION RATE: 18 BRPM | TEMPERATURE: 98.8 F | OXYGEN SATURATION: 100 % | HEIGHT: 78 IN | WEIGHT: 315 LBS | HEART RATE: 95 BPM | DIASTOLIC BLOOD PRESSURE: 108 MMHG | SYSTOLIC BLOOD PRESSURE: 158 MMHG | BODY MASS INDEX: 36.45 KG/M2

## 2020-04-25 DIAGNOSIS — Z98.890 STATUS POST OPEN REDUCTION WITH INTERNAL FIXATION OF FRACTURE: ICD-10-CM

## 2020-04-25 DIAGNOSIS — Z87.81 STATUS POST OPEN REDUCTION WITH INTERNAL FIXATION OF FRACTURE: ICD-10-CM

## 2020-04-25 PROCEDURE — 99211 OFF/OP EST MAY X REQ PHY/QHP: CPT

## 2020-04-25 RX ORDER — OXYCODONE HYDROCHLORIDE AND ACETAMINOPHEN 5; 325 MG/1; MG/1
1 TABLET ORAL EVERY 4 HOURS PRN
Qty: 18 TABLET | Refills: 0 | Status: SHIPPED | OUTPATIENT
Start: 2020-04-25 | End: 2020-04-28 | Stop reason: SDUPTHER

## 2020-04-25 RX ORDER — METHYLPREDNISOLONE 4 MG/1
TABLET ORAL
Qty: 21 TABLET | Refills: 0 | Status: SHIPPED | OUTPATIENT
Start: 2020-04-25 | End: 2020-05-12

## 2020-04-28 DIAGNOSIS — Z98.890 STATUS POST OPEN REDUCTION WITH INTERNAL FIXATION OF FRACTURE: ICD-10-CM

## 2020-04-28 DIAGNOSIS — Z87.81 STATUS POST OPEN REDUCTION WITH INTERNAL FIXATION OF FRACTURE: ICD-10-CM

## 2020-04-28 RX ORDER — OXYCODONE HYDROCHLORIDE AND ACETAMINOPHEN 5; 325 MG/1; MG/1
1 TABLET ORAL EVERY 4 HOURS PRN
Qty: 18 TABLET | Refills: 0 | Status: SHIPPED | OUTPATIENT
Start: 2020-04-28 | End: 2020-05-01 | Stop reason: SDUPTHER

## 2020-05-01 DIAGNOSIS — Z98.890 STATUS POST OPEN REDUCTION WITH INTERNAL FIXATION OF FRACTURE: ICD-10-CM

## 2020-05-01 DIAGNOSIS — Z87.81 STATUS POST OPEN REDUCTION WITH INTERNAL FIXATION OF FRACTURE: ICD-10-CM

## 2020-05-01 RX ORDER — OXYCODONE HYDROCHLORIDE AND ACETAMINOPHEN 5; 325 MG/1; MG/1
1 TABLET ORAL EVERY 4 HOURS PRN
Qty: 18 TABLET | Refills: 0 | Status: SHIPPED | OUTPATIENT
Start: 2020-05-01 | End: 2020-05-03 | Stop reason: SDUPTHER

## 2020-05-03 DIAGNOSIS — Z98.890 STATUS POST OPEN REDUCTION WITH INTERNAL FIXATION OF FRACTURE: ICD-10-CM

## 2020-05-03 DIAGNOSIS — Z87.81 STATUS POST OPEN REDUCTION WITH INTERNAL FIXATION OF FRACTURE: ICD-10-CM

## 2020-05-04 DIAGNOSIS — Z98.890 STATUS POST OPEN REDUCTION WITH INTERNAL FIXATION OF FRACTURE: ICD-10-CM

## 2020-05-04 DIAGNOSIS — Z87.81 STATUS POST OPEN REDUCTION WITH INTERNAL FIXATION OF FRACTURE: ICD-10-CM

## 2020-05-04 RX ORDER — OXYCODONE HYDROCHLORIDE AND ACETAMINOPHEN 5; 325 MG/1; MG/1
1 TABLET ORAL EVERY 4 HOURS PRN
Qty: 18 TABLET | Refills: 0 | Status: SHIPPED | OUTPATIENT
Start: 2020-05-04 | End: 2020-05-05 | Stop reason: SDUPTHER

## 2020-05-04 RX ORDER — OXYCODONE HYDROCHLORIDE AND ACETAMINOPHEN 5; 325 MG/1; MG/1
1 TABLET ORAL EVERY 4 HOURS PRN
Qty: 18 TABLET | Refills: 0 | OUTPATIENT
Start: 2020-05-04

## 2020-05-05 DIAGNOSIS — Z98.890 STATUS POST OPEN REDUCTION WITH INTERNAL FIXATION OF FRACTURE: ICD-10-CM

## 2020-05-05 DIAGNOSIS — Z87.81 STATUS POST OPEN REDUCTION WITH INTERNAL FIXATION OF FRACTURE: ICD-10-CM

## 2020-05-06 DIAGNOSIS — Z87.81 STATUS POST OPEN REDUCTION WITH INTERNAL FIXATION OF FRACTURE: ICD-10-CM

## 2020-05-06 DIAGNOSIS — Z98.890 STATUS POST OPEN REDUCTION WITH INTERNAL FIXATION OF FRACTURE: ICD-10-CM

## 2020-05-06 RX ORDER — OXYCODONE HYDROCHLORIDE AND ACETAMINOPHEN 5; 325 MG/1; MG/1
1 TABLET ORAL EVERY 4 HOURS PRN
Qty: 18 TABLET | Refills: 0 | Status: SHIPPED | OUTPATIENT
Start: 2020-05-06 | End: 2020-05-11 | Stop reason: SDUPTHER

## 2020-05-06 RX ORDER — OXYCODONE HYDROCHLORIDE AND ACETAMINOPHEN 5; 325 MG/1; MG/1
1 TABLET ORAL EVERY 4 HOURS PRN
Qty: 18 TABLET | Refills: 0 | OUTPATIENT
Start: 2020-05-06

## 2020-05-11 DIAGNOSIS — Z87.81 STATUS POST OPEN REDUCTION WITH INTERNAL FIXATION OF FRACTURE: ICD-10-CM

## 2020-05-11 DIAGNOSIS — Z98.890 STATUS POST OPEN REDUCTION WITH INTERNAL FIXATION OF FRACTURE: ICD-10-CM

## 2020-05-11 RX ORDER — OXYCODONE HYDROCHLORIDE AND ACETAMINOPHEN 5; 325 MG/1; MG/1
1 TABLET ORAL EVERY 4 HOURS PRN
Qty: 18 TABLET | Refills: 0 | Status: SHIPPED | OUTPATIENT
Start: 2020-05-11 | End: 2020-07-04

## 2020-05-11 RX ORDER — OXYCODONE HYDROCHLORIDE AND ACETAMINOPHEN 5; 325 MG/1; MG/1
1 TABLET ORAL EVERY 4 HOURS PRN
Qty: 18 TABLET | Refills: 0 | OUTPATIENT
Start: 2020-05-11

## 2020-05-11 NOTE — TELEPHONE ENCOUNTER
[facility] refill request rec-pt called requesting fill be sent to [facility]. Per last office note-pt is to continue. Request approved.

## 2020-05-12 ENCOUNTER — OFFICE VISIT (OUTPATIENT)
Dept: ORTHOPEDIC SURGERY | Facility: CLINIC | Age: 30
End: 2020-05-12

## 2020-05-12 DIAGNOSIS — Z09 S/P ORTHOPEDIC SURGERY, FOLLOW-UP EXAM: Primary | ICD-10-CM

## 2020-05-12 PROCEDURE — 99024 POSTOP FOLLOW-UP VISIT: CPT | Performed by: ORTHOPAEDIC SURGERY

## 2020-05-12 RX ORDER — NALOXONE HYDROCHLORIDE 4 MG/.1ML
SPRAY NASAL
COMMUNITY
Start: 2020-03-23 | End: 2020-07-07 | Stop reason: ALTCHOICE

## 2020-05-12 NOTE — PROGRESS NOTES
CC: Follow-up status post right partial patellectomy and hardware removal, 3/13/2020    Interval history: Patient returns to clinic today stating he is doing fairly well. He states he has not been to therapy but has been performing at-home exercises. He has not been wearing the brace today but states he had been wearing his brace daily prior to this.  He has not been limiting his motion as we talked about previously per protocol and it appears that his compliance with physical therapy and bracing at home has been minimal.  Patient states that he is continued to repetitively pick the eschar off of his wound and per his report clean with peroxide intermittently.  He has some mild pain in his muscle. He has been taking tylenol.    Exam:  Right Knee-              Wound does appear to be healing though there is a small region of residual eschar in the central portion of the wound with no surrounding erythema or fluctuance and no subcutaneous fluid collection noted  ROM 0-110 degrees  4/5 on flexion  4/5 on extension  2 degree extensor lag  Positive sensation light tough all distributions symmetric to contralateral side  Brisk cap refill all digits  2+ dorsalis pedis pulse    Impression: Status post right partial patellectomy     Plan:  1. Advised patient to continue wearing the brace with range of motion from 0-90 degree lateral upright daily.   2. Advised patient to continue at-home exercise program for improvement in strength, range of motion and function with daily activities.  Advised him that he would do better with physical therapy but he states he is unable to do that at this time given his wife's work schedule.  3. I did discuss with patient that he is out of the window for need for prescription pain medication and so no further refills will be provided at this time.  He can continue with the Neurontin as well as over-the-counter anti-inflammatory medications versus prescription NSAID if his pain  worsens.  4. Follow-up in 6 weeks with repeat xray of the right knee.    By signing my name here, I Mony Hernandez, attest that all documentation on 05/12/20 at 09:11 has been prepared under the direction and in the presence of Dr. Gerald Abraham.    I, Dr. Gerald Abraham, personally performed the services described in this documentation, as scribed by Mony Hernandez, in my presence, and it is both accurate and complete.

## 2020-05-13 DIAGNOSIS — Z87.81 STATUS POST OPEN REDUCTION WITH INTERNAL FIXATION OF FRACTURE: ICD-10-CM

## 2020-05-13 DIAGNOSIS — Z98.890 STATUS POST OPEN REDUCTION WITH INTERNAL FIXATION OF FRACTURE: ICD-10-CM

## 2020-05-13 RX ORDER — OXYCODONE HYDROCHLORIDE AND ACETAMINOPHEN 5; 325 MG/1; MG/1
1 TABLET ORAL EVERY 4 HOURS PRN
Qty: 18 TABLET | Refills: 0 | OUTPATIENT
Start: 2020-05-13

## 2020-05-13 NOTE — TELEPHONE ENCOUNTER
Discussed with patient yesterday-should not need any further narcotic pain medication at this time.  He can use Tylenol, if he still having issues we can supplement with a prescription anti-inflammatory medication

## 2020-07-01 ENCOUNTER — LAB (OUTPATIENT)
Dept: LAB | Facility: HOSPITAL | Age: 30
End: 2020-07-01

## 2020-07-01 DIAGNOSIS — R53.83 FATIGUE, UNSPECIFIED TYPE: ICD-10-CM

## 2020-07-01 DIAGNOSIS — Z51.81 THERAPEUTIC DRUG MONITORING: ICD-10-CM

## 2020-07-01 DIAGNOSIS — C92.11 CHRONIC MYELOID LEUKEMIA, BCR/ABL-POSITIVE, IN REMISSION (HCC): Primary | ICD-10-CM

## 2020-07-01 LAB
ALBUMIN SERPL-MCNC: 4.1 G/DL (ref 3.5–5.2)
ALBUMIN/GLOB SERPL: 1.8 G/DL
ALP SERPL-CCNC: 84 U/L (ref 39–117)
ALT SERPL W P-5'-P-CCNC: 19 U/L (ref 1–41)
ANION GAP SERPL CALCULATED.3IONS-SCNC: 13.4 MMOL/L (ref 5–15)
AST SERPL-CCNC: 16 U/L (ref 1–40)
BASOPHILS # BLD AUTO: 0.05 10*3/MM3 (ref 0–0.2)
BASOPHILS NFR BLD AUTO: 0.6 % (ref 0–1.5)
BILIRUB SERPL-MCNC: 0.4 MG/DL (ref 0.2–1.2)
BUN SERPL-MCNC: 11 MG/DL (ref 6–20)
BUN/CREAT SERPL: 15.1 (ref 7–25)
CALCIUM SPEC-SCNC: 8.6 MG/DL (ref 8.6–10.5)
CHLORIDE SERPL-SCNC: 107 MMOL/L (ref 98–107)
CO2 SERPL-SCNC: 21.6 MMOL/L (ref 22–29)
CREAT SERPL-MCNC: 0.73 MG/DL (ref 0.76–1.27)
DEPRECATED RDW RBC AUTO: 46.8 FL (ref 37–54)
EOSINOPHIL # BLD AUTO: 0.38 10*3/MM3 (ref 0–0.4)
EOSINOPHIL NFR BLD AUTO: 4.5 % (ref 0.3–6.2)
ERYTHROCYTE [DISTWIDTH] IN BLOOD BY AUTOMATED COUNT: 13.2 % (ref 12.3–15.4)
GFR SERPL CREATININE-BSD FRML MDRD: 125 ML/MIN/1.73
GLOBULIN UR ELPH-MCNC: 2.3 GM/DL
GLUCOSE SERPL-MCNC: 155 MG/DL (ref 65–99)
HCT VFR BLD AUTO: 45.3 % (ref 37.5–51)
HGB BLD-MCNC: 14.5 G/DL (ref 13–17.7)
IMM GRANULOCYTES # BLD AUTO: 0.03 10*3/MM3 (ref 0–0.05)
IMM GRANULOCYTES NFR BLD AUTO: 0.4 % (ref 0–0.5)
LYMPHOCYTES # BLD AUTO: 1.61 10*3/MM3 (ref 0.7–3.1)
LYMPHOCYTES NFR BLD AUTO: 19.1 % (ref 19.6–45.3)
MCH RBC QN AUTO: 30.5 PG (ref 26.6–33)
MCHC RBC AUTO-ENTMCNC: 32 G/DL (ref 31.5–35.7)
MCV RBC AUTO: 95.2 FL (ref 79–97)
MONOCYTES # BLD AUTO: 0.37 10*3/MM3 (ref 0.1–0.9)
MONOCYTES NFR BLD AUTO: 4.4 % (ref 5–12)
NEUTROPHILS NFR BLD AUTO: 6 10*3/MM3 (ref 1.7–7)
NEUTROPHILS NFR BLD AUTO: 71 % (ref 42.7–76)
PLATELET # BLD AUTO: 185 10*3/MM3 (ref 140–450)
PMV BLD AUTO: 11.6 FL (ref 6–12)
POTASSIUM SERPL-SCNC: 3.8 MMOL/L (ref 3.5–5.2)
PROT SERPL-MCNC: 6.4 G/DL (ref 6–8.5)
RBC # BLD AUTO: 4.76 10*6/MM3 (ref 4.14–5.8)
SODIUM SERPL-SCNC: 142 MMOL/L (ref 136–145)
TSH SERPL DL<=0.05 MIU/L-ACNC: 1.22 UIU/ML (ref 0.27–4.2)
WBC # BLD AUTO: 8.44 10*3/MM3 (ref 3.4–10.8)

## 2020-07-01 PROCEDURE — 36415 COLL VENOUS BLD VENIPUNCTURE: CPT

## 2020-07-01 PROCEDURE — 80053 COMPREHEN METABOLIC PANEL: CPT

## 2020-07-01 PROCEDURE — 85025 COMPLETE CBC W/AUTO DIFF WBC: CPT

## 2020-07-01 PROCEDURE — 84443 ASSAY THYROID STIM HORMONE: CPT

## 2020-07-04 ENCOUNTER — APPOINTMENT (OUTPATIENT)
Dept: GENERAL RADIOLOGY | Facility: HOSPITAL | Age: 30
End: 2020-07-04

## 2020-07-04 ENCOUNTER — HOSPITAL ENCOUNTER (EMERGENCY)
Facility: HOSPITAL | Age: 30
Discharge: HOME | End: 2020-07-04
Attending: EMERGENCY MEDICINE | Admitting: EMERGENCY MEDICINE

## 2020-07-04 VITALS
SYSTOLIC BLOOD PRESSURE: 146 MMHG | OXYGEN SATURATION: 97 % | BODY MASS INDEX: 42.66 KG/M2 | HEIGHT: 72 IN | WEIGHT: 315 LBS | TEMPERATURE: 97.5 F | HEART RATE: 84 BPM | RESPIRATION RATE: 20 BRPM | DIASTOLIC BLOOD PRESSURE: 95 MMHG

## 2020-07-04 DIAGNOSIS — S82.001A CLOSED DISPLACED FRACTURE OF RIGHT PATELLA, UNSPECIFIED FRACTURE MORPHOLOGY, INITIAL ENCOUNTER: Primary | ICD-10-CM

## 2020-07-04 PROCEDURE — 99282 EMERGENCY DEPT VISIT SF MDM: CPT

## 2020-07-04 PROCEDURE — 99284 EMERGENCY DEPT VISIT MOD MDM: CPT | Performed by: EMERGENCY MEDICINE

## 2020-07-04 PROCEDURE — 73560 X-RAY EXAM OF KNEE 1 OR 2: CPT

## 2020-07-04 RX ORDER — HYDROCODONE BITARTRATE AND ACETAMINOPHEN 5; 325 MG/1; MG/1
1 TABLET ORAL EVERY 8 HOURS PRN
Qty: 10 TABLET | Refills: 0 | Status: SHIPPED | OUTPATIENT
Start: 2020-07-04 | End: 2020-07-07 | Stop reason: ALTCHOICE
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
## 2020-07-04 NOTE — ED NOTES
Patient with history of multiple knee surgeries to right knee, Dr Abraham preformed the surgeries on his knee, last surgery was March 13     Rachael Hutchison RN  07/04/20 3862

## 2020-07-04 NOTE — DISCHARGE INSTRUCTIONS
Rest your knee and the knee immobilizer as we discussed.  Use crutches for ambulatory assistance.  Must follow-up with orthopedics doctor.  Please return to the emergency room for any worsening pain, swelling, weakness, numbness or any other concerns.

## 2020-07-07 ENCOUNTER — OFFICE VISIT (OUTPATIENT)
Dept: ORTHOPEDIC SURGERY | Facility: CLINIC | Age: 30
End: 2020-07-07

## 2020-07-07 VITALS — HEIGHT: 72 IN | BODY MASS INDEX: 42.66 KG/M2 | WEIGHT: 315 LBS

## 2020-07-07 DIAGNOSIS — Z09 S/P ORTHOPEDIC SURGERY, FOLLOW-UP EXAM: ICD-10-CM

## 2020-07-07 DIAGNOSIS — Z87.81 STATUS POST OPEN REDUCTION WITH INTERNAL FIXATION OF FRACTURE: ICD-10-CM

## 2020-07-07 DIAGNOSIS — R52 PAIN: Primary | ICD-10-CM

## 2020-07-07 DIAGNOSIS — Z98.890 STATUS POST OPEN REDUCTION WITH INTERNAL FIXATION OF FRACTURE: ICD-10-CM

## 2020-07-07 PROCEDURE — 99214 OFFICE O/P EST MOD 30 MIN: CPT | Performed by: ORTHOPAEDIC SURGERY
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
## 2020-07-09 ENCOUNTER — TELEPHONE (OUTPATIENT)
Dept: ORTHOPEDIC SURGERY | Facility: CLINIC | Age: 30
End: 2020-07-09

## 2020-07-09 NOTE — TELEPHONE ENCOUNTER
Wife is calling asking if he can have pain medicine.  Says he overdid it yesterday and ibuprofen is not helping.

## 2020-07-13 LAB — REF LAB TEST METHOD: NORMAL

## 2020-07-15 ENCOUNTER — LAB (OUTPATIENT)
Dept: LAB | Facility: HOSPITAL | Age: 30
End: 2020-07-15

## 2020-07-15 ENCOUNTER — OFFICE VISIT (OUTPATIENT)
Dept: ONCOLOGY | Facility: CLINIC | Age: 30
End: 2020-07-15

## 2020-07-15 VITALS
DIASTOLIC BLOOD PRESSURE: 74 MMHG | BODY MASS INDEX: 42.66 KG/M2 | WEIGHT: 315 LBS | RESPIRATION RATE: 12 BRPM | TEMPERATURE: 98.2 F | HEIGHT: 72 IN | HEART RATE: 100 BPM | OXYGEN SATURATION: 100 % | SYSTOLIC BLOOD PRESSURE: 164 MMHG

## 2020-07-15 DIAGNOSIS — C92.11 CHRONIC MYELOID LEUKEMIA, BCR/ABL-POSITIVE, IN REMISSION (HCC): Primary | ICD-10-CM

## 2020-07-15 DIAGNOSIS — Z51.81 THERAPEUTIC DRUG MONITORING: ICD-10-CM

## 2020-07-15 PROCEDURE — 99214 OFFICE O/P EST MOD 30 MIN: CPT | Performed by: INTERNAL MEDICINE
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
## 2020-07-16 ENCOUNTER — HOSPITAL ENCOUNTER (OUTPATIENT)
Dept: MRI IMAGING | Facility: HOSPITAL | Age: 30
Discharge: HOME | End: 2020-07-16
Admitting: ORTHOPAEDIC SURGERY

## 2020-07-16 DIAGNOSIS — Z87.81 STATUS POST OPEN REDUCTION WITH INTERNAL FIXATION OF FRACTURE: ICD-10-CM

## 2020-07-16 DIAGNOSIS — Z09 S/P ORTHOPEDIC SURGERY, FOLLOW-UP EXAM: ICD-10-CM

## 2020-07-16 DIAGNOSIS — Z98.890 STATUS POST OPEN REDUCTION WITH INTERNAL FIXATION OF FRACTURE: ICD-10-CM

## 2020-07-16 PROCEDURE — 73721 MRI JNT OF LWR EXTRE W/O DYE: CPT

## 2020-07-21 ENCOUNTER — OFFICE VISIT (OUTPATIENT)
Dept: ORTHOPEDIC SURGERY | Facility: CLINIC | Age: 30
End: 2020-07-21

## 2020-07-21 DIAGNOSIS — Z98.890 STATUS POST HARDWARE REMOVAL: ICD-10-CM

## 2020-07-21 DIAGNOSIS — Z09 S/P ORTHOPEDIC SURGERY, FOLLOW-UP EXAM: Primary | ICD-10-CM

## 2020-07-21 DIAGNOSIS — Z98.890 STATUS POST OPEN REDUCTION WITH INTERNAL FIXATION OF FRACTURE: ICD-10-CM

## 2020-07-21 DIAGNOSIS — Z87.81 STATUS POST OPEN REDUCTION WITH INTERNAL FIXATION OF FRACTURE: ICD-10-CM

## 2020-07-21 PROCEDURE — 99213 OFFICE O/P EST LOW 20 MIN: CPT | Performed by: ORTHOPAEDIC SURGERY
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
## 2020-08-05 ENCOUNTER — APPOINTMENT (OUTPATIENT)
Dept: LAB | Facility: HOSPITAL | Age: 30
End: 2020-08-05

## 2020-08-13 ENCOUNTER — TELEPHONE (OUTPATIENT)
Dept: ONCOLOGY | Facility: CLINIC | Age: 30
End: 2020-08-13

## 2020-08-25 ENCOUNTER — APPOINTMENT (OUTPATIENT)
Dept: LAB | Facility: HOSPITAL | Age: 30
End: 2020-08-25

## 2020-09-18 ENCOUNTER — OFFICE VISIT (OUTPATIENT)
Dept: ORTHOPEDIC SURGERY | Facility: CLINIC | Age: 30
End: 2020-09-18

## 2020-09-18 VITALS — WEIGHT: 315 LBS | HEIGHT: 72 IN | BODY MASS INDEX: 42.66 KG/M2

## 2020-09-18 DIAGNOSIS — Z09 S/P ORTHOPEDIC SURGERY, FOLLOW-UP EXAM: Primary | ICD-10-CM

## 2020-09-18 PROCEDURE — 99212 OFFICE O/P EST SF 10 MIN: CPT | Performed by: ORTHOPAEDIC SURGERY

## 2020-09-18 PROCEDURE — 73562 X-RAY EXAM OF KNEE 3: CPT | Performed by: ORTHOPAEDIC SURGERY

## 2020-09-18 RX ORDER — CYCLOBENZAPRINE HCL 5 MG
TABLET ORAL
COMMUNITY
Start: 2020-08-17

## 2020-09-18 RX ORDER — HYDROCODONE BITARTRATE AND ACETAMINOPHEN 7.5; 325 MG/1; MG/1
TABLET ORAL
COMMUNITY
Start: 2020-09-09 | End: 2022-03-15
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
## 2020-11-05 ENCOUNTER — TELEPHONE (OUTPATIENT)
Dept: ORTHOPEDIC SURGERY | Facility: CLINIC | Age: 30
End: 2020-11-05

## 2020-11-05 NOTE — TELEPHONE ENCOUNTER
Caller: Chaitanya Canales    Relationship: Self    Best call back number: [phone]     What form or medical record are you requestin WORK NOTES / 2 EXCUSES FROM WORK FOR DAYS MISSED FROM 20 - 20 & 20 - 20     FOLLOWING RIGHT PATELLA ORIF AND I&D DONE 20 AS WELL AS RIGHT PATELLA HARDWARE REMOVAL DONE 20 BOTH BY DR. DEVI/    Who is requesting this form or medical record from you: PATIENT REQUESTING NOTES TO GIVE TO HIS PREVIOUS EMPLOYER - [employer] - FOR THEIR INSURANCE COMPANY - [employer] - FOR PATIENT TO OBTAIN LOST WAGES FOR DAYS MISSED.     How would you like to receive the form or medical records PATIENT REQUESTED 2 WORK NOTES BE UPLOADED TO [employer] AS WELL AS PAPER COPIES TO BE MAILED TO PATIENT'S HOME ADDRESS: [address] [address]    PATIENT ALSO REQUESTED CALL BACK [phone] WHEN 2 WORK NOTES ARE UPLOADED TO [employer] & MAILED OUT TO HIM    Timeframe paperwork needed: ASAP     Additional notes: PATIENT'S  RASHEL SAMANO (OUT OF [facility] IN [place], [place] AT PH: [phone]) ADVISED PATIENT TO REQUEST THESE 2 WORK NOTES TO SEND TO PATIENT'S EMPLOYER.     THANKS

## 2021-01-20 NOTE — TELEPHONE ENCOUNTER
Sprycel refill request rec electronically from [facility]. Pt has no follow up appts in the office. He was last seen by Dr Mcbride on 7/15/2020. Pts last fill was on 12/4/2020 per [facility]    Message sent to Dr Mcbride-He will authorize a ONE MONTH supply only.     Message also sent to scheduling to contact pt for in office appt as he is on Sprycel that needs monitoring.    I have routed the rx to Dr Mcbride for signature-One month supply with NO refills. Once signed it will be escribed to [facility]

## 2021-01-21 ENCOUNTER — MEDICATION THERAPY MANAGEMENT (OUTPATIENT)
Dept: ONCOLOGY | Facility: HOSPITAL | Age: 31
End: 2021-01-21

## 2021-01-21 NOTE — PROGRESS NOTES
"MTM telephone encounter re adherence and side effects ( Sprycel)    Mr Canales was introduced to the MT program today.  He has a follow up appointment with Dr Mcbride next month and he states he is aware of this appointment.  He reports that he is taking Sprycel 100 mg po daily and has no side effects- denying SOA, fluid retention, or GI side effects.  He states that he is eating well, but \"hibernating because it's winter.\"  He had no questions or concerns for the MTM office today.  We reviewed his medication list together for accuracy.  DDI reviewed:  Hydrocodone+Sprycel- level D interaction with fair reliability rating states to monitor for signs of liver toxicity ( 7/20 LFTS are within normal limits).    "

## 2021-02-10 ENCOUNTER — APPOINTMENT (OUTPATIENT)
Dept: LAB | Facility: HOSPITAL | Age: 31
End: 2021-02-10

## 2021-02-17 ENCOUNTER — LAB (OUTPATIENT)
Dept: LAB | Facility: HOSPITAL | Age: 31
End: 2021-02-17

## 2021-02-17 DIAGNOSIS — C92.11 CHRONIC MYELOID LEUKEMIA, BCR/ABL-POSITIVE, IN REMISSION (HCC): ICD-10-CM

## 2021-02-17 DIAGNOSIS — Z51.81 THERAPEUTIC DRUG MONITORING: ICD-10-CM

## 2021-02-17 LAB
ALBUMIN SERPL-MCNC: 4 G/DL (ref 3.5–5.2)
ALBUMIN/GLOB SERPL: 1.5 G/DL
ALP SERPL-CCNC: 98 U/L (ref 39–117)
ALT SERPL W P-5'-P-CCNC: 25 U/L (ref 1–41)
ANION GAP SERPL CALCULATED.3IONS-SCNC: 9.5 MMOL/L (ref 5–15)
AST SERPL-CCNC: 20 U/L (ref 1–40)
BASOPHILS # BLD AUTO: 0.05 10*3/MM3 (ref 0–0.2)
BASOPHILS NFR BLD AUTO: 0.6 % (ref 0–1.5)
BILIRUB SERPL-MCNC: 0.2 MG/DL (ref 0–1.2)
BUN SERPL-MCNC: 14 MG/DL (ref 6–20)
BUN/CREAT SERPL: 16.9 (ref 7–25)
CALCIUM SPEC-SCNC: 8.9 MG/DL (ref 8.6–10.5)
CHLORIDE SERPL-SCNC: 107 MMOL/L (ref 98–107)
CO2 SERPL-SCNC: 24.5 MMOL/L (ref 22–29)
CREAT SERPL-MCNC: 0.83 MG/DL (ref 0.76–1.27)
DEPRECATED RDW RBC AUTO: 45.8 FL (ref 37–54)
EOSINOPHIL # BLD AUTO: 0.37 10*3/MM3 (ref 0–0.4)
EOSINOPHIL NFR BLD AUTO: 4.6 % (ref 0.3–6.2)
ERYTHROCYTE [DISTWIDTH] IN BLOOD BY AUTOMATED COUNT: 12.7 % (ref 12.3–15.4)
GFR SERPL CREATININE-BSD FRML MDRD: 107 ML/MIN/1.73
GLOBULIN UR ELPH-MCNC: 2.7 GM/DL
GLUCOSE SERPL-MCNC: 96 MG/DL (ref 65–99)
HCT VFR BLD AUTO: 47.4 % (ref 37.5–51)
HGB BLD-MCNC: 15.2 G/DL (ref 13–17.7)
IMM GRANULOCYTES # BLD AUTO: 0.02 10*3/MM3 (ref 0–0.05)
IMM GRANULOCYTES NFR BLD AUTO: 0.3 % (ref 0–0.5)
LYMPHOCYTES # BLD AUTO: 1.58 10*3/MM3 (ref 0.7–3.1)
LYMPHOCYTES NFR BLD AUTO: 19.8 % (ref 19.6–45.3)
MCH RBC QN AUTO: 31.3 PG (ref 26.6–33)
MCHC RBC AUTO-ENTMCNC: 32.1 G/DL (ref 31.5–35.7)
MCV RBC AUTO: 97.5 FL (ref 79–97)
MONOCYTES # BLD AUTO: 0.74 10*3/MM3 (ref 0.1–0.9)
MONOCYTES NFR BLD AUTO: 9.3 % (ref 5–12)
NEUTROPHILS NFR BLD AUTO: 5.22 10*3/MM3 (ref 1.7–7)
NEUTROPHILS NFR BLD AUTO: 65.4 % (ref 42.7–76)
PLATELET # BLD AUTO: 185 10*3/MM3 (ref 140–450)
PMV BLD AUTO: 11.9 FL (ref 6–12)
POTASSIUM SERPL-SCNC: 4 MMOL/L (ref 3.5–5.2)
PROT SERPL-MCNC: 6.7 G/DL (ref 6–8.5)
RBC # BLD AUTO: 4.86 10*6/MM3 (ref 4.14–5.8)
SODIUM SERPL-SCNC: 141 MMOL/L (ref 136–145)
WBC # BLD AUTO: 7.98 10*3/MM3 (ref 3.4–10.8)

## 2021-02-17 PROCEDURE — 85025 COMPLETE CBC W/AUTO DIFF WBC: CPT

## 2021-02-17 PROCEDURE — 80053 COMPREHEN METABOLIC PANEL: CPT

## 2021-02-17 PROCEDURE — 36415 COLL VENOUS BLD VENIPUNCTURE: CPT

## 2021-02-24 ENCOUNTER — TELEPHONE (OUTPATIENT)
Dept: ONCOLOGY | Facility: CLINIC | Age: 31
End: 2021-02-24

## 2021-02-24 RX ORDER — DASATINIB 100 MG/1
TABLET ORAL
Qty: 30 TABLET | Refills: 0 | Status: SHIPPED | OUTPATIENT
Start: 2021-02-24 | End: 2021-03-22

## 2021-02-24 NOTE — TELEPHONE ENCOUNTER
----- Message from Renan Mcbride MD sent at 2/24/2021  1:52 PM EST -----  The patient's BCR-ABL PCR sample was disrupted because of the weather.  He needs to be recollected and his appointment moved out 2 weeks after the blood draw.

## 2021-02-24 NOTE — TELEPHONE ENCOUNTER
Pt was to see Dr Mcbride on 3/3/2021 but this appt needed to be changed. Dr Mcbride has authorized a 30 day fill until he sees pt.    I have routed the rx to Dr Mcbride for signature. Once signed it will be escribed to [facility])    Confirmation rec that Dr Mcbride has signed the Sprycel rx. This was sent to [facility]

## 2021-02-24 NOTE — TELEPHONE ENCOUNTER
DELETE AFTER REVIEWING: Telephone encounter to be sent to the clinical pool.  If patient has less than a 3 day supply left, send the encounter HIGH Priority.    Caller:  JOAN    Relationship: PHARMACY-[facility]    Best call back number: [phone]    Medication needed:   Requested Prescriptions      No prescriptions requested or ordered in this encounter     SPRYCEL 100 MG           Does the patient have less than a 3 day supply:  [] Yes  [x] No    What is the patient's preferred pharmacy:    [facility]

## 2021-02-25 ENCOUNTER — LAB (OUTPATIENT)
Dept: LAB | Facility: HOSPITAL | Age: 31
End: 2021-02-25

## 2021-02-25 DIAGNOSIS — C92.11 CHRONIC MYELOID LEUKEMIA, BCR/ABL-POSITIVE, IN REMISSION (HCC): Primary | ICD-10-CM

## 2021-02-25 PROCEDURE — 36415 COLL VENOUS BLD VENIPUNCTURE: CPT

## 2021-03-03 ENCOUNTER — APPOINTMENT (OUTPATIENT)
Dept: LAB | Facility: HOSPITAL | Age: 31
End: 2021-03-03

## 2021-03-09 ENCOUNTER — APPOINTMENT (OUTPATIENT)
Dept: LAB | Facility: HOSPITAL | Age: 31
End: 2021-03-09

## 2021-03-09 ENCOUNTER — DOCUMENTATION (OUTPATIENT)
Dept: ONCOLOGY | Facility: CLINIC | Age: 31
End: 2021-03-09

## 2021-03-09 ENCOUNTER — OFFICE VISIT (OUTPATIENT)
Dept: ONCOLOGY | Facility: CLINIC | Age: 31
End: 2021-03-09

## 2021-03-09 ENCOUNTER — LAB (OUTPATIENT)
Dept: LAB | Facility: HOSPITAL | Age: 31
End: 2021-03-09

## 2021-03-09 VITALS
OXYGEN SATURATION: 99 % | RESPIRATION RATE: 16 BRPM | TEMPERATURE: 98 F | HEART RATE: 86 BPM | DIASTOLIC BLOOD PRESSURE: 84 MMHG | SYSTOLIC BLOOD PRESSURE: 157 MMHG | BODY MASS INDEX: 42.66 KG/M2 | WEIGHT: 315 LBS | HEIGHT: 72 IN

## 2021-03-09 DIAGNOSIS — C92.11 CHRONIC MYELOID LEUKEMIA, BCR/ABL-POSITIVE, IN REMISSION (HCC): Primary | ICD-10-CM

## 2021-03-09 PROCEDURE — 99213 OFFICE O/P EST LOW 20 MIN: CPT | Performed by: INTERNAL MEDICINE

## 2021-03-09 NOTE — PROGRESS NOTES
I left pt a VM about his delivery to his local [facility].    He called me back stating he will check with them again as they usually call him when it is delivered. I asked him to call if there are any further issues.
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
## 2021-03-09 NOTE — PROGRESS NOTES
Staff message rec from Dr Mcbride-pt reported being out of his Sprycel for 2 weeks.    A new rx was sent to [facility] on 2/24/2021 for one month until pt was seen in the office. I contacted [facility] [phone] and spoke to Dania. She states pt requested the medication be shipped to his local [facility] and this was delivered to them on 3/4/2021.    I have notified Dr Mcbride and [facility] Pharmacists.    Renan Mcbride MD sent to Ebonie Garcai; [facility] Oral Onc Rohan Tavares has been out of his Sprycel for 2 weeks maybe because we were waiting for him to be seen?? Okay to refill and get to him asap.   nash

## 2021-03-10 ENCOUNTER — MEDICATION THERAPY MANAGEMENT (OUTPATIENT)
Dept: PHARMACY | Facility: HOSPITAL | Age: 31
End: 2021-03-10

## 2021-03-10 NOTE — PROGRESS NOTES
MTM telephone encounter re adherence ( Sprycel)    Mr Canales states is going to Reissued after work today to  Sprycel Rx.

## 2021-03-12 LAB — REF LAB TEST METHOD: NORMAL

## 2021-03-22 RX ORDER — DASATINIB 100 MG/1
TABLET ORAL
Qty: 30 TABLET | Refills: 4 | Status: SHIPPED | OUTPATIENT
Start: 2021-03-22 | End: 2021-09-14

## 2021-03-22 NOTE — TELEPHONE ENCOUNTER
Sprycel refill request rec elelctronically from [facility]. Pt was seen by Dr Mcbride on 3/9/2021 and he is to continue 100 mg daily. Pt has his next appt in August to see Dr Mcbride. Pt's last delivery was delivered to his local [facility] on 3/4/2021.    I have routed the rx with 4 additional refills to Dr Mcbride to sign. Once signed it will be escribed to [facility] on [address]

## 2021-03-25 ENCOUNTER — MEDICATION THERAPY MANAGEMENT (OUTPATIENT)
Dept: PHARMACY | Facility: HOSPITAL | Age: 31
End: 2021-03-25

## 2021-06-25 ENCOUNTER — MEDICATION THERAPY MANAGEMENT (OUTPATIENT)
Dept: PHARMACY | Facility: HOSPITAL | Age: 31
End: 2021-06-25

## 2021-06-25 NOTE — PROGRESS NOTES
MTM encounter -- Sprycel    Mr. Canales states he is feeling well today. He states that he is not experiencing any side effects with any of his medications. He takes his Sprycel at night and maybe misses a dose once a week depending on how his  schedule is. He has PRN Norco that he takes TID and is controlling his pain. He has not had any medication changes recently and did not have any questions for me today.    Thanks,  Harriet Marquez, Pharmacy Intern

## 2021-07-04 NOTE — ANESTHESIA PREPROCEDURE EVALUATION
Anesthesia Evaluation     Patient summary reviewed and Nursing notes reviewed   no history of anesthetic complications:  NPO Solid Status: > 8 hours  NPO Liquid Status: > 6 hours           Airway   Mallampati: II  TM distance: >3 FB  Neck ROM: full  Possible difficult intubation and Large neck circumference  Dental - normal exam     Pulmonary - normal exam    breath sounds clear to auscultation  (+) a smoker (1 + ppd x 10+ yrs) Current Smoked day of surgery,   Cardiovascular - normal exam    ECG reviewed  Rhythm: regular  Rate: normal    (+) PVD (ankle swelling), hyperlipidemia,       Neuro/Psych  (+) numbness (bilat legs),     GI/Hepatic/Renal/Endo    (+) morbid obesity,      Musculoskeletal     (+) back pain, chronic pain,       ROS comment: Pain pump insitu  Abdominal   (+) obese,    Substance History - negative use     OB/GYN          Other   arthritis, blood dyscrasia anemia,   history of cancer (Chronic myeloid leukemia) active                    Anesthesia Plan    ASA 3 - emergent     general with block   (Fem)  intravenous induction     Anesthetic plan, all risks, benefits, and alternatives have been provided, discussed and informed consent has been obtained with: patient.  Use of blood products discussed with patient  Consented to blood products.      - - -

## 2021-08-25 ENCOUNTER — APPOINTMENT (OUTPATIENT)
Dept: LAB | Facility: HOSPITAL | Age: 31
End: 2021-08-25

## 2021-08-30 ENCOUNTER — TELEPHONE (OUTPATIENT)
Dept: ONCOLOGY | Facility: CLINIC | Age: 31
End: 2021-08-30

## 2021-09-08 ENCOUNTER — MEDICATION THERAPY MANAGEMENT (OUTPATIENT)
Dept: PHARMACY | Facility: HOSPITAL | Age: 31
End: 2021-09-08

## 2021-09-08 NOTE — PROGRESS NOTES
[facility] telephone encounter re Sprycel refill    Called Mr Canales to attempt to get him to call scheduling to set up a follow up appointment- ( see below)- no answer and no roll over to voice mail      Renan Mcbride MD Kolb, Kimberley, RPH  Wait until seen           Previous Messages       ----- Message -----   From: Ginette Hannah RPH   Sent: 9/8/2021   2:13 PM EDT   To: Renan cMbride MD     Hi Dr Mcbride,     I have a refill request on his Sprycel.  He has been a no show recently and also cancelling appointments.  Scheduling has been unable to get him to answer.  Would you like to me to go on and refill for now, or wait until he is seen?     Thanks,   Rocío

## 2021-09-09 RX ORDER — DASATINIB 100 MG/1
TABLET ORAL
Refills: 4 | OUTPATIENT
Start: 2021-09-09

## 2021-09-10 RX ORDER — DASATINIB 100 MG/1
TABLET ORAL
Refills: 4 | OUTPATIENT
Start: 2021-09-10

## 2021-09-13 RX ORDER — DASATINIB 100 MG/1
TABLET ORAL
Refills: 4 | OUTPATIENT
Start: 2021-09-13

## 2021-09-14 RX ORDER — DASATINIB 100 MG/1
TABLET ORAL
Qty: 30 TABLET | Refills: 4 | Status: SHIPPED | OUTPATIENT
Start: 2021-09-14 | End: 2021-12-16 | Stop reason: SDUPTHER

## 2021-09-14 NOTE — TELEPHONE ENCOUNTER
Refill requested from pharmacy. Per last chart note, patient to continue sprycel 100 mg po daily. Will route to MD for cosignature.

## 2021-09-14 NOTE — PHARMACY PATIENT ASSISTANCE
After sending patient's sprycel refill, I realized this should have been denied due to needing to schedule an appointment. Called the pharmacy ([facility], spoke with Shameka Stallings [facility]) to tell them to cancel that refill and quit sending us refill requests. Patient must be seen prior to approved.   Saskia Holbrook, MoyD  9/14/2021  09:28 EDT

## 2021-09-29 RX ORDER — DASATINIB 100 MG/1
TABLET ORAL
Refills: 4 | OUTPATIENT
Start: 2021-09-29

## 2021-10-01 ENCOUNTER — TELEPHONE (OUTPATIENT)
Dept: ONCOLOGY | Facility: CLINIC | Age: 31
End: 2021-10-01

## 2021-11-05 ENCOUNTER — SPECIALTY PHARMACY (OUTPATIENT)
Dept: PHARMACY | Facility: HOSPITAL | Age: 31
End: 2021-11-05

## 2021-11-11 ENCOUNTER — HOSPITAL ENCOUNTER (EMERGENCY)
Facility: HOSPITAL | Age: 31
Discharge: HOME | End: 2021-11-11
Attending: EMERGENCY MEDICINE | Admitting: EMERGENCY MEDICINE

## 2021-11-11 ENCOUNTER — APPOINTMENT (OUTPATIENT)
Dept: GENERAL RADIOLOGY | Facility: HOSPITAL | Age: 31
End: 2021-11-11

## 2021-11-11 VITALS
HEART RATE: 80 BPM | BODY MASS INDEX: 36.45 KG/M2 | SYSTOLIC BLOOD PRESSURE: 141 MMHG | WEIGHT: 315 LBS | DIASTOLIC BLOOD PRESSURE: 81 MMHG | HEIGHT: 78 IN | OXYGEN SATURATION: 98 % | TEMPERATURE: 98.4 F | RESPIRATION RATE: 16 BRPM

## 2021-11-11 DIAGNOSIS — S81.011A KNEE LACERATION, RIGHT, INITIAL ENCOUNTER: Primary | ICD-10-CM

## 2021-11-11 PROCEDURE — 99283 EMERGENCY DEPT VISIT LOW MDM: CPT

## 2021-11-11 PROCEDURE — 90471 IMMUNIZATION ADMIN: CPT | Performed by: EMERGENCY MEDICINE

## 2021-11-11 PROCEDURE — 90715 TDAP VACCINE 7 YRS/> IM: CPT | Performed by: EMERGENCY MEDICINE

## 2021-11-11 PROCEDURE — 12002 RPR S/N/AX/GEN/TRNK2.6-7.5CM: CPT | Performed by: EMERGENCY MEDICINE

## 2021-11-11 PROCEDURE — 73562 X-RAY EXAM OF KNEE 3: CPT

## 2021-11-11 PROCEDURE — 25010000002 TETANUS-DIPHTH-ACELL PERTUSSIS 5-2.5-18.5 LF-MCG/0.5 SUSPENSION PREFILLED SYRINGE: Performed by: EMERGENCY MEDICINE

## 2021-11-11 RX ORDER — HYDROCODONE BITARTRATE AND ACETAMINOPHEN 5; 325 MG/1; MG/1
2 TABLET ORAL ONCE
Status: COMPLETED | OUTPATIENT
Start: 2021-11-11 | End: 2021-11-11

## 2021-11-11 RX ORDER — SULFAMETHOXAZOLE AND TRIMETHOPRIM 800; 160 MG/1; MG/1
1 TABLET ORAL 2 TIMES DAILY
Qty: 14 TABLET | Refills: 0 | Status: SHIPPED | OUTPATIENT
Start: 2021-11-11 | End: 2021-11-18

## 2021-11-11 RX ORDER — SULFAMETHOXAZOLE AND TRIMETHOPRIM 800; 160 MG/1; MG/1
1 TABLET ORAL ONCE
Status: COMPLETED | OUTPATIENT
Start: 2021-11-11 | End: 2021-11-11

## 2021-11-11 RX ORDER — BUPIVACAINE HYDROCHLORIDE 5 MG/ML
INJECTION, SOLUTION EPIDURAL; INTRACAUDAL
Status: COMPLETED
Start: 2021-11-11 | End: 2021-11-11

## 2021-11-11 RX ORDER — BUPIVACAINE HYDROCHLORIDE AND EPINEPHRINE 5; 5 MG/ML; UG/ML
10 INJECTION, SOLUTION EPIDURAL; INTRACAUDAL; PERINEURAL ONCE
Status: DISCONTINUED | OUTPATIENT
Start: 2021-11-11 | End: 2021-11-11

## 2021-11-11 RX ORDER — BUPIVACAINE HYDROCHLORIDE 5 MG/ML
10 INJECTION, SOLUTION EPIDURAL; INTRACAUDAL ONCE
Status: COMPLETED | OUTPATIENT
Start: 2021-11-11 | End: 2021-11-11

## 2021-11-11 RX ADMIN — BUPIVACAINE HYDROCHLORIDE 10 ML: 5 INJECTION, SOLUTION EPIDURAL; INTRACAUDAL at 20:16

## 2021-11-11 RX ADMIN — SULFAMETHOXAZOLE AND TRIMETHOPRIM 1 TABLET: 800; 160 TABLET ORAL at 20:16

## 2021-11-11 RX ADMIN — HYDROCODONE BITARTRATE AND ACETAMINOPHEN 2 TABLET: 5; 325 TABLET ORAL at 20:49

## 2021-11-11 RX ADMIN — TETANUS TOXOID, REDUCED DIPHTHERIA TOXOID AND ACELLULAR PERTUSSIS VACCINE, ADSORBED 0.5 ML: 5; 2.5; 8; 8; 2.5 SUSPENSION INTRAMUSCULAR at 19:18

## 2021-11-12 NOTE — ED PROVIDER NOTES
EMERGENCY DEPARTMENT ENCOUNTER      Room Number: 11/11      HPI:    Chief complaint: Fall with knee laceration    Location: Right superior patella    Quality/Severity: Moderate    Timing/Duration: Injury occurred just prior to arrival    Modifying Factors: Patient has previously fractured his right patella twice and also has twice undergone operative repair.  The last episode was March 2020 with Dr. Gerald Abraham.    Associated Symptoms: Pain and bleeding    Narrative: Pt is a 31 y.o. male who presents complaining of a right knee laceration as noted above.  Patient states that he fell forward with his right knee directly striking concrete.      PMD: Álvaro Washington MD    REVIEW OF SYSTEMS  Review of Systems   Constitutional: Negative for activity change, appetite change, fatigue and fever.        Obesity   Respiratory: Negative for cough and shortness of breath.    Cardiovascular: Negative for chest pain.   Genitourinary: Negative for difficulty urinating and dysuria.   Musculoskeletal: Negative for back pain.   Skin: Positive for wound (Right knee as previously noted).   Neurological: Negative for dizziness, seizures and syncope.   Psychiatric/Behavioral: Negative for confusion and decreased concentration.   All other systems reviewed and are negative.      PAST MEDICAL HISTORY  Active Ambulatory Problems     Diagnosis Date Noted   • Chronic myeloid leukemia, BCR/ABL-positive, in remission (HCC) 05/17/2016   • Therapeutic drug monitoring 05/01/2017   • Facial cellulitis 01/28/2019   • Displaced longitudinal fracture of right patella 01/18/2020   • Status post open reduction with internal fixation of patella fracture, 01/18/2020 01/24/2020   • Right patella fracture 03/11/2020   • Closed fracture of right patella 03/11/2020   • S/P orthopedic surgery, follow-up exam, S/P hardware removal right patella, partial patellectomy, DOS 03/13/2020 03/22/2020     Resolved Ambulatory Problems     Diagnosis Date Noted   • No  Resolved Ambulatory Problems     Past Medical History:   Diagnosis Date   • Cancer (HCC) 05/2014   • Injury of back    • Laceration of lower leg with infection 2016   • Leukemia (HCC)    • Leukocytosis    • Osteoarthritis    • Pain in back    • Tattoos        PAST SURGICAL HISTORY  Past Surgical History:   Procedure Laterality Date   • ADENOIDECTOMY     • HARDWARE REMOVAL Right 3/13/2020    Procedure: KNEE HARDWARE REMOVAL;  Surgeon: Gerald Abraham MD;  Location: [facility] OR;  Service: Orthopedics;  Laterality: Right;   • LEG SURGERY Right     8 surgeries due to chronic infection   • PAIN PUMP INSERTION/REVISION      no meds in it at this time   • PAIN PUMP INSERTION/REVISION      back   • PATELLA OPEN REDUCTION INTERNAL FIXATION Right 1/18/2020    Procedure: irrigation and debridement of open patellar fracture, OPEN REDUCTION INTERNAL FIXATION right patella, and closure of wound <2cm;  Surgeon: Gerald Abraham MD;  Location: [facility] OR;  Service: Orthopedics   • PATELLECTOMY Right 3/13/2020    Procedure: Partial patellectomy;  Surgeon: Gerald Abraham MD;  Location: [facility] OR;  Service: Orthopedics;  Laterality: Right;   • SKIN GRAFT Right     leg   • TONSILLECTOMY         FAMILY HISTORY  Family History   Adopted: Yes   Family history unknown: Yes       SOCIAL HISTORY  Social History     Socioeconomic History   • Marital status:      Spouse name: Merritt   • Number of children: 3   • Years of education: Some College   Tobacco Use   • Smoking status: Current Every Day Smoker     Packs/day: 0.25     Years: 6.00     Pack years: 1.50     Types: Cigarettes   • Smokeless tobacco: Never Used   • Tobacco comment: caffeine use   Substance and Sexual Activity   • Alcohol use: No   • Drug use: No     Comment: tattoos   • Sexual activity: Defer     Partners: Female       ALLERGIES  Bee venom, Codeine, Morphine, Sulfa antibiotics, Sulfamethoxazole-trimethoprim, and Ultram [tramadol]    PHYSICAL EXAM  ED Triage  Vitals [11/11/21 1848]   Temp Heart Rate Resp BP SpO2   98.4 °F (36.9 °C) 74 17 174/90 99 %      Temp src Heart Rate Source Patient Position BP Location FiO2 (%)   Oral Apical Sitting Right arm --       Physical Exam  Constitutional:       Appearance: He is obese.   HENT:      Head: Normocephalic and atraumatic.   Musculoskeletal:      Comments: Examination of the right knee does demonstrate a 4 cm horizontal laceration along the superior border of the patella.  Diffuse localized tenderness.  No obvious deformities present.  Neuromuscular vascular exams intact distally.  Patient unable to tolerate exam for ligamentous stability secondary to pain.   Skin:     General: Skin is warm and dry.   Neurological:      Mental Status: He is alert.   Psychiatric:         Mood and Affect: Mood normal.         Behavior: Behavior normal.         Thought Content: Thought content normal.         Judgment: Judgment normal.         LAB RESULTS  Results for orders placed or performed in visit on 02/25/21   BCR-ABL1, CML / ALL, PCR, Quant    Specimen: Blood   Result Value Ref Range    Reference Lab Report BCR/ABL PCR          I ordered the above labs and reviewed the results    RADIOLOGY  XR Knee 3 View Right    Result Date: 11/11/2021  Narrative: CR Knee 3 Vws RT INDICATION: Right knee pain with fall and laceration over patella today. COMPARISON: Plain film from 7/4/2020. FINDINGS: AP lateral and sunrise views view(s) of the right knee. Films initially mislabeled left but I have confirmed with technologist the right knee was imaged. There is evidence of a soft tissue laceration anterior to the upper pole of the patella. There is chronic deformity of the upper pole of the patella its not changed from July 2020. There are dystrophic calcifications in the distal quadriceps tendon region and there are postoperative changes to the patella. Please correlate with history. No acute fracture or dislocation radiopaque foreign body is suspected.      Impression: 1. Findings of soft tissue laceration anterior to the upper pole of the patella without evidence for radiopaque foreign body or acute fracture. 2. Chronic changes to the patella and distal quadriceps tendon consistent with prior surgery and please correlate further with history Signer Name: Cecilia Cortez MD  Signed: 11/11/2021 8:05 PM  Workstation Name: [email or web address]  [facility]      I ordered the above radiologic testing and reviewed the results    PROCEDURES  Procedures      PROGRESS AND CONSULTS  ED Course as of 11/11/21 2048   u Nov 11, 2021 2006 Local anesthesia achieved using 10 mL of 0.5% bupivacaine.  Wound is currently being prepped in the usual sterile fashion by the emergency department technician.  Radiology images personally reviewed and no acute fractures appreciated and awaiting final radiology report. [ML]   2010 Final radiology report noted. [ML]   2045 Wound edges approximated using 6 horizontal mattress sutures of 4-0 Ethilon with good results.  Wound care and warnings discussed with patient. [ML]      ED Course User Index  [ML] Jhonatan Larsen MD           MEDICAL DECISION MAKING  Results were reviewed/discussed with the patient and they were also made aware of online access. Pt also made aware that some labs, such as cultures, will not be resulted during ER visit and follow up with PMD is necessary.     MDM       DIAGNOSIS  Final diagnoses:   Knee laceration, right, initial encounter       Latest Documented Vital Signs:  As of 20:48 EST  BP- 150/87 HR- 74 Temp- 98.4 °F (36.9 °C) (Oral) O2 sat- 99%    DISPOSITION  Discharged in good condition       Medication List      New Prescriptions    sulfamethoxazole-trimethoprim 800-160 MG per tablet  Commonly known as: BACTRIM DS,SEPTRA DS  Take 1 tablet by mouth 2 (Two) Times a Day for 7 days.           Where to Get Your Medications      These medications were sent to [id number] - [place],  [place] - [address] AT [facility] OF [facility] & [address] - [phone] PH - [phone] FX  [address], [place] [address]    Phone: [phone]   · sulfamethoxazole-trimethoprim 800-160 MG per tablet          Follow-up Information     Álvaro Washington MD In 2 weeks.    Specialty: General Practice  Why: For suture removal  Contact information:  [address]  [facility] [address] [phone]                            Jhonatan Larsen MD  11/11/21 2048

## 2021-12-02 ENCOUNTER — SPECIALTY PHARMACY (OUTPATIENT)
Dept: PHARMACY | Facility: HOSPITAL | Age: 31
End: 2021-12-02

## 2021-12-02 ENCOUNTER — TELEPHONE (OUTPATIENT)
Dept: ORTHOPEDIC SURGERY | Facility: CLINIC | Age: 31
End: 2021-12-02

## 2021-12-02 NOTE — TELEPHONE ENCOUNTER
Caller: DANIEL BELL    Relationship: PATIENT    Best call back number: [phone]    What form or medical record are you requesting: PATIENT IS TRYING TO GET A JOB AT [employer]. NEEDS A LETTER FROM MD STATING HE CAN WORK IN RELATING TO PAST TREATMENT OF HIS RIGHT KNEE.    Who is requesting this form or medical record from you: [employer]    How would you like to receive the form or medical records (pick-up, mail, fax):  FAX  If fax, what is the fax number:  [phone] ([employer] [facility])

## 2021-12-08 ENCOUNTER — LAB (OUTPATIENT)
Dept: LAB | Facility: HOSPITAL | Age: 31
End: 2021-12-08

## 2021-12-08 DIAGNOSIS — C92.11 CHRONIC MYELOID LEUKEMIA, BCR/ABL-POSITIVE, IN REMISSION (HCC): ICD-10-CM

## 2021-12-08 LAB
ALBUMIN SERPL-MCNC: 4.4 G/DL (ref 3.5–5.2)
ALBUMIN/GLOB SERPL: 1.6 G/DL
ALP SERPL-CCNC: 87 U/L (ref 39–117)
ALT SERPL W P-5'-P-CCNC: 31 U/L (ref 1–41)
ANION GAP SERPL CALCULATED.3IONS-SCNC: 9.9 MMOL/L (ref 5–15)
AST SERPL-CCNC: 22 U/L (ref 1–40)
BASOPHILS # BLD AUTO: 0.06 10*3/MM3 (ref 0–0.2)
BASOPHILS NFR BLD AUTO: 0.7 % (ref 0–1.5)
BILIRUB SERPL-MCNC: 0.3 MG/DL (ref 0–1.2)
BUN SERPL-MCNC: 14 MG/DL (ref 6–20)
BUN/CREAT SERPL: 17.1 (ref 7–25)
CALCIUM SPEC-SCNC: 9.1 MG/DL (ref 8.6–10.5)
CHLORIDE SERPL-SCNC: 105 MMOL/L (ref 98–107)
CO2 SERPL-SCNC: 24.1 MMOL/L (ref 22–29)
CREAT SERPL-MCNC: 0.82 MG/DL (ref 0.76–1.27)
DEPRECATED RDW RBC AUTO: 45.4 FL (ref 37–54)
EOSINOPHIL # BLD AUTO: 0.36 10*3/MM3 (ref 0–0.4)
EOSINOPHIL NFR BLD AUTO: 4.3 % (ref 0.3–6.2)
ERYTHROCYTE [DISTWIDTH] IN BLOOD BY AUTOMATED COUNT: 12.4 % (ref 12.3–15.4)
GFR SERPL CREATININE-BSD FRML MDRD: 108 ML/MIN/1.73
GFR SERPL CREATININE-BSD FRML MDRD: 131 ML/MIN/1.73
GLOBULIN UR ELPH-MCNC: 2.8 GM/DL
GLUCOSE SERPL-MCNC: 125 MG/DL (ref 65–99)
HCT VFR BLD AUTO: 49.3 % (ref 37.5–51)
HGB BLD-MCNC: 15.6 G/DL (ref 13–17.7)
IMM GRANULOCYTES # BLD AUTO: 0.02 10*3/MM3 (ref 0–0.05)
IMM GRANULOCYTES NFR BLD AUTO: 0.2 % (ref 0–0.5)
LYMPHOCYTES # BLD AUTO: 1.85 10*3/MM3 (ref 0.7–3.1)
LYMPHOCYTES NFR BLD AUTO: 22.1 % (ref 19.6–45.3)
MCH RBC QN AUTO: 30.9 PG (ref 26.6–33)
MCHC RBC AUTO-ENTMCNC: 31.6 G/DL (ref 31.5–35.7)
MCV RBC AUTO: 97.6 FL (ref 79–97)
MONOCYTES # BLD AUTO: 0.63 10*3/MM3 (ref 0.1–0.9)
MONOCYTES NFR BLD AUTO: 7.5 % (ref 5–12)
NEUTROPHILS NFR BLD AUTO: 5.47 10*3/MM3 (ref 1.7–7)
NEUTROPHILS NFR BLD AUTO: 65.2 % (ref 42.7–76)
PLATELET # BLD AUTO: 210 10*3/MM3 (ref 140–450)
PMV BLD AUTO: 11.5 FL (ref 6–12)
POTASSIUM SERPL-SCNC: 4.4 MMOL/L (ref 3.5–5.2)
PROT SERPL-MCNC: 7.2 G/DL (ref 6–8.5)
RBC # BLD AUTO: 5.05 10*6/MM3 (ref 4.14–5.8)
SODIUM SERPL-SCNC: 139 MMOL/L (ref 136–145)
WBC NRBC COR # BLD: 8.39 10*3/MM3 (ref 3.4–10.8)

## 2021-12-08 PROCEDURE — 36415 COLL VENOUS BLD VENIPUNCTURE: CPT

## 2021-12-08 PROCEDURE — 85025 COMPLETE CBC W/AUTO DIFF WBC: CPT

## 2021-12-08 PROCEDURE — 80053 COMPREHEN METABOLIC PANEL: CPT

## 2021-12-09 ENCOUNTER — APPOINTMENT (OUTPATIENT)
Dept: LAB | Facility: HOSPITAL | Age: 31
End: 2021-12-09

## 2021-12-10 ENCOUNTER — SPECIALTY PHARMACY (OUTPATIENT)
Dept: PHARMACY | Facility: HOSPITAL | Age: 31
End: 2021-12-10

## 2021-12-15 LAB — REF LAB TEST METHOD: NORMAL

## 2021-12-16 ENCOUNTER — SPECIALTY PHARMACY (OUTPATIENT)
Dept: PHARMACY | Facility: HOSPITAL | Age: 31
End: 2021-12-16

## 2021-12-16 ENCOUNTER — APPOINTMENT (OUTPATIENT)
Dept: LAB | Facility: HOSPITAL | Age: 31
End: 2021-12-16

## 2021-12-16 ENCOUNTER — TELEMEDICINE (OUTPATIENT)
Dept: ONCOLOGY | Facility: CLINIC | Age: 31
End: 2021-12-16

## 2021-12-16 VITALS — WEIGHT: 315 LBS | BODY MASS INDEX: 39.29 KG/M2

## 2021-12-16 DIAGNOSIS — C92.10 CML (CHRONIC MYELOCYTIC LEUKEMIA) (HCC): Primary | ICD-10-CM

## 2021-12-16 DIAGNOSIS — Z79.899 HIGH RISK MEDICATION USE: ICD-10-CM

## 2021-12-16 DIAGNOSIS — Z91.14 POOR COMPLIANCE WITH MEDICATION: ICD-10-CM

## 2021-12-16 PROCEDURE — 99214 OFFICE O/P EST MOD 30 MIN: CPT | Performed by: NURSE PRACTITIONER
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
## 2021-12-20 ENCOUNTER — SPECIALTY PHARMACY (OUTPATIENT)
Dept: ONCOLOGY | Facility: HOSPITAL | Age: 31
End: 2021-12-20

## 2021-12-20 NOTE — PROGRESS NOTES
Specialty Note ( Sprycel)        Labs reviewed         12/8/2021   WBC 3.40 - 10.80 10*3/mm3 8.39   Neutrophils Absolute 1.70 - 7.00 10*3/mm3 5.47   Hemoglobin 13.0 - 17.7 g/dL 15.6   Hematocrit 37.5 - 51.0 % 49.3   Platelets 140 - 450 10*3/mm3 210   Creatinine 0.76 - 1.27 mg/dL 0.82   eGFR African Am >60 mL/min/1.73 131   eGFR Non African Am >60 mL/min/1.73 108   BUN 6 - 20 mg/dL 14   Sodium 136 - 145 mmol/L 139   Potassium 3.5 - 5.2 mmol/L 4.4   Glucose 65 - 99 mg/dL 125 (A)   Calcium 8.6 - 10.5 mg/dL 9.1   Albumin 3.50 - 5.20 g/dL 4.40   Total Protein 6.0 - 8.5 g/dL 7.2   AST (SGOT) 1 - 40 U/L 22   ALT (SGPT) 1 - 41 U/L 31   Alkaline Phosphatase 39 - 117 U/L 87   Total Bilirubin 0.0 - 1.2 mg/dL 0.3     APRN dictation is noted:  His BCR-ABL has increased to 1.418%. Compliance was discussed.

## 2022-01-04 ENCOUNTER — SPECIALTY PHARMACY (OUTPATIENT)
Dept: PHARMACY | Facility: HOSPITAL | Age: 32
End: 2022-01-04

## 2022-01-04 NOTE — PROGRESS NOTES
Specialty Note ( sprycel)      Chaitanya restarted his Sprycel 100 mg po daily in mid December.  His reported adherence today has been appropriate with no missing doses since restarting last month.  He had no side effects of concern to report and denies any other medication changes.

## 2022-02-23 ENCOUNTER — APPOINTMENT (OUTPATIENT)
Dept: LAB | Facility: HOSPITAL | Age: 32
End: 2022-02-23

## 2022-03-02 ENCOUNTER — APPOINTMENT (OUTPATIENT)
Dept: LAB | Facility: HOSPITAL | Age: 32
End: 2022-03-02

## 2022-03-08 ENCOUNTER — LAB (OUTPATIENT)
Dept: LAB | Facility: HOSPITAL | Age: 32
End: 2022-03-08

## 2022-03-08 DIAGNOSIS — C92.10 CML (CHRONIC MYELOCYTIC LEUKEMIA): ICD-10-CM

## 2022-03-08 DIAGNOSIS — Z91.14 POOR COMPLIANCE WITH MEDICATION: ICD-10-CM

## 2022-03-08 DIAGNOSIS — Z79.899 HIGH RISK MEDICATION USE: ICD-10-CM

## 2022-03-08 LAB
ALBUMIN SERPL-MCNC: 4.4 G/DL (ref 3.5–5.2)
ALBUMIN/GLOB SERPL: 1.8 G/DL
ALP SERPL-CCNC: 92 U/L (ref 39–117)
ALT SERPL W P-5'-P-CCNC: 28 U/L (ref 1–41)
ANION GAP SERPL CALCULATED.3IONS-SCNC: 7.1 MMOL/L (ref 5–15)
AST SERPL-CCNC: 21 U/L (ref 1–40)
BASOPHILS # BLD AUTO: 0.05 10*3/MM3 (ref 0–0.2)
BASOPHILS NFR BLD AUTO: 0.5 % (ref 0–1.5)
BILIRUB SERPL-MCNC: 0.3 MG/DL (ref 0–1.2)
BUN SERPL-MCNC: 14 MG/DL (ref 6–20)
BUN/CREAT SERPL: 13.6 (ref 7–25)
CALCIUM SPEC-SCNC: 9.2 MG/DL (ref 8.6–10.5)
CHLORIDE SERPL-SCNC: 103 MMOL/L (ref 98–107)
CO2 SERPL-SCNC: 28.9 MMOL/L (ref 22–29)
CREAT SERPL-MCNC: 1.03 MG/DL (ref 0.76–1.27)
DEPRECATED RDW RBC AUTO: 51 FL (ref 37–54)
EGFRCR SERPLBLD CKD-EPI 2021: 98.4 ML/MIN/1.73
EOSINOPHIL # BLD AUTO: 0.41 10*3/MM3 (ref 0–0.4)
EOSINOPHIL NFR BLD AUTO: 4.1 % (ref 0.3–6.2)
ERYTHROCYTE [DISTWIDTH] IN BLOOD BY AUTOMATED COUNT: 13.7 % (ref 12.3–15.4)
GLOBULIN UR ELPH-MCNC: 2.5 GM/DL
GLUCOSE SERPL-MCNC: 91 MG/DL (ref 65–99)
HCT VFR BLD AUTO: 44 % (ref 37.5–51)
HGB BLD-MCNC: 13.7 G/DL (ref 13–17.7)
IMM GRANULOCYTES # BLD AUTO: 0.05 10*3/MM3 (ref 0–0.05)
IMM GRANULOCYTES NFR BLD AUTO: 0.5 % (ref 0–0.5)
LYMPHOCYTES # BLD AUTO: 2.9 10*3/MM3 (ref 0.7–3.1)
LYMPHOCYTES NFR BLD AUTO: 29.3 % (ref 19.6–45.3)
MCH RBC QN AUTO: 31.4 PG (ref 26.6–33)
MCHC RBC AUTO-ENTMCNC: 31.1 G/DL (ref 31.5–35.7)
MCV RBC AUTO: 100.7 FL (ref 79–97)
MONOCYTES # BLD AUTO: 0.84 10*3/MM3 (ref 0.1–0.9)
MONOCYTES NFR BLD AUTO: 8.5 % (ref 5–12)
NEUTROPHILS NFR BLD AUTO: 5.65 10*3/MM3 (ref 1.7–7)
NEUTROPHILS NFR BLD AUTO: 57.1 % (ref 42.7–76)
NRBC BLD AUTO-RTO: 0 /100 WBC (ref 0–0.2)
PLATELET # BLD AUTO: 221 10*3/MM3 (ref 140–450)
PMV BLD AUTO: 11.3 FL (ref 6–12)
POTASSIUM SERPL-SCNC: 3.5 MMOL/L (ref 3.5–5.2)
PROT SERPL-MCNC: 6.9 G/DL (ref 6–8.5)
RBC # BLD AUTO: 4.37 10*6/MM3 (ref 4.14–5.8)
SODIUM SERPL-SCNC: 139 MMOL/L (ref 136–145)
WBC NRBC COR # BLD: 9.9 10*3/MM3 (ref 3.4–10.8)

## 2022-03-08 PROCEDURE — 80053 COMPREHEN METABOLIC PANEL: CPT

## 2022-03-08 PROCEDURE — 85025 COMPLETE CBC W/AUTO DIFF WBC: CPT

## 2022-03-08 PROCEDURE — 36415 COLL VENOUS BLD VENIPUNCTURE: CPT

## 2022-03-09 ENCOUNTER — SPECIALTY PHARMACY (OUTPATIENT)
Dept: PHARMACY | Facility: HOSPITAL | Age: 32
End: 2022-03-09

## 2022-03-15 ENCOUNTER — LAB (OUTPATIENT)
Dept: LAB | Facility: HOSPITAL | Age: 32
End: 2022-03-15

## 2022-03-15 ENCOUNTER — OFFICE VISIT (OUTPATIENT)
Dept: ONCOLOGY | Facility: CLINIC | Age: 32
End: 2022-03-15

## 2022-03-15 VITALS
TEMPERATURE: 99.8 F | OXYGEN SATURATION: 93 % | BODY MASS INDEX: 36.45 KG/M2 | RESPIRATION RATE: 16 BRPM | WEIGHT: 315 LBS | HEIGHT: 78 IN | SYSTOLIC BLOOD PRESSURE: 146 MMHG | HEART RATE: 80 BPM | DIASTOLIC BLOOD PRESSURE: 80 MMHG

## 2022-03-15 DIAGNOSIS — C92.11 CHRONIC MYELOID LEUKEMIA, BCR/ABL-POSITIVE, IN REMISSION: ICD-10-CM

## 2022-03-15 DIAGNOSIS — Z51.81 THERAPEUTIC DRUG MONITORING: Primary | ICD-10-CM

## 2022-03-15 LAB — REF LAB TEST METHOD: NORMAL

## 2022-03-15 PROCEDURE — 99214 OFFICE O/P EST MOD 30 MIN: CPT | Performed by: INTERNAL MEDICINE

## 2022-03-15 RX ORDER — BUPRENORPHINE HYDROCHLORIDE AND NALOXONE HYDROCHLORIDE DIHYDRATE 8; 2 MG/1; MG/1
1 TABLET SUBLINGUAL DAILY
COMMUNITY

## 2022-03-15 RX ORDER — BUPRENORPHINE HYDROCHLORIDE AND NALOXONE HYDROCHLORIDE DIHYDRATE 8; 2 MG/1; MG/1
TABLET SUBLINGUAL
COMMUNITY
Start: 2022-03-14 | End: 2022-03-15 | Stop reason: SDUPTHER
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
## 2022-03-17 ENCOUNTER — SPECIALTY PHARMACY (OUTPATIENT)
Dept: PHARMACY | Facility: HOSPITAL | Age: 32
End: 2022-03-17

## 2022-03-17 NOTE — PROGRESS NOTES
Specialty Note ( Dasatinib- since 2014)      Labs reviewed        3/8/2022   WBC 3.40 - 10.80 10*3/mm3 9.90   Neutrophils Absolute 1.70 - 7.00 10*3/mm3 5.65   Hemoglobin 13.0 - 17.7 g/dL 13.7   Hematocrit 37.5 - 51.0 % 44.0   Platelets 140 - 450 10*3/mm3 221   Creatinine 0.76 - 1.27 mg/dL 1.03   BUN 6 - 20 mg/dL 14   Sodium 136 - 145 mmol/L 139   Potassium 3.5 - 5.2 mmol/L 3.5   Glucose 65 - 99 mg/dL 91   Calcium 8.6 - 10.5 mg/dL 9.2   Albumin 3.50 - 5.20 g/dL 4.40   Total Protein 6.0 - 8.5 g/dL 6.9   AST (SGOT) 1 - 40 U/L 21   ALT (SGPT) 1 - 41 U/L 28   Alkaline Phosphatase 39 - 117 U/L 92   Total Bilirubin 0.0 - 1.2 mg/dL 0.3     Dr Mcbride's dictation is noted    The patient has periodic noncompliance/nonadherence to medication with recent worsening of the BCR-ABL PCR to 1.418% 12/8/2021. the patient has been more compliant with therapy and his current PCR has improved 0.027% I-S.  This is consistent with goal/molecular remission.  We will continue dasatinib 100 mg daily.  Continued compliance recommended to the patient.

## 2022-03-21 ENCOUNTER — SPECIALTY PHARMACY (OUTPATIENT)
Dept: PHARMACY | Facility: HOSPITAL | Age: 32
End: 2022-03-21

## 2022-03-21 RX ORDER — DASATINIB 100 MG/1
TABLET ORAL
Qty: 30 TABLET | Refills: 5 | Status: SHIPPED | OUTPATIENT
Start: 2022-03-21

## 2022-04-29 ENCOUNTER — HOSPITAL ENCOUNTER (EMERGENCY)
Facility: HOSPITAL | Age: 32
Discharge: HOME | End: 2022-04-29
Attending: EMERGENCY MEDICINE | Admitting: EMERGENCY MEDICINE

## 2022-04-29 VITALS
TEMPERATURE: 99.1 F | OXYGEN SATURATION: 99 % | RESPIRATION RATE: 20 BRPM | SYSTOLIC BLOOD PRESSURE: 149 MMHG | HEIGHT: 78 IN | HEART RATE: 106 BPM | WEIGHT: 315 LBS | DIASTOLIC BLOOD PRESSURE: 81 MMHG | BODY MASS INDEX: 36.45 KG/M2

## 2022-04-29 DIAGNOSIS — R11.2 NAUSEA AND VOMITING, UNSPECIFIED VOMITING TYPE: Primary | ICD-10-CM

## 2022-04-29 LAB
ALBUMIN SERPL-MCNC: 4.1 G/DL (ref 3.5–5.2)
ALBUMIN/GLOB SERPL: 1.5 G/DL
ALP SERPL-CCNC: 89 U/L (ref 39–117)
ALT SERPL W P-5'-P-CCNC: 18 U/L (ref 1–41)
ANION GAP SERPL CALCULATED.3IONS-SCNC: 10.5 MMOL/L (ref 5–15)
AST SERPL-CCNC: 20 U/L (ref 1–40)
BASOPHILS # BLD AUTO: 0.02 10*3/MM3 (ref 0–0.2)
BASOPHILS NFR BLD AUTO: 0.2 % (ref 0–1.5)
BILIRUB SERPL-MCNC: 0.5 MG/DL (ref 0–1.2)
BUN SERPL-MCNC: 14 MG/DL (ref 6–20)
BUN/CREAT SERPL: 16.1 (ref 7–25)
CALCIUM SPEC-SCNC: 8.6 MG/DL (ref 8.6–10.5)
CHLORIDE SERPL-SCNC: 103 MMOL/L (ref 98–107)
CO2 SERPL-SCNC: 25.5 MMOL/L (ref 22–29)
CREAT SERPL-MCNC: 0.87 MG/DL (ref 0.76–1.27)
DEPRECATED RDW RBC AUTO: 45.9 FL (ref 37–54)
EGFRCR SERPLBLD CKD-EPI 2021: 116.8 ML/MIN/1.73
EOSINOPHIL # BLD AUTO: 0.17 10*3/MM3 (ref 0–0.4)
EOSINOPHIL NFR BLD AUTO: 1.8 % (ref 0.3–6.2)
ERYTHROCYTE [DISTWIDTH] IN BLOOD BY AUTOMATED COUNT: 12.9 % (ref 12.3–15.4)
FLUAV RNA RESP QL NAA+PROBE: NOT DETECTED
FLUBV RNA RESP QL NAA+PROBE: NOT DETECTED
GLOBULIN UR ELPH-MCNC: 2.8 GM/DL
GLUCOSE SERPL-MCNC: 108 MG/DL (ref 65–99)
HCT VFR BLD AUTO: 42.9 % (ref 37.5–51)
HGB BLD-MCNC: 14.1 G/DL (ref 13–17.7)
IMM GRANULOCYTES # BLD AUTO: 0.02 10*3/MM3 (ref 0–0.05)
IMM GRANULOCYTES NFR BLD AUTO: 0.2 % (ref 0–0.5)
LYMPHOCYTES # BLD AUTO: 0.73 10*3/MM3 (ref 0.7–3.1)
LYMPHOCYTES NFR BLD AUTO: 7.9 % (ref 19.6–45.3)
MCH RBC QN AUTO: 32.3 PG (ref 26.6–33)
MCHC RBC AUTO-ENTMCNC: 32.9 G/DL (ref 31.5–35.7)
MCV RBC AUTO: 98.2 FL (ref 79–97)
MONOCYTES # BLD AUTO: 0.56 10*3/MM3 (ref 0.1–0.9)
MONOCYTES NFR BLD AUTO: 6.1 % (ref 5–12)
NEUTROPHILS NFR BLD AUTO: 7.74 10*3/MM3 (ref 1.7–7)
NEUTROPHILS NFR BLD AUTO: 83.8 % (ref 42.7–76)
NRBC BLD AUTO-RTO: 0 /100 WBC (ref 0–0.2)
PLATELET # BLD AUTO: 174 10*3/MM3 (ref 140–450)
PMV BLD AUTO: 11.9 FL (ref 6–12)
POTASSIUM SERPL-SCNC: 3.7 MMOL/L (ref 3.5–5.2)
PROT SERPL-MCNC: 6.9 G/DL (ref 6–8.5)
RBC # BLD AUTO: 4.37 10*6/MM3 (ref 4.14–5.8)
SARS-COV-2 RNA RESP QL NAA+PROBE: NOT DETECTED
SODIUM SERPL-SCNC: 139 MMOL/L (ref 136–145)
WBC NRBC COR # BLD: 9.24 10*3/MM3 (ref 3.4–10.8)

## 2022-04-29 PROCEDURE — 96374 THER/PROPH/DIAG INJ IV PUSH: CPT

## 2022-04-29 PROCEDURE — 25010000002 ONDANSETRON PER 1 MG

## 2022-04-29 PROCEDURE — 87636 SARSCOV2 & INF A&B AMP PRB: CPT

## 2022-04-29 PROCEDURE — 85025 COMPLETE CBC W/AUTO DIFF WBC: CPT

## 2022-04-29 PROCEDURE — 80053 COMPREHEN METABOLIC PANEL: CPT

## 2022-04-29 PROCEDURE — 99282 EMERGENCY DEPT VISIT SF MDM: CPT

## 2022-04-29 PROCEDURE — 99283 EMERGENCY DEPT VISIT LOW MDM: CPT

## 2022-04-29 RX ORDER — ONDANSETRON 4 MG/1
8 TABLET, ORALLY DISINTEGRATING ORAL EVERY 8 HOURS PRN
Qty: 30 TABLET | Refills: 0 | Status: SHIPPED | OUTPATIENT
Start: 2022-04-29 | End: 2022-05-04

## 2022-04-29 RX ORDER — ACETAMINOPHEN 500 MG
1000 TABLET ORAL ONCE
Status: COMPLETED | OUTPATIENT
Start: 2022-04-29 | End: 2022-04-29

## 2022-04-29 RX ORDER — LOPERAMIDE HYDROCHLORIDE 2 MG/1
2 CAPSULE ORAL 3 TIMES DAILY
Qty: 15 CAPSULE | Refills: 0 | Status: SHIPPED | OUTPATIENT
Start: 2022-04-29 | End: 2022-05-04

## 2022-04-29 RX ORDER — SODIUM CHLORIDE 0.9 % (FLUSH) 0.9 %
10 SYRINGE (ML) INJECTION AS NEEDED
Status: DISCONTINUED | OUTPATIENT
Start: 2022-04-29 | End: 2022-04-29 | Stop reason: HOSPADM

## 2022-04-29 RX ORDER — ONDANSETRON 2 MG/ML
8 INJECTION INTRAMUSCULAR; INTRAVENOUS ONCE
Status: COMPLETED | OUTPATIENT
Start: 2022-04-29 | End: 2022-04-29

## 2022-04-29 RX ADMIN — ONDANSETRON 8 MG: 2 INJECTION INTRAMUSCULAR; INTRAVENOUS at 19:01

## 2022-04-29 RX ADMIN — ACETAMINOPHEN 1000 MG: 500 TABLET ORAL at 19:01

## 2022-04-29 RX ADMIN — SODIUM CHLORIDE 500 ML: 9 INJECTION, SOLUTION INTRAVENOUS at 19:20

## 2022-06-07 ENCOUNTER — SPECIALTY PHARMACY (OUTPATIENT)
Dept: PHARMACY | Facility: HOSPITAL | Age: 32
End: 2022-06-07

## 2022-06-07 NOTE — PROGRESS NOTES
[facility]: Toxicity Assessment (Sprycel)    Attempted to contact patient with no answer and referred to [facility] direct line ([phone]).  Of note, patient's home pharmacy ([phone]) was contacted to verify patient's recent dispense history for Sprycel which was as follows: 5/31/22, 3/23/22, and 12/28/22 each for a 30 day supply. Pharmacy will follow up when patient is reached.    Enio Maldonado, PharmD Candidate 2023

## 2022-06-27 ENCOUNTER — SPECIALTY PHARMACY (OUTPATIENT)
Dept: PHARMACY | Facility: HOSPITAL | Age: 32
End: 2022-06-27

## 2022-06-27 NOTE — PROGRESS NOTES
MTM Encounter-Re: Adherence and side effects (Sprycel)    Today's encounter was conducted via telephone.    Medication:  Sprycel (dasatinib)  - Reason for outreach: Routine medication check-in .  - Administration: Patient is taking every day at the same time .  - Missed doses: Patient reports missing 0 doses in the last 30 days.  - Self-administration: Patient demonstrates ability to self-administer medication. No barriers to adherence identified.   - Diagnosis/Indication: CML. Progress toward achieving therapeutic goals reviewed.   - Patient denies side effects.    - Medication availability/affordability: Patient has had no issues obtaining medication from pharmacy.   - Questions/concerns about medications: No questions asked during phone call.        Completed medication reconciliation today to assess for drug interactions.   Reviewed allergies, medical history, labs, quality of life, and medication history with patient.   Patient denies starting or stopping any medications.  Assessed medication list for interactions, no significant drug interactions noted.   Advised pt to call the clinic if any new medications are started so we can assess for drug-drug interactions.     All questions addressed. Patient had no additional concerns for MTM office.     Pharmacy to follow,     WERNER MARTINEZ, Pharmacy Intern  6/27/2022  10:56 EDT

## 2022-08-31 ENCOUNTER — APPOINTMENT (OUTPATIENT)
Dept: LAB | Facility: HOSPITAL | Age: 32
End: 2022-08-31

## 2022-08-31 ENCOUNTER — LAB (OUTPATIENT)
Dept: LAB | Facility: HOSPITAL | Age: 32
End: 2022-08-31

## 2022-08-31 DIAGNOSIS — C92.11 CHRONIC MYELOID LEUKEMIA, BCR/ABL-POSITIVE, IN REMISSION: ICD-10-CM

## 2022-08-31 DIAGNOSIS — Z51.81 THERAPEUTIC DRUG MONITORING: ICD-10-CM

## 2022-08-31 LAB
ALBUMIN SERPL-MCNC: 4.7 G/DL (ref 3.5–5.2)
ALBUMIN/GLOB SERPL: 2 G/DL
ALP SERPL-CCNC: 91 U/L (ref 39–117)
ALT SERPL W P-5'-P-CCNC: 21 U/L (ref 1–41)
ANION GAP SERPL CALCULATED.3IONS-SCNC: 9.1 MMOL/L (ref 5–15)
AST SERPL-CCNC: 20 U/L (ref 1–40)
BASOPHILS # BLD AUTO: 0.05 10*3/MM3 (ref 0–0.2)
BASOPHILS NFR BLD AUTO: 0.5 % (ref 0–1.5)
BILIRUB SERPL-MCNC: 0.3 MG/DL (ref 0–1.2)
BUN SERPL-MCNC: 11 MG/DL (ref 6–20)
BUN/CREAT SERPL: 12.8 (ref 7–25)
CALCIUM SPEC-SCNC: 9 MG/DL (ref 8.6–10.5)
CHLORIDE SERPL-SCNC: 107 MMOL/L (ref 98–107)
CO2 SERPL-SCNC: 25.9 MMOL/L (ref 22–29)
CREAT SERPL-MCNC: 0.86 MG/DL (ref 0.76–1.27)
DEPRECATED RDW RBC AUTO: 46.5 FL (ref 37–54)
EGFRCR SERPLBLD CKD-EPI 2021: 116.5 ML/MIN/1.73
EOSINOPHIL # BLD AUTO: 0.32 10*3/MM3 (ref 0–0.4)
EOSINOPHIL NFR BLD AUTO: 3.5 % (ref 0.3–6.2)
ERYTHROCYTE [DISTWIDTH] IN BLOOD BY AUTOMATED COUNT: 13.1 % (ref 12.3–15.4)
GLOBULIN UR ELPH-MCNC: 2.3 GM/DL
GLUCOSE SERPL-MCNC: 100 MG/DL (ref 65–99)
HCT VFR BLD AUTO: 44.7 % (ref 37.5–51)
HGB BLD-MCNC: 14.7 G/DL (ref 13–17.7)
IMM GRANULOCYTES # BLD AUTO: 0.05 10*3/MM3 (ref 0–0.05)
IMM GRANULOCYTES NFR BLD AUTO: 0.5 % (ref 0–0.5)
LYMPHOCYTES # BLD AUTO: 2.83 10*3/MM3 (ref 0.7–3.1)
LYMPHOCYTES NFR BLD AUTO: 31 % (ref 19.6–45.3)
MCH RBC QN AUTO: 31.9 PG (ref 26.6–33)
MCHC RBC AUTO-ENTMCNC: 32.9 G/DL (ref 31.5–35.7)
MCV RBC AUTO: 97 FL (ref 79–97)
MONOCYTES # BLD AUTO: 0.63 10*3/MM3 (ref 0.1–0.9)
MONOCYTES NFR BLD AUTO: 6.9 % (ref 5–12)
NEUTROPHILS NFR BLD AUTO: 5.25 10*3/MM3 (ref 1.7–7)
NEUTROPHILS NFR BLD AUTO: 57.6 % (ref 42.7–76)
NRBC BLD AUTO-RTO: 0 /100 WBC (ref 0–0.2)
PLATELET # BLD AUTO: 188 10*3/MM3 (ref 140–450)
PMV BLD AUTO: 12 FL (ref 6–12)
POTASSIUM SERPL-SCNC: 4 MMOL/L (ref 3.5–5.2)
PROT SERPL-MCNC: 7 G/DL (ref 6–8.5)
RBC # BLD AUTO: 4.61 10*6/MM3 (ref 4.14–5.8)
SODIUM SERPL-SCNC: 142 MMOL/L (ref 136–145)
WBC NRBC COR # BLD: 9.13 10*3/MM3 (ref 3.4–10.8)

## 2022-08-31 PROCEDURE — 36415 COLL VENOUS BLD VENIPUNCTURE: CPT

## 2022-08-31 PROCEDURE — 80053 COMPREHEN METABOLIC PANEL: CPT

## 2022-08-31 PROCEDURE — 85025 COMPLETE CBC W/AUTO DIFF WBC: CPT

## 2022-09-12 LAB — REF LAB TEST METHOD: NORMAL

## 2022-09-14 ENCOUNTER — APPOINTMENT (OUTPATIENT)
Dept: LAB | Facility: HOSPITAL | Age: 32
End: 2022-09-14

## 2022-09-14 ENCOUNTER — TELEMEDICINE (OUTPATIENT)
Dept: ONCOLOGY | Facility: CLINIC | Age: 32
End: 2022-09-14

## 2022-09-14 VITALS — WEIGHT: 315 LBS | BODY MASS INDEX: 36.86 KG/M2

## 2022-09-14 DIAGNOSIS — Z79.899 HIGH RISK MEDICATION USE: ICD-10-CM

## 2022-09-14 DIAGNOSIS — C92.10 CML (CHRONIC MYELOCYTIC LEUKEMIA): Primary | ICD-10-CM

## 2022-09-14 PROCEDURE — 99214 OFFICE O/P EST MOD 30 MIN: CPT | Performed by: NURSE PRACTITIONER
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
## 2022-09-15 ENCOUNTER — SPECIALTY PHARMACY (OUTPATIENT)
Dept: PHARMACY | Facility: HOSPITAL | Age: 32
End: 2022-09-15

## 2022-09-15 NOTE — PROGRESS NOTES
Specialty Note ( Dasatinib)      APRN dictation is noted    Plan:  He will continue Dasatinib 100 mg daily      8/31 BCR ABL not detected        8/31/2022   WBC 3.40 - 10.80 10*3/mm3 9.13   Neutrophils Absolute 1.70 - 7.00 10*3/mm3 5.25   Hemoglobin 13.0 - 17.7 g/dL 14.7   Hematocrit 37.5 - 51.0 % 44.7   Platelets 140 - 450 10*3/mm3 188   Creatinine 0.76 - 1.27 mg/dL 0.86   BUN 6 - 20 mg/dL 11   Sodium 136 - 145 mmol/L 142   Potassium 3.5 - 5.2 mmol/L 4.0   Glucose 65 - 99 mg/dL 100 (A)   Calcium 8.6 - 10.5 mg/dL 9.0   Albumin 3.50 - 5.20 g/dL 4.70   Total Protein 6.0 - 8.5 g/dL 7.0   AST (SGOT) 1 - 40 U/L 20   ALT (SGPT) 1 - 41 U/L 21   Alkaline Phosphatase 39 - 117 U/L 91   Total Bilirubin 0.0 - 1.2 mg/dL 0.3

## 2022-12-19 ENCOUNTER — SPECIALTY PHARMACY (OUTPATIENT)
Dept: PHARMACY | Facility: HOSPITAL | Age: 32
End: 2022-12-19

## 2023-02-07 ENCOUNTER — SPECIALTY PHARMACY (OUTPATIENT)
Dept: PHARMACY | Facility: HOSPITAL | Age: 33
End: 2023-02-07
Payer: COMMERCIAL

## 2023-02-07 NOTE — PROGRESS NOTES
Specialty Note ( dasatinib)      Called to assess adherence and side effects- call was cut off.  Noted there have been no requests for refills since rx sent in 3/22- will in basket Dr Mcbride.

## 2023-02-09 ENCOUNTER — SPECIALTY PHARMACY (OUTPATIENT)
Dept: PHARMACY | Facility: HOSPITAL | Age: 33
End: 2023-02-09
Payer: COMMERCIAL

## 2023-02-22 ENCOUNTER — PRIOR AUTHORIZATION (OUTPATIENT)
Dept: ONCOLOGY | Facility: CLINIC | Age: 33
End: 2023-02-22
Payer: COMMERCIAL

## 2023-02-22 NOTE — TELEPHONE ENCOUNTER
No PA needed for BCR/ABL by PCR 34621 and [id number] per [employer] code . Ok'd lab to send to [facility] today.

## 2023-02-22 NOTE — TELEPHONE ENCOUNTER
Correction patient did not have labs drawn today. This is for future appointment on 3/1/23. Informed lab good to send on 3/1/23.

## 2023-03-10 ENCOUNTER — TELEPHONE (OUTPATIENT)
Dept: ONCOLOGY | Facility: CLINIC | Age: 33
End: 2023-03-10
Payer: COMMERCIAL

## 2023-03-10 NOTE — TELEPHONE ENCOUNTER
----- Message from Marshall Astudillo RN sent at 3/10/2023 10:16 AM EST -----  Regarding: FW: was a no show for labs he needed  Please reschedule labs prior to visit 3/15  ----- Message -----  From: Paula Haas  Sent: 3/10/2023  10:13 AM EST  To: Marshall Astudillo RN  Subject: was a no show for labs he needed                 Appt 3/15

## 2023-03-15 ENCOUNTER — APPOINTMENT (OUTPATIENT)
Dept: ONCOLOGY | Facility: HOSPITAL | Age: 33
End: 2023-03-15
Payer: COMMERCIAL

## 2023-04-11 ENCOUNTER — TELEPHONE (OUTPATIENT)
Dept: ONCOLOGY | Facility: CLINIC | Age: 33
End: 2023-04-11
Payer: COMMERCIAL

## 2023-04-11 NOTE — TELEPHONE ENCOUNTER
----- Message from Marshall Astudillo RN sent at 4/7/2023  8:52 AM EDT -----  Regarding: FW: KARINA appt  4/13  I guess we can just reach out once again to reschedule?  ----- Message -----  From: Paula Haas  Sent: 4/6/2023   2:11 PM EDT  To: Marshall Astudillo RN, #  Subject: JAMESI appt  4/13                                   Missed lab again he keeps missing

## 2023-04-13 ENCOUNTER — APPOINTMENT (OUTPATIENT)
Dept: ONCOLOGY | Facility: HOSPITAL | Age: 33
End: 2023-04-13
Payer: COMMERCIAL

## 2023-04-14 ENCOUNTER — SPECIALTY PHARMACY (OUTPATIENT)
Dept: PHARMACY | Facility: HOSPITAL | Age: 33
End: 2023-04-14
Payer: COMMERCIAL

## 2023-04-20 ENCOUNTER — SPECIALTY PHARMACY (OUTPATIENT)
Dept: PHARMACY | Facility: HOSPITAL | Age: 33
End: 2023-04-20
Payer: COMMERCIAL

## 2023-04-21 ENCOUNTER — TELEPHONE (OUTPATIENT)
Dept: ONCOLOGY | Facility: CLINIC | Age: 33
End: 2023-04-21
Payer: COMMERCIAL

## 2023-04-21 ENCOUNTER — SPECIALTY PHARMACY (OUTPATIENT)
Dept: PHARMACY | Facility: HOSPITAL | Age: 33
End: 2023-04-21
Payer: COMMERCIAL

## 2023-04-27 ENCOUNTER — SPECIALTY PHARMACY (OUTPATIENT)
Dept: PHARMACY | Facility: HOSPITAL | Age: 33
End: 2023-04-27
Payer: COMMERCIAL

## 2023-05-04 ENCOUNTER — SPECIALTY PHARMACY (OUTPATIENT)
Dept: PHARMACY | Facility: HOSPITAL | Age: 33
End: 2023-05-04
Payer: COMMERCIAL

## 2023-06-05 ENCOUNTER — SPECIALTY PHARMACY (OUTPATIENT)
Dept: PHARMACY | Facility: HOSPITAL | Age: 33
End: 2023-06-05
Payer: COMMERCIAL

## 2023-08-01 ENCOUNTER — SPECIALTY PHARMACY (OUTPATIENT)
Dept: PHARMACY | Facility: HOSPITAL | Age: 33
End: 2023-08-01
Payer: COMMERCIAL

## 2023-08-08 ENCOUNTER — SPECIALTY PHARMACY (OUTPATIENT)
Dept: PHARMACY | Facility: HOSPITAL | Age: 33
End: 2023-08-08
Payer: COMMERCIAL

## 2023-08-08 ENCOUNTER — TELEPHONE (OUTPATIENT)
Dept: ONCOLOGY | Facility: CLINIC | Age: 33
End: 2023-08-08
Payer: COMMERCIAL

## 2023-08-21 ENCOUNTER — SPECIALTY PHARMACY (OUTPATIENT)
Dept: PHARMACY | Facility: HOSPITAL | Age: 33
End: 2023-08-21
Payer: COMMERCIAL

## 2023-08-22 ENCOUNTER — TELEPHONE (OUTPATIENT)
Dept: ONCOLOGY | Facility: CLINIC | Age: 33
End: 2023-08-22

## 2023-08-22 NOTE — TELEPHONE ENCOUNTER
"    Caller: Chaitanya Canales \"Nemesio\"    Relationship to patient: Self    Best call back number: [phone]     Chief complaint: PATIENT REQUESTING TO SCHEDULE LABS AND FU AT [facility] OFFICE    Type of visit: LABS AND FU    Requested date: NEXT AVAILABLE. PATIENT HAS NOT BEEN IN OFFICE FOR 8 MONTHS      "

## 2023-08-23 ENCOUNTER — TRANSCRIBE ORDERS (OUTPATIENT)
Dept: ADMINISTRATIVE | Facility: HOSPITAL | Age: 33
End: 2023-08-23
Payer: COMMERCIAL

## 2023-08-23 DIAGNOSIS — Z86.16 HISTORY OF COVID-19: ICD-10-CM

## 2023-08-23 DIAGNOSIS — R06.02 SOB (SHORTNESS OF BREATH): Primary | ICD-10-CM

## 2023-08-30 ENCOUNTER — HOSPITAL ENCOUNTER (OUTPATIENT)
Dept: PULMONOLOGY | Facility: HOSPITAL | Age: 33
Discharge: HOME | End: 2023-08-30
Payer: COMMERCIAL

## 2023-08-30 VITALS — HEART RATE: 69 BPM | OXYGEN SATURATION: 97 % | RESPIRATION RATE: 16 BRPM

## 2023-08-30 DIAGNOSIS — Z86.16 HISTORY OF COVID-19: ICD-10-CM

## 2023-08-30 DIAGNOSIS — R06.02 SOB (SHORTNESS OF BREATH): ICD-10-CM

## 2023-08-30 LAB
BDY SITE: ABNORMAL
HGB BLDA-MCNC: 17.1 G/DL (ref 14–18)
Lab: ABNORMAL
SAO2 % BLDCOA: 93.7 % (ref 94–99)

## 2023-08-30 PROCEDURE — 94729 DIFFUSING CAPACITY: CPT

## 2023-08-30 PROCEDURE — 94726 PLETHYSMOGRAPHY LUNG VOLUMES: CPT

## 2023-08-30 PROCEDURE — 94060 EVALUATION OF WHEEZING: CPT

## 2023-08-30 PROCEDURE — 82820 HEMOGLOBIN-OXYGEN AFFINITY: CPT

## 2023-08-30 RX ORDER — ALBUTEROL SULFATE 2.5 MG/3ML
2.5 SOLUTION RESPIRATORY (INHALATION) ONCE
Status: COMPLETED | OUTPATIENT
Start: 2023-08-30 | End: 2023-08-30

## 2023-08-30 RX ADMIN — ALBUTEROL SULFATE 2.5 MG: 2.5 SOLUTION RESPIRATORY (INHALATION) at 07:32

## 2023-09-06 ENCOUNTER — LAB (OUTPATIENT)
Dept: LAB | Facility: HOSPITAL | Age: 33
End: 2023-09-06
Payer: COMMERCIAL

## 2023-09-06 PROCEDURE — 80053 COMPREHEN METABOLIC PANEL: CPT | Performed by: NURSE PRACTITIONER

## 2023-09-06 PROCEDURE — 85025 COMPLETE CBC W/AUTO DIFF WBC: CPT | Performed by: NURSE PRACTITIONER
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
## 2023-09-20 ENCOUNTER — SPECIALTY PHARMACY (OUTPATIENT)
Dept: PHARMACY | Facility: HOSPITAL | Age: 33
End: 2023-09-20
Payer: COMMERCIAL

## 2023-09-20 ENCOUNTER — LAB (OUTPATIENT)
Dept: LAB | Facility: HOSPITAL | Age: 33
End: 2023-09-20
Payer: COMMERCIAL

## 2023-09-20 ENCOUNTER — OFFICE VISIT (OUTPATIENT)
Dept: ONCOLOGY | Facility: CLINIC | Age: 33
End: 2023-09-20
Payer: COMMERCIAL

## 2023-09-20 VITALS
SYSTOLIC BLOOD PRESSURE: 159 MMHG | WEIGHT: 315 LBS | OXYGEN SATURATION: 96 % | HEART RATE: 84 BPM | RESPIRATION RATE: 16 BRPM | TEMPERATURE: 97.7 F | HEIGHT: 72 IN | DIASTOLIC BLOOD PRESSURE: 95 MMHG | BODY MASS INDEX: 42.66 KG/M2

## 2023-09-20 DIAGNOSIS — C92.12 CHRONIC MYELOID LEUKEMIA, BCR/ABL-POSITIVE, IN RELAPSE: Primary | ICD-10-CM

## 2023-09-20 PROCEDURE — 99214 OFFICE O/P EST MOD 30 MIN: CPT | Performed by: INTERNAL MEDICINE

## 2023-09-20 PROCEDURE — 1125F AMNT PAIN NOTED PAIN PRSNT: CPT | Performed by: INTERNAL MEDICINE

## 2023-09-20 NOTE — PROGRESS NOTES
"Specialty Pharmacy Refill Coordination Note     Chaitanya \"Nemesio\" is a 33 y.o. male contacted today regarding refills of  Sprycel specialty medication(s).    Reviewed and verified with patient:       Specialty medication(s) and dose(s) confirmed: yes        Delivery Questions      Flowsheet Row Most Recent Value   Delivery method  at Pharmacy  [Patient will p/u Sprycel from [facility] pharmacy on 9/21. $0 co-pay.]   Delivery address correct? Yes  [N/A]   Delivery phone number [phone]   Number of medications in delivery 1   Medication being filled and delivered Sprycel   Doses left of specialty medications 0   Is there any medication that is due not being filled? No   Supplies needed? No supplies needed   Cooler needed? No   Do any medications need mixed or dated? No   Copay form of payment Payment plan already set up   Additional comments N/A   Questions or concerns for the pharmacist? No   Explain any questions or concerns for the pharmacist N/A   Are any medications first time fills? No              Medication Adherence    Adherence tools used: directed education  Support network for adherence: healthcare provider          Follow-up: 3 week(s)     Sarah Cuevas, Pharmacy Technician  Specialty Pharmacy Technician        "

## 2023-09-20 NOTE — PROGRESS NOTES
Re: Refills of Oral Specialty Medication - Sprycel (dasatinib)    Drug-Drug Interactions: The current medication list was reviewed and there are no relevant drug-drug interactions with the specialty medication.  Medication Allergies: The patient has no relevant allergies as it relates to their oral specialty medication  Review of Labs/Dose Adjustments: NO DOSE CHANGE - I reviewed the most recent note and labs and the patient will continue without any dose changes.  I sent refills as described below.    Drug: Sprycel (dasatinib)  Strength: 100 mg  Directions: Take one tablet by mouth daily  Quantity: 30  Refills: 5  Pharmacy prescription sent to:  [facility]  Specialty Pharmacy    Name/Credentials: Maggie Rogel, MoyD, BCPS    9/20/2023  10:18 EDT

## 2023-09-20 NOTE — PROGRESS NOTES
Drug: Sprycel (dasatinib)  Strength: 100 mg  Directions: Take one tablet by mouth daily  Quantity: 30  Refills: 5  Pharmacy prescription sent to:  [facility]  Specialty Pharmacy    Completed independent double check on medication order/RX.  Zia Lang, MoyD, BCOP

## 2023-09-22 ENCOUNTER — SPECIALTY PHARMACY (OUTPATIENT)
Dept: ONCOLOGY | Facility: HOSPITAL | Age: 33
End: 2023-09-22
Payer: COMMERCIAL

## 2023-09-22 NOTE — PROGRESS NOTES
Specialty Pharmacy Patient Management Program  Oncology 6-Month Clinical Assessment       Chaitanya Canales is a 33 y.o. male with molecular relapse of CML seen today to assess adherence and side effects.    Reason for Outreach: Routine medication check-in .    Regimen: Sprycel 100 mg po daily    Specialty Pharmacy Goal   Goals Addressed Today        Specialty Pharmacy General Goal      Undetectable BCR ABL            Problem List   Problem list reviewed by Ginette Hannah [facility] on 9/22/2023 at  8:01 AM  Patient Active Problem List   Diagnosis Code    Chronic myeloid leukemia, BCR/ABL-positive, in relapse C92.12    Therapeutic drug monitoring Z51.81    Facial cellulitis L03.211    Displaced longitudinal fracture of right patella S82.021A    Status post open reduction with internal fixation of patella fracture, 01/18/2020 Z98.890, Z87.81    Right patella fracture S82.001A    Closed fracture of right patella S82.001A    S/P orthopedic surgery, follow-up exam, S/P hardware removal right patella, partial patellectomy, DOS 03/13/2020 Z09       Medication Assessment for Specialty Medication(s):  Medication Assessment  Follow Up Clinical Assessment  Medication(s) assessed: Sprycel  Therapeutic appropriateness: Appropriate  Medication tolerability: Tolerating with no to minimal ADRs  Medication plan: Continue therapy with normal follow-up  Quality of Life Improvement Scale: A good deal better  Comments on Quality of Life: No issues reported today  Administration:  he started retaking medication 9/20 .   Patient can self administer medications: yes  Medication Follow-Up Plan: Next clinical assessment  Lab Review: The labs listed below have been reviewed. No dose adjustments are needed for the oral specialty medication(s) based on the labs.    Lab Results   Component Value Date    GLUCOSE 103 (H) 09/06/2023    CALCIUM 9.0 09/06/2023     09/06/2023    K 3.8 09/06/2023    CO2 23.1 09/06/2023     09/06/2023    BUN 15  09/06/2023    CREATININE 0.99 09/06/2023    EGFRIFAFRI 131 12/08/2021    EGFRIFNONA 108 12/08/2021    BCR 15.2 09/06/2023    ANIONGAP 11.9 09/06/2023     Lab Results   Component Value Date    WBC 10.42 09/06/2023    RBC 5.26 09/06/2023    HGB 16.3 09/06/2023    HCT 50.3 09/06/2023    MCV 95.6 09/06/2023    MCH 31.0 09/06/2023    MCHC 32.4 09/06/2023    RDW 12.2 (L) 09/06/2023    RDWSD 43.6 09/06/2023    MPV 12.1 (H) 09/06/2023     09/06/2023    NEUTRORELPCT 66.9 09/06/2023    LYMPHORELPCT 21.1 09/06/2023    MONORELPCT 7.0 09/06/2023    EOSRELPCT 3.3 09/06/2023    BASORELPCT 0.9 09/06/2023    AUTOIGPER 0.8 (H) 09/06/2023    NEUTROABS 6.98 09/06/2023    LYMPHSABS 2.20 09/06/2023    MONOSABS 0.73 09/06/2023    EOSABS 0.34 09/06/2023    BASOSABS 0.09 09/06/2023    AUTOIGNUM 0.08 (H) 09/06/2023    NRBC 0.0 08/31/2022     Drug-drug interactions  Completed medication reconciliation today to assess for drug interactions. Patient denies starting or stopping any medications.    Assessed medication list for interactions, no significant drug interactions noted.   Advised patient to call the clinic if any new medications are started so we can assess for drug-drug interactions.  Drug-food interactions discussed: eating grapefruit and drinking grapefruit juice  Vaccines are coordinated by the patient's oncologist and primary care provider.    Medications   Medicines reviewed by Ginette Hannah [facility] on 9/22/2023 at  8:01 AM  Prior to Admission medications    Medication Sig Start Date End Date Taking? Authorizing Provider   cetirizine (zyrTEC) 10 MG tablet Take 1 tablet by mouth.    Provider, MD Enma   dasatinib (Sprycel) 100 MG chemo tablet Take 1 tablet by mouth Daily. 9/20/23   Renan Mcbride MD   EPINEPHrine (EPIPEN) 0.3 MG/0.3ML solution auto-injector injection  1/27/20   ProviderEnma MD   ProAir  (90 Base) MCG/ACT inhaler INHALE 2 PUFFS EVERY 6 HOURS 2/25/22   ProviderEnma MD  "  buprenorphine-naloxone (SUBOXONE) 8-2 MG per SL tablet Place 1 tablet under the tongue Daily.  9/20/23  Provider, MD Enma   cyclobenzaprine (FLEXERIL) 5 MG tablet  8/17/20 9/20/23  Provider, Enma, MD   Sprycel 100 MG chemo tablet TAKE 1 TABLET BY MOUTH DAILY  Patient taking differently: Not taking at this time. 3/21/22 9/20/23  Renan Mcbride MD       Allergies  Known allergies and reactions were discussed with the patient. The patient's chart has been reviewed for allergy information and updated as necessary.   Allergies   Allergen Reactions    Bee Venom Anaphylaxis    Codeine Itching    Morphine Itching    Sulfa Antibiotics GI Intolerance    Sulfamethoxazole-Trimethoprim GI Intolerance    Ultram [Tramadol] Mental Status Change     \"blacked out\"         Allergies reviewed by Ginette Hannah [facility] on 9/22/2023 at  8:00 AM    Hospitalizations and Urgent Care Visits Since Last Assessment:  Unplanned hospitalizations or inpatient admissions: no  ED Visits: no  Urgent Office Visits: no    Adherence Assessment:  Adherence Questions  Medication(s) assessed: Sprycel  On average, how many doses/injections does the patient miss per month?: >10  What are the identified reasons for non-adherence or missed doses? : other  List other reason(s) for non-adherence or missed doses: Multiple MD visits have been canceled in the past.  MTM asked scheduling to call him for follow up on several occasions.  He came to the office this week on 9/20.  Dr Mcbride asked that he have Sprycel in his hand before leaving the [place] office, as he was not getting his refills timely at [facility].  He stated he wants to go to [place] to  his medication and not have it delivered to his home, as he lives 8 minutes away from the facility.  What is the estimated medication adherence level?: <70%  Based on the patient/caregiver response and refill history, does this patient require an MTP to track adherence " improvements?: yes (MTM has been following and actually got his follow up appointments scheduled)    Quality of Life Assessment:  Quality of Life Assessment  Quality of Life Improvement Scale: A good deal better  Comments on Quality of Life: No issues reported today  -- Quality of Life: 9/10    Financial Assessment:  Medication availability/affordability: Patient has had no issues obtaining medication from pharmacy, has had issues obtaining medication from pharmacy: [facility] .    Attestation:  I attest the patient was actively involved in and has agreed to the above plan of care. If the prescribed therapy is at any point deemed not appropriate based on the current or future assessments, a consultation will be initiated with the patient's specialty care provider to determine the best course of action. The revised plan of therapy will be documented along with any required assessments and/or additional patient education provided.       All questions addressed and patient had no additional concerns. Patient has pharmacy contact information.    Name/Credentials Matt Alfaro    Telemedicine encounter    9/22/2023  08:08 EDT

## 2023-10-06 ENCOUNTER — SPECIALTY PHARMACY (OUTPATIENT)
Dept: PHARMACY | Facility: HOSPITAL | Age: 33
End: 2023-10-06
Payer: COMMERCIAL

## 2023-10-06 NOTE — PROGRESS NOTES
"MTM telephone encounter re:adherence and side effects (Sprycel)     Chaitanya \"Nemesio\" reports starting Sprycel 100mg PO once daily. Thus far, patient denies side effects. . Thus far, no missed doses and no medication changes. Advised pt to call office if any changes. Patient expressed understanding and had no additional questions at this time.       Noemi Mendoza, Pharmacy Intern  10/6/2023  12:15 EDT   Ginette Hannah, [facility], BCOP  "

## 2023-10-16 ENCOUNTER — SPECIALTY PHARMACY (OUTPATIENT)
Dept: PHARMACY | Facility: HOSPITAL | Age: 33
End: 2023-10-16
Payer: COMMERCIAL

## 2023-10-16 NOTE — PROGRESS NOTES
"Specialty Pharmacy Refill Coordination Note     Chaitanya \"Nemesio\" is a 33 y.o. male contacted today regarding refills of  Sprycel specialty medication(s).    Reviewed and verified with patient:       Specialty medication(s) and dose(s) confirmed: yes    Refill Questions      Flowsheet Row Most Recent Value   Changes to allergies? No   Changes to medications? No   New conditions since last clinic visit No   Unplanned office visit, urgent care, ED, or hospital admission in the last 4 weeks  No   How does patient/caregiver feel medication is working? Good   Financial problems or insurance changes  No   Since the previous refill, were any specialty medication doses or scheduled injections missed or delayed?  No   Does this patient require a clinical escalation to a pharmacist? No            Delivery Questions      Flowsheet Row Most Recent Value   Delivery method  at Pharmacy  [The patient will p/u his sprycel from the pharmacy on 10/20.]   Delivery address correct? Yes   Delivery phone number [phone]   Number of medications in delivery 1   Medication(s) being filled and delivered Dasatinib   Doses left of specialty medications 7   Is there any medication that is due not being filled? No   Supplies needed? No supplies needed   Cooler needed? No   Do any medications need mixed or dated? No   Copay form of payment Pay at pickup   Additional comments N/A   Questions or concerns for the pharmacist? No   Explain any questions or concerns for the pharmacist N/A   Are any medications first time fills? No   Tracking number for delivery N/A              Medication Adherence          Adherence tools used: directed education      Support network for adherence: healthcare provider                Follow-up: 3 week(s)     Sarah Cuevas, Pharmacy Technician  Specialty Pharmacy Technician        "

## 2023-10-17 ENCOUNTER — SPECIALTY PHARMACY (OUTPATIENT)
Dept: PHARMACY | Facility: HOSPITAL | Age: 33
End: 2023-10-17
Payer: COMMERCIAL

## 2023-11-22 ENCOUNTER — SPECIALTY PHARMACY (OUTPATIENT)
Dept: PHARMACY | Facility: HOSPITAL | Age: 33
End: 2023-11-22
Payer: COMMERCIAL

## 2023-11-22 NOTE — PROGRESS NOTES
"Specialty Pharmacy Refill Coordination Note     Chaitanya \"Nemesio\" is a 33 y.o. male contacted today regarding refills of  Sprycel specialty medication(s).    Reviewed and verified with patient:       Specialty medication(s) and dose(s) confirmed: yes    Refill Questions      Flowsheet Row Most Recent Value   Changes to allergies? No   Changes to medications? No   New conditions since last clinic visit No   Unplanned office visit, urgent care, ED, or hospital admission in the last 4 weeks  No   How does patient/caregiver feel medication is working? Good   Financial problems or insurance changes  No   Since the previous refill, were any specialty medication doses or scheduled injections missed or delayed?  Yes   If yes, please provide the amount couple   Why were doses missed? forgot   Does this patient require a clinical escalation to a pharmacist? No            Delivery Questions      Flowsheet Row Most Recent Value   Delivery method  at Pharmacy  [pickup at pharmacy on 11/28]   Delivery address correct? Yes   Number of medications in delivery 1   Medication(s) being filled and delivered Dasatinib   Doses left of specialty medications 1 week   Is there any medication that is due not being filled? No   Supplies needed? No supplies needed   Cooler needed? No   Do any medications need mixed or dated? No   Copay form of payment Payment plan already set up   Questions or concerns for the pharmacist? No   Are any medications first time fills? No              Medication Adherence          Adherence tools used: directed education      Support network for adherence: healthcare provider                Follow-up: 3 week(s)     Scarlet Robles  Specialty Pharmacy Technician        "

## 2023-12-12 ENCOUNTER — LAB (OUTPATIENT)
Dept: LAB | Facility: HOSPITAL | Age: 33
End: 2023-12-12
Payer: COMMERCIAL

## 2023-12-12 DIAGNOSIS — C92.12 CHRONIC MYELOID LEUKEMIA, BCR/ABL-POSITIVE, IN RELAPSE: ICD-10-CM

## 2023-12-12 LAB
ALBUMIN SERPL-MCNC: 4.4 G/DL (ref 3.5–5.2)
ALBUMIN/GLOB SERPL: 2 G/DL
ALP SERPL-CCNC: 87 U/L (ref 39–117)
ALT SERPL W P-5'-P-CCNC: 23 U/L (ref 1–41)
ANION GAP SERPL CALCULATED.3IONS-SCNC: 11.3 MMOL/L (ref 5–15)
AST SERPL-CCNC: 22 U/L (ref 1–40)
BASOPHILS # BLD AUTO: 0.07 10*3/MM3 (ref 0–0.2)
BASOPHILS NFR BLD AUTO: 0.8 % (ref 0–1.5)
BILIRUB SERPL-MCNC: 0.3 MG/DL (ref 0–1.2)
BUN SERPL-MCNC: 13 MG/DL (ref 6–20)
BUN/CREAT SERPL: 13.1 (ref 7–25)
CALCIUM SPEC-SCNC: 8.9 MG/DL (ref 8.6–10.5)
CHLORIDE SERPL-SCNC: 106 MMOL/L (ref 98–107)
CO2 SERPL-SCNC: 23.7 MMOL/L (ref 22–29)
CREAT SERPL-MCNC: 0.99 MG/DL (ref 0.76–1.27)
DEPRECATED RDW RBC AUTO: 47.4 FL (ref 37–54)
EGFRCR SERPLBLD CKD-EPI 2021: 101.9 ML/MIN/1.73
EOSINOPHIL # BLD AUTO: 0.53 10*3/MM3 (ref 0–0.4)
EOSINOPHIL NFR BLD AUTO: 6.2 % (ref 0.3–6.2)
ERYTHROCYTE [DISTWIDTH] IN BLOOD BY AUTOMATED COUNT: 13.2 % (ref 12.3–15.4)
GLOBULIN UR ELPH-MCNC: 2.2 GM/DL
GLUCOSE SERPL-MCNC: 116 MG/DL (ref 65–99)
HCT VFR BLD AUTO: 44.7 % (ref 37.5–51)
HGB BLD-MCNC: 14.9 G/DL (ref 13–17.7)
IMM GRANULOCYTES # BLD AUTO: 0.06 10*3/MM3 (ref 0–0.05)
IMM GRANULOCYTES NFR BLD AUTO: 0.7 % (ref 0–0.5)
LYMPHOCYTES # BLD AUTO: 2.89 10*3/MM3 (ref 0.7–3.1)
LYMPHOCYTES NFR BLD AUTO: 33.7 % (ref 19.6–45.3)
MCH RBC QN AUTO: 32.6 PG (ref 26.6–33)
MCHC RBC AUTO-ENTMCNC: 33.3 G/DL (ref 31.5–35.7)
MCV RBC AUTO: 97.8 FL (ref 79–97)
MONOCYTES # BLD AUTO: 0.72 10*3/MM3 (ref 0.1–0.9)
MONOCYTES NFR BLD AUTO: 8.4 % (ref 5–12)
NEUTROPHILS NFR BLD AUTO: 4.31 10*3/MM3 (ref 1.7–7)
NEUTROPHILS NFR BLD AUTO: 50.2 % (ref 42.7–76)
NRBC BLD AUTO-RTO: 0 /100 WBC (ref 0–0.2)
PLATELET # BLD AUTO: 184 10*3/MM3 (ref 140–450)
PMV BLD AUTO: 11.9 FL (ref 6–12)
POTASSIUM SERPL-SCNC: 4 MMOL/L (ref 3.5–5.2)
PROT SERPL-MCNC: 6.6 G/DL (ref 6–8.5)
RBC # BLD AUTO: 4.57 10*6/MM3 (ref 4.14–5.8)
SODIUM SERPL-SCNC: 141 MMOL/L (ref 136–145)
WBC NRBC COR # BLD AUTO: 8.58 10*3/MM3 (ref 3.4–10.8)

## 2023-12-12 PROCEDURE — 80053 COMPREHEN METABOLIC PANEL: CPT | Performed by: INTERNAL MEDICINE

## 2023-12-12 PROCEDURE — 36415 COLL VENOUS BLD VENIPUNCTURE: CPT

## 2023-12-12 PROCEDURE — 85025 COMPLETE CBC W/AUTO DIFF WBC: CPT | Performed by: INTERNAL MEDICINE
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
## 2023-12-22 LAB — REF LAB TEST METHOD: NORMAL

## 2023-12-26 ENCOUNTER — LAB (OUTPATIENT)
Dept: LAB | Facility: HOSPITAL | Age: 33
End: 2023-12-26
Payer: COMMERCIAL

## 2023-12-26 ENCOUNTER — APPOINTMENT (OUTPATIENT)
Dept: ONCOLOGY | Facility: HOSPITAL | Age: 33
End: 2023-12-26
Payer: COMMERCIAL

## 2023-12-26 ENCOUNTER — OFFICE VISIT (OUTPATIENT)
Dept: ONCOLOGY | Facility: CLINIC | Age: 33
End: 2023-12-26
Payer: COMMERCIAL

## 2023-12-26 VITALS
TEMPERATURE: 98.7 F | HEIGHT: 72 IN | OXYGEN SATURATION: 98 % | BODY MASS INDEX: 42.66 KG/M2 | SYSTOLIC BLOOD PRESSURE: 134 MMHG | RESPIRATION RATE: 16 BRPM | DIASTOLIC BLOOD PRESSURE: 80 MMHG | WEIGHT: 315 LBS | HEART RATE: 78 BPM

## 2023-12-26 DIAGNOSIS — C92.12 CHRONIC MYELOID LEUKEMIA, BCR/ABL-POSITIVE, IN RELAPSE: Primary | ICD-10-CM

## 2023-12-26 PROCEDURE — 99214 OFFICE O/P EST MOD 30 MIN: CPT | Performed by: INTERNAL MEDICINE

## 2023-12-26 PROCEDURE — 1126F AMNT PAIN NOTED NONE PRSNT: CPT | Performed by: INTERNAL MEDICINE

## 2023-12-26 RX ORDER — CITALOPRAM 20 MG/1
1 TABLET ORAL
COMMUNITY
Start: 2023-12-12 | End: 2024-03-10

## 2023-12-28 ENCOUNTER — SPECIALTY PHARMACY (OUTPATIENT)
Dept: ONCOLOGY | Facility: HOSPITAL | Age: 33
End: 2023-12-28

## 2024-01-11 ENCOUNTER — SPECIALTY PHARMACY (OUTPATIENT)
Dept: PHARMACY | Facility: HOSPITAL | Age: 34
End: 2024-01-11
Payer: COMMERCIAL

## 2024-01-11 NOTE — PROGRESS NOTES
"Specialty Pharmacy Refill Coordination Note     Chaitanya \"Nemesio\" is a 33 y.o. male contacted today regarding refills of  Sprycel specialty medication(s).    Reviewed and verified with patient:       Specialty medication(s) and dose(s) confirmed: yes    Refill Questions      Flowsheet Row Most Recent Value   Changes to allergies? No   Changes to medications? No   New conditions or infections since last clinic visit No   Unplanned office visit, urgent care, ED, or hospital admission in the last 4 weeks  No   How does patient/caregiver feel medication is working? Good   Financial problems or insurance changes  No   Since the previous refill, were any specialty medication doses or scheduled injections missed or delayed?  No   If yes, please provide the amount N/A   Why were doses missed? N/A   Does this patient require a clinical escalation to a pharmacist? No            Delivery Questions      Flowsheet Row Most Recent Value   Delivery method  at Pharmacy   Delivery address verified with patient/caregiver? Yes   Delivery address Other (Comment)  [1/11/24: Patient will p/u from the pharmacy \"next week\". He is aware that it will be returned to stock in 10 days. $0 co-pay.]   Number of medications in delivery 1   Medication(s) being filled and delivered Dasatinib   Doses left of specialty medications 7   Copay verified? Yes   Copay amount $0   Copay form of payment No copayment ($0)              Medication Adherence          Adherence tools used: directed education      Support network for adherence: healthcare provider                Follow-up: 3 week(s)     Sarah Cuevas, Pharmacy Technician  Specialty Pharmacy Technician        "

## 2024-02-20 ENCOUNTER — SPECIALTY PHARMACY (OUTPATIENT)
Dept: PHARMACY | Facility: HOSPITAL | Age: 34
End: 2024-02-20
Payer: COMMERCIAL

## 2024-02-20 NOTE — PROGRESS NOTES
"Specialty Pharmacy Refill Coordination Note     Chaitanya \"Nemesio\" is a 33 y.o. male contacted today regarding refills of  Sprycel specialty medication(s).    Reviewed and verified with patient:       Specialty medication(s) and dose(s) confirmed: yes    Refill Questions      Flowsheet Row Most Recent Value   Changes to allergies? No   Changes to medications? No   New conditions or infections since last clinic visit No   Unplanned office visit, urgent care, ED, or hospital admission in the last 4 weeks  No   How does patient/caregiver feel medication is working? Good   Financial problems or insurance changes  No   Since the previous refill, were any specialty medication doses or scheduled injections missed or delayed?  No   If yes, please provide the amount N/A   Why were doses missed? N/A   Does this patient require a clinical escalation to a pharmacist? No            Delivery Questions      Flowsheet Row Most Recent Value   Delivery method  at Pharmacy   Delivery address verified with patient/caregiver? Yes   Delivery address Other (Comment)  [patient will p/u from [facility] on 2/21/24. $0 co-pay.]   Number of medications in delivery 1   Medication(s) being filled and delivered Dasatinib   Doses left of specialty medications 3   Copay verified? Yes   Copay amount $0   Copay form of payment No copayment ($0)              Medication Adherence          Adherence tools used: directed education      Support network for adherence: healthcare provider                Follow-up: 3 week(s)     Sarah Cuevas, Pharmacy Technician  Specialty Pharmacy Technician        "

## 2024-03-13 ENCOUNTER — SPECIALTY PHARMACY (OUTPATIENT)
Dept: PHARMACY | Facility: HOSPITAL | Age: 34
End: 2024-03-13
Payer: COMMERCIAL

## 2024-03-13 NOTE — PROGRESS NOTES
Called and left a voicemail to receive a call back to complete 6 month check-in.    Paola Louie, Pharmacy Intern

## 2024-03-15 NOTE — PROGRESS NOTES
Specialty Note- Dasatinib    Call placed for 6 month adherence and toxicity check.  His wife answered- patient was in the shower and will call us back Monday.

## 2024-03-18 ENCOUNTER — SPECIALTY PHARMACY (OUTPATIENT)
Dept: PHARMACY | Facility: HOSPITAL | Age: 34
End: 2024-03-18
Payer: COMMERCIAL

## 2024-06-04 ENCOUNTER — PRIOR AUTHORIZATION (OUTPATIENT)
Dept: ONCOLOGY | Facility: CLINIC | Age: 34
End: 2024-06-04
Payer: COMMERCIAL

## 2024-06-04 NOTE — TELEPHONE ENCOUNTER
No PA required for BCR/ABL by PCR [id number] per [employer] code . No ref #. Ok'd lab to send to [facility] on 6/11/24.

## 2024-06-21 ENCOUNTER — TELEPHONE (OUTPATIENT)
Dept: ONCOLOGY | Facility: CLINIC | Age: 34
End: 2024-06-21
Payer: COMMERCIAL

## 2024-06-21 NOTE — TELEPHONE ENCOUNTER
----- Message from Marshall JENSEN sent at 6/20/2024  3:17 PM EDT -----  Regarding: Reschedule  See below. Please reschedule.  ----- Message -----  From: Paula Haas  Sent: 6/20/2024   3:15 PM EDT  To: Marshall Astudillo RN  Subject: see below                                        He was a no show for his bcr/abl and is due in on 6/25 takes about 7 days

## 2024-07-03 ENCOUNTER — SPECIALTY PHARMACY (OUTPATIENT)
Dept: PHARMACY | Facility: HOSPITAL | Age: 34
End: 2024-07-03
Payer: COMMERCIAL

## 2024-07-23 ENCOUNTER — SPECIALTY PHARMACY (OUTPATIENT)
Dept: PHARMACY | Facility: HOSPITAL | Age: 34
End: 2024-07-23
Payer: COMMERCIAL

## 2024-07-23 NOTE — PROGRESS NOTES
Enrollment Paused until patient resumes care with the clinic.    Sarah Cuevas - Care Coordinator   7/23/2024  10:13 EDT

## 2024-10-02 NOTE — PROGRESS NOTES
MTM telephone encounter re adherence and side effects ( Sprycel)    Mr Parker's reported adherence to Sprycel 100 mg po daily continues to be appropriate with no missing doses recently.  He denies any side effects at this time, reports he is eating well and remains active.  He has not added any new medications recently, and no questions or concerns for the MTM office.       Detail Level: Zone

## (undated) DEVICE — DRAPE,REIN 53X77,STERILE: Brand: MEDLINE

## (undated) DEVICE — 3M™ IOBAN™ 2 ANTIMICROBIAL INCISE DRAPE 6650EZ: Brand: IOBAN™ 2

## (undated) DEVICE — PREP SOL POVIDONE/IODINE BT 4OZ

## (undated) DEVICE — APPL CHLORAPREP W/TINT 26ML ORNG

## (undated) DEVICE — BNDG ESMARK 6INX9FT STRL

## (undated) DEVICE — SOL ISO/ALC RUB 70PCT 4OZ

## (undated) DEVICE — DRP C/ARM 41X74IN

## (undated) DEVICE — SOL ANTISEP SCRB PVPI 7.5PCT 4OZ

## (undated) DEVICE — PAD UNDERCAST WYTEX 4IN 4YD LF STRL

## (undated) DEVICE — Device

## (undated) DEVICE — TOWEL,OR,DSP,ST,BLUE,STD,4/PK,20PK/CS: Brand: MEDLINE

## (undated) DEVICE — TRY SKINPREP WET CHG 4PCT SPNG HIB

## (undated) DEVICE — IMMOB KN 3PNL DLX CANVS 24IN BLU

## (undated) DEVICE — GLV SURG SENSICARE SLT PF LF 7.5 STRL

## (undated) DEVICE — SUT PDS 0 CT1 36IN Z346H

## (undated) DEVICE — GLV SURG NEOLON 2G PF LF 7.5 STRL

## (undated) DEVICE — GLV SURG SENSICARE W/ALOE PF LF 7.5 STRL

## (undated) DEVICE — DISPOSABLE TOURNIQUET CUFF SINGLE BLADDER, SINGLE PORT AND LUER LOCK CONNECTOR: Brand: COLOR CUFF

## (undated) DEVICE — SUT VIC 0 0S6 CR3 18IN J754T

## (undated) DEVICE — IRRIGATOR BULB 60CC

## (undated) DEVICE — TRY SKINPREP DRYPREP

## (undated) DEVICE — HANDPIECE SET WITH COAXIAL HIGH FLOW TIP AND SUCTION TUBE: Brand: INTERPULSE

## (undated) DEVICE — SUT VIC 0 CT1 CR8 18IN J840D

## (undated) DEVICE — GLV SURG SENSICARE ORTHO PF LF 7 STRL

## (undated) DEVICE — PK BASIC ORTHO 90

## (undated) DEVICE — SUT NLY 2/0 664G

## (undated) DEVICE — WEBRIL* CAST PADDING: Brand: DEROYAL

## (undated) DEVICE — SPNG GZ WOVN 4X4IN 12PLY 10/BX STRL

## (undated) DEVICE — DRSNG PAD ABD 8X10IN STRL

## (undated) DEVICE — STPLR SKIN VISISTAT WD 35CT

## (undated) DEVICE — CYSTO/BLADDER IRRIGATION SET, REGULATING CLAMP

## (undated) DEVICE — CULT AER/ANAEROB FASTIDIOUS BACT

## (undated) DEVICE — STCKNT IMPERV 14IN STRL

## (undated) DEVICE — SPLNT ORTHO 1STEP 5X45IN

## (undated) DEVICE — BNDG ELAS ELITE V/CLOSE 6IN 5YD LF STRL

## (undated) DEVICE — PETROLATUM DRESSING. FINE MESH GAUZE IMPREGNATED WITH 3% BISMUTH TRIBROMOPHENATE  IN A PETROLATUM BLEND.: Brand: XEROFORM PETROLATUM

## (undated) DEVICE — RL COTN ABSORB/COMPR 1/2LB 6IN 13FT 1PU

## (undated) DEVICE — FRAZIER SUCTION INSTRUMENT 10 FR W/CONTROL VENT & OBTURATOR: Brand: FRAZIER

## (undated) DEVICE — BOWL UTIL STRL 32OZ

## (undated) DEVICE — BNDG ELAS MATRX V/CLS 4IN 5YD LF

## (undated) DEVICE — STCKNT IMPERV 12IN STRL

## (undated) DEVICE — SUT MNCRYL 3/0 PS2 27IN Y427H

## (undated) DEVICE — 3M™ STERI-DRAPE™ U-DRAPE 1015: Brand: STERI-DRAPE™

## (undated) DEVICE — SPLNT 1 STEP 3X35IN

## (undated) DEVICE — HEWSON SUTURE RETRIEVER: Brand: HEWSON SUTURE RETRIEVER

## (undated) DEVICE — TRANSPOSAL ULTRAFLEX DUO/QUAD ULTRA CART MANIFOLD

## (undated) DEVICE — SUT VIC 2/0 CP2 CR8 18IN J762D

## (undated) DEVICE — DRSNG GZ CURAD XEROFORM NONADHS 5X9IN STRL

## (undated) DEVICE — BOWL PLSTC LG 32OZ BLU STRL

## (undated) DEVICE — SOL PVPI ANTISEPT SCRB 4OZ

## (undated) DEVICE — PROXIMATE RH ROTATING HEAD SKIN STAPLERS (35 WIDE) CONTAINS 35 STAINLESS STEEL STAPLES: Brand: PROXIMATE

## (undated) DEVICE — SOL BETADINE 4OZ